# Patient Record
Sex: MALE | Race: WHITE | Employment: OTHER | URBAN - METROPOLITAN AREA
[De-identification: names, ages, dates, MRNs, and addresses within clinical notes are randomized per-mention and may not be internally consistent; named-entity substitution may affect disease eponyms.]

---

## 2017-01-01 ENCOUNTER — OFFICE VISIT (OUTPATIENT)
Dept: CARDIOLOGY | Facility: CLINIC | Age: 77
End: 2017-01-01
Attending: INTERNAL MEDICINE
Payer: MEDICARE

## 2017-01-01 ENCOUNTER — TRANSFERRED RECORDS (OUTPATIENT)
Dept: HEALTH INFORMATION MANAGEMENT | Facility: CLINIC | Age: 77
End: 2017-01-01

## 2017-01-01 VITALS
SYSTOLIC BLOOD PRESSURE: 126 MMHG | HEIGHT: 66 IN | HEART RATE: 45 BPM | DIASTOLIC BLOOD PRESSURE: 60 MMHG | WEIGHT: 142.5 LBS | OXYGEN SATURATION: 96 % | BODY MASS INDEX: 22.9 KG/M2

## 2017-01-01 DIAGNOSIS — I25.10 CORONARY ARTERY DISEASE INVOLVING NATIVE CORONARY ARTERY OF NATIVE HEART WITHOUT ANGINA PECTORIS: ICD-10-CM

## 2017-01-01 DIAGNOSIS — N18.6 ESRD (END STAGE RENAL DISEASE) ON DIALYSIS (H): Primary | ICD-10-CM

## 2017-01-01 DIAGNOSIS — I10 ESSENTIAL HYPERTENSION WITH GOAL BLOOD PRESSURE LESS THAN 140/90: ICD-10-CM

## 2017-01-01 DIAGNOSIS — E78.5 HYPERLIPIDEMIA LDL GOAL <100: ICD-10-CM

## 2017-01-01 DIAGNOSIS — Z99.2 ESRD (END STAGE RENAL DISEASE) ON DIALYSIS (H): Primary | ICD-10-CM

## 2017-01-01 DIAGNOSIS — R60.9 EDEMA, UNSPECIFIED TYPE: ICD-10-CM

## 2017-01-01 PROCEDURE — 99214 OFFICE O/P EST MOD 30 MIN: CPT | Performed by: INTERNAL MEDICINE

## 2017-01-01 RX ORDER — METOPROLOL SUCCINATE 50 MG/1
50 TABLET, EXTENDED RELEASE ORAL DAILY
Qty: 30 TABLET | Refills: 11 | Status: ON HOLD | OUTPATIENT
Start: 2017-01-01 | End: 2018-01-01

## 2017-01-01 RX ORDER — FUROSEMIDE 40 MG
40 TABLET ORAL DAILY
Qty: 90 TABLET | Refills: 3 | Status: SHIPPED | OUTPATIENT
Start: 2017-01-01 | End: 2018-01-01

## 2017-01-01 RX ORDER — SIMVASTATIN 40 MG
TABLET ORAL
Qty: 90 TABLET | Refills: 1 | Status: ON HOLD | OUTPATIENT
Start: 2017-01-01 | End: 2018-01-01

## 2017-01-09 ENCOUNTER — TRANSFERRED RECORDS (OUTPATIENT)
Dept: HEALTH INFORMATION MANAGEMENT | Facility: CLINIC | Age: 77
End: 2017-01-09

## 2017-01-09 DIAGNOSIS — I12.9 BENIGN HYPERTENSION WITH CHRONIC KIDNEY DISEASE, STAGE IV (H): Primary | ICD-10-CM

## 2017-01-09 DIAGNOSIS — N18.4 BENIGN HYPERTENSION WITH CHRONIC KIDNEY DISEASE, STAGE IV (H): ICD-10-CM

## 2017-01-09 DIAGNOSIS — M10.9 ACUTE GOUTY ARTHROPATHY: Primary | ICD-10-CM

## 2017-01-09 DIAGNOSIS — N18.4 CKD (CHRONIC KIDNEY DISEASE) STAGE 4, GFR 15-29 ML/MIN (H): Primary | ICD-10-CM

## 2017-01-09 DIAGNOSIS — N18.4 BENIGN HYPERTENSION WITH CHRONIC KIDNEY DISEASE, STAGE IV (H): Primary | ICD-10-CM

## 2017-01-09 DIAGNOSIS — I12.9 BENIGN HYPERTENSION WITH CHRONIC KIDNEY DISEASE, STAGE IV (H): ICD-10-CM

## 2017-01-09 NOTE — TELEPHONE ENCOUNTER
amLODIPine (NORVASC) 5 MG      Last Written Prescription Date: 12/13/16  Last Fill Quantity: 30, # refills: 0  Last Office Visit with [facility] or [facility] prescribing provider: 8/18/16   Next 5 appointments (look out 90 days)     Jan 19, 2017  3:15 PM   Return Visit with Tae Hendrix MD   [facility] ([facility]    [address]  [facility] [address] [phone]                   POTASSIUM   Date Value Ref Range Status   11/16/2016 4.3 3.4 - 5.3 mmol/L Final     CREATININE   Date Value Ref Range Status   11/16/2016 3.95* 0.66 - 1.25 mg/dL Final     BP Readings from Last 3 Encounters:   11/16/16 142/70   08/18/16 136/44   08/15/16 142/52

## 2017-01-09 NOTE — TELEPHONE ENCOUNTER
hydrALAZINE (APRESOLINE) 100 MG TABS      Last Written Prescription Date:  historical  Last Fill Quantity: 90,   # refills: na  Last Office Visit with [facility] or [facility] prescribing provider: 8/18/16  Future Office visit:    Next 5 appointments (look out 90 days)     Jan 19, 2017  3:15 PM   Return Visit with Tae Hendrix MD   [facility]    [address]  [facility] [id number] [phone]                   Routing refill request to provider for review/approval because:  Medication is reported/historical

## 2017-01-10 RX ORDER — ALLOPURINOL 100 MG/1
100 TABLET ORAL DAILY
Qty: 60 TABLET | Refills: 0 | Status: SHIPPED | OUTPATIENT
Start: 2017-01-10 | End: 2017-01-18

## 2017-01-10 RX ORDER — HYDRALAZINE HYDROCHLORIDE 100 MG/1
100 TABLET, FILM COATED ORAL 3 TIMES DAILY
Qty: 60 TABLET | Refills: 0 | Status: SHIPPED | OUTPATIENT
Start: 2017-01-10 | End: 2017-01-18

## 2017-01-10 RX ORDER — AMLODIPINE BESYLATE 5 MG/1
5 TABLET ORAL DAILY
Qty: 30 TABLET | Refills: 0 | Status: SHIPPED | OUTPATIENT
Start: 2017-01-10 | End: 2017-01-16

## 2017-01-10 NOTE — TELEPHONE ENCOUNTER
1st attempt called and left message for pt to call us and schedule appt. There were 2 encounters to call patient I closed the one and used just this encounter

## 2017-01-10 NOTE — TELEPHONE ENCOUNTER
Routing refill request to provider for review/approval because:  Labs out of range:  Cr, uric acid not on file. Please sign if ok.   Lab teed up if you would like.   Kassandra Machado, RN  Triage Nurse

## 2017-01-10 NOTE — TELEPHONE ENCOUNTER
Will address at up coming appt;   meds have been adjusted by cardiology but not clear if meds being filled by cardiology;

## 2017-01-10 NOTE — TELEPHONE ENCOUNTER
meds adjusted by cardiology. Due for evaluation in clinic to reassess this medication; cardiology or PCP.

## 2017-01-10 NOTE — TELEPHONE ENCOUNTER
allopurinol (ZYLOPRIM) 100 MG       Last Written Prescription Date: 7/25/16  Last Fill Quantity: 60, # refills: 3  Last Office Visit with [facility] or [facility] prescribing provider:  8/18/16   Next 5 appointments (look out 90 days)     Jan 19, 2017  3:15 PM   Return Visit with Tae Hendrix MD   [facility]    [address]  [facility] [address] [phone]                   No results found for: URIC]  CREATININE   Date Value Ref Range Status   11/16/2016 3.95* 0.66 - 1.25 mg/dL Final   ]  WBC     10.2   11/16/2016  RBC     2.04   11/16/2016  HGB      6.7   11/16/2016  HCT     20.7   11/16/2016  No components found with this name: mct  MCV      102   11/16/2016  MCH     32.8   11/16/2016  MCHC     32.4   11/16/2016  RDW     14.7   11/16/2016  PLT      218   11/16/2016  PLT      312   8/14/2014  AST       12   8/30/2016  ALT       15   8/30/2016

## 2017-01-10 NOTE — TELEPHONE ENCOUNTER
Routing refill request to provider for review/approval because:  Labs out of range:  Cr. (CKD) BP above desired range. Please sign if ok.  Kassandra Machado, ALEXANDER  Triage Nurse

## 2017-01-10 NOTE — TELEPHONE ENCOUNTER
3 SEPARATE REQUESTS put in on the same patient, on the same day.   Same message, due for appt; and labs.  Interim fill of meds and assist with scheduling

## 2017-01-16 DIAGNOSIS — N18.4 CKD (CHRONIC KIDNEY DISEASE) STAGE 4, GFR 15-29 ML/MIN (H): Primary | ICD-10-CM

## 2017-01-16 DIAGNOSIS — I12.9 BENIGN HYPERTENSION WITH CHRONIC KIDNEY DISEASE, STAGE IV (H): ICD-10-CM

## 2017-01-16 DIAGNOSIS — N18.4 BENIGN HYPERTENSION WITH CHRONIC KIDNEY DISEASE, STAGE IV (H): ICD-10-CM

## 2017-01-16 RX ORDER — AMLODIPINE BESYLATE 10 MG/1
10 TABLET ORAL DAILY
Qty: 90 TABLET | Refills: 3 | Status: SHIPPED | OUTPATIENT
Start: 2017-01-16 | End: 2018-01-01

## 2017-01-18 ENCOUNTER — OFFICE VISIT (OUTPATIENT)
Dept: FAMILY MEDICINE | Facility: CLINIC | Age: 77
End: 2017-01-18
Payer: MEDICARE

## 2017-01-18 VITALS
TEMPERATURE: 98.1 F | DIASTOLIC BLOOD PRESSURE: 58 MMHG | OXYGEN SATURATION: 100 % | BODY MASS INDEX: 21.89 KG/M2 | WEIGHT: 136.2 LBS | RESPIRATION RATE: 16 BRPM | HEIGHT: 66 IN | HEART RATE: 70 BPM | SYSTOLIC BLOOD PRESSURE: 124 MMHG

## 2017-01-18 DIAGNOSIS — M10.9 ACUTE GOUTY ARTHROPATHY: ICD-10-CM

## 2017-01-18 DIAGNOSIS — N18.4 BENIGN HYPERTENSION WITH CHRONIC KIDNEY DISEASE, STAGE IV (H): Primary | ICD-10-CM

## 2017-01-18 DIAGNOSIS — I12.9 BENIGN HYPERTENSION WITH CHRONIC KIDNEY DISEASE, STAGE IV (H): Primary | ICD-10-CM

## 2017-01-18 DIAGNOSIS — E78.5 HYPERLIPIDEMIA LDL GOAL <100: ICD-10-CM

## 2017-01-18 DIAGNOSIS — E11.59 TYPE 2 DIABETES MELLITUS WITH OTHER CIRCULATORY COMPLICATION: ICD-10-CM

## 2017-01-18 DIAGNOSIS — T82.510D: ICD-10-CM

## 2017-01-18 DIAGNOSIS — N18.4 CKD (CHRONIC KIDNEY DISEASE) STAGE 4, GFR 15-29 ML/MIN (H): ICD-10-CM

## 2017-01-18 PROCEDURE — 99214 OFFICE O/P EST MOD 30 MIN: CPT | Performed by: INTERNAL MEDICINE

## 2017-01-18 PROCEDURE — 99207 C FOOT EXAM  NO CHARGE: CPT | Performed by: INTERNAL MEDICINE

## 2017-01-18 RX ORDER — ALLOPURINOL 100 MG/1
100 TABLET ORAL DAILY
Qty: 90 TABLET | Refills: 3 | Status: SHIPPED | OUTPATIENT
Start: 2017-01-18 | End: 2018-01-01

## 2017-01-18 RX ORDER — SIMVASTATIN 40 MG
40 TABLET ORAL AT BEDTIME
Qty: 90 TABLET | Refills: 1 | Status: SHIPPED | OUTPATIENT
Start: 2017-01-18 | End: 2017-05-18

## 2017-01-18 RX ORDER — HYDRALAZINE HYDROCHLORIDE 100 MG/1
100 TABLET, FILM COATED ORAL 3 TIMES DAILY
Qty: 270 TABLET | Refills: 1 | Status: SHIPPED | OUTPATIENT
Start: 2017-01-18 | End: 2017-05-18

## 2017-01-18 NOTE — MR AVS SNAPSHOT
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        Follow-ups after your visit        Your next 10 appointments already scheduled     Jan 19, 2017  1:00 PM   US CAROTID BILATERAL with [address] ([facility])    [address] [address] [address]   [phone]           Please bring a list of your medicines (including vitamins, minerals and over-the-counter drugs). Also, tell your doctor about any allergies you may have. Wear comfortable clothes and leave your valuables at home.  You do not need to do anything special to prepare for your exam.  Please call the Imaging Department at your exam site with any questions.            Jan 19, 2017  1:45 PM   US EXTREMITY ARTERIAL VENOUS DIALYSIS ACCESS GRAFT with [address] ([facility])    [address] [facility] [address]  [facility] [address]   [phone]           Please bring a list of your medicines (including vitamins, minerals and over-the-counter drugs). Also, tell your doctor about any allergies you may have. Wear comfortable clothes and leave your valuables at home.  You do not need to do anything special to  prepare for your exam.  Please call the Imaging Department at your exam site with any questions.            Jan 19, 2017  3:15 PM   Return Visit with Tae Hendrix MD   [facility] ([facility])    [address]  [facility] [id number] [phone]              Future tests that were ordered for you today     Open Future Orders        Priority Expected Expires Ordered    Hemoglobin A1c Routine 5/18/2017 1/18/2018 1/18/2017    Comprehensive metabolic panel Routine 5/18/2017 1/18/2018 1/18/2017    Lipid panel reflex to direct LDL Routine 5/18/2017 1/18/2018 1/18/2017            Who to contact     If you have questions or need follow up information about today's clinic visit or your schedule please contact [facility] directly at [phone].  Normal or non-critical lab and imaging results will be communicated to you by MyChart, letter or phone within 4 business days after the clinic has received the results. If you do not hear from us within 7 days, please contact the clinic through [facility]Sp[facility]art or phone. If you have a critical or abnormal lab result, we will notify you by phone as soon as possible.  Submit refill requests through TriggerMail or call your pharmacy and they will forward the refill request to us. Please allow 3 business days for your refill to be completed.          Additional Information About Your Visit        MyChart Information     TriggerMail gives you secure access to your electronic health record. If you see a primary care provider, you can also send messages to your care team and make appointments. If you have questions, please call your primary care clinic.  If you do not have a primary care provider, please call [phone] and they will assist you.        Care EveryWhere ID     This is your Care EveryWhere ID. This could be used by other organizations to access your Distant medical records  [id number]        Your  "Vitals Were     Pulse Temperature Respirations Height BMI (Body Mass Index) Pulse Oximetry    70 98.1  F (36.7  C) (Oral) 16 5' 5.5\" (1.664 m) 22.31 kg/m2 100%       Blood Pressure from Last 3 Encounters:   01/18/17 124/58   11/16/16 142/70   08/18/16 136/44    Weight from Last 3 Encounters:   01/18/17 136 lb 3.2 oz (61.78 kg)   11/16/16 128 lb 1.4 oz (58.1 kg)   08/18/16 131 lb 3.2 oz (59.512 kg)              We Performed the Following     FOOT EXAM          Today's Medication Changes          These changes are accurate as of: 1/18/17 11:17 AM.  If you have any questions, ask your nurse or doctor.               These medicines have changed or have updated prescriptions.        Dose/Directions    allopurinol 100 MG tablet   Commonly known as:  ZYLOPRIM   This may have changed:  additional instructions   Used for:  Acute gouty arthropathy   Changed by:  Shaila Silverman MD        Dose:  100 mg   Take 1 tablet (100 mg) by mouth daily   Quantity:  90 tablet   Refills:  3         Stop taking these medicines if you haven't already. Please contact your care team if you have questions.     sitagliptin 25 MG tablet   Commonly known as:  JANUVIA   Stopped by:  Shaila Silverman MD                Where to get your medicines      These medications were sent to [id number] - [facility] [address] [facility] [address] [place] [address] & [facility]  [address] [facility] [address]     Phone:  [phone]    - allopurinol 100 MG tablet  - hydrALAZINE 100 MG Tabs tablet  - simvastatin 40 MG tablet             Primary Care Provider Office Phone # Fax #    Shaila Silverman [phone]       [facility] [address]  [facility] [address]        Thank you!     Thank you for choosing [facility]  for your care. Our goal is always to provide you with excellent care. Hearing back from our patients is one way we can continue to improve our services. " Please take a few minutes to complete the written survey that you may receive in the mail after your visit with us. Thank you!             Your Updated Medication List - Protect others around you: Learn how to safely use, store and throw away your medicines at www.disposemymeds.org.          This list is accurate as of: 1/18/17 11:17 AM.  Always use your most recent med list.                   Brand Name Dispense Instructions for use    allopurinol 100 MG tablet    ZYLOPRIM    90 tablet    Take 1 tablet (100 mg) by mouth daily       amLODIPine 10 MG tablet    NORVASC    90 tablet    Take 1 tablet (10 mg) by mouth daily       aspirin 81 MG EC tablet      Take 81 mg by mouth daily       docusate sodium 100 MG capsule    COLACE     Take 100 mg by mouth 2 times daily       hydrALAZINE 100 MG Tabs tablet    APRESOLINE    270 tablet    Take 1 tablet (100 mg) by mouth 3 times daily       isosorbide mononitrate 30 MG 24 hr tablet    IMDUR    90 tablet    Take 1 tablet (30 mg) by mouth daily       MEGACE ORAL PO      Take 40 mg by mouth 2 times daily       METOPROLOL SUCCINATE ER PO      Take 75 mg by mouth daily 50 mg x 1.5 tabs       omeprazole 20 MG CR capsule    priLOSEC    30 capsule    Take 1 capsule (20 mg) by mouth daily       simvastatin 40 MG tablet    ZOCOR    90 tablet    Take 1 tablet (40 mg) by mouth At Bedtime       VITAMIN D3 PO      Take 1,000 Units by mouth daily

## 2017-01-18 NOTE — PROGRESS NOTES
SUBJECTIVE:                                                    Bean Croft is a 76 year old male who presents to clinic today for the following health issues:       Hypertension Follow-up    Outpatient blood pressures are not being checked.    Low Salt Diet: no added salt    BP Readings from Last 3 Encounters:   01/18/17 124/58   11/16/16 142/70   08/18/16 136/44          Amount of exercise or physical activity: None    Problems taking medications regularly: No    Medication side effects: none    Diet: regular (no restrictions)      HISTORY OF PRESENT ILLNESS:    Diabetes:  The patient reports having started Dialysis treatment in November at [facility]. He now goes twice per week and has felt noticeably better since starting Dialysis, noting he no longer needs to check his blood sugars anymore because his diabetes is well-controlled. The only concerns he has at this time is with his appetite, which he claims has decreased. He has been forcing himself to drink boost every day to receive good nutrition and protein.     Hypertension:  The patient is happy about his blood pressure reading in the clinic today, because he states his readings have been climbing steadily. After seeing his blood pressure of 124/52 today, he believes the addition of Amlodipine has been helpful in stabilizing his blood pressure.       Problem list and histories reviewed & adjusted, as indicated.  Additional history: as documented    Labs reviewed in EPIC  Problem list, Medication list, Allergies, and Medical/Social/Surgical histories reviewed in [facility] and updated as appropriate.      REVIEW OF SYSTEMS:  C: NEGATIVE for fever, chills, or change in weight  R: NEGATIVE for significant cough or SOB  CV: Hx of Left Carotid Stenosis, Peripheral Vascular Disease, Myocardial Infarction, Coronary Artery Disease, Pseudoaneurysm, and Hypertension - use of Amlodipine, Hydralazine, and Metoprolol; NEGATIVE for chest pain, palpitations or  "peripheral edema  GI: Hx of Gastroesophageal Reflux - use of Omeprazole; NEGATIVE for nausea, abdominal pain, or change in bowel habits  : Hx of Chronic Kidney Disease - stage 4 - Dialysis Treatment twice per week since November; NEGATIVE for unusual urinary symptoms  M: NEGATIVE for significant arthralgias or myalgia  N: NEGATIVE for weakness, dizziness or paresthesias  E: Hx of Hyperlipidemia - use of Simvastatin; Hx of Diabetes Mellitus - use of Januvia and Dialysis Treatment; NEGATIVE for temperature intolerance, or skin/hair changes  H: Hx of Anemia in Chronic Kidney Disease; NEGATIVE for bleeding problems  P: NEGATIVE for changes in mood or affect    This document serves as a record of the services and decisions personally performed and made by Shaila Silverman MD. It was created on her behalf by Bety Dugan, a trained medical scribe. The creation of this document is based the provider's statements to the medical scribe.  Bety Dugan, January 18, 2017 10:55 AM     OBJECTIVE:                                                    /58 mmHg  Pulse 70  Temp(Src) 98.1  F (36.7  C) (Oral)  Resp 16  Ht 1.664 m (5' 5.5\")  Wt 61.78 kg (136 lb 3.2 oz)  BMI 22.31 kg/m2  SpO2 100%  Body mass index is 22.31 kg/(m^2).    EXAM:  GENERAL: Patient appears healthy, alert and not distressed.  EYES: Eyes appear grossly normal to inspection, with normal conjunctivae and sclerae  NECK: No adenopathy present, no asymmetry, masses, or scars noted, thyroid is normal to palpation  RESP: Lungs are clear to auscultation - no rales, rhonchi or wheezes present  CV: Regular rate and rhythm, normal S1 S2 heart sounds, no ectopy, no peripheral edema present, peripheral pulses normal, no carotid bruit  Left brachial HD fistula with palpable thrill;   Right upper chest with catheter   ABDOMEN: Soft, nontender, no hepatosplenomegaly, no masses, normal bowel sounds  MS: Uses a walker for ambulation; No peripheral edema  SKIN: No " suspicious rashes, lesions, or moles; Appearance and color has improved since last clinical visit; Ecchymosis noted along the lateral aspect of the patient's left arm - HD fistula noted with palpable thrill  NEURO: Patient exhibits normal strength and tone, normal speech and mentation  PSYCH: Mentation appears normal, affect is normal/bright    Diagnostic Test Results:  No results found for this or any previous visit (from the past 24 hour(s)).      ASSESSMENT/PLAN:                                                    (I12.9,  N18.4) Benign hypertension with chronic kidney disease, stage IV (H)  (primary encounter diagnosis)  Comment: Optimally controlled; Hydralazine, Amlodipine, and Metoprolol are effective and well-tolerated; No changes in medications or dosing; Will continue to monitor.  Plan: hydrALAZINE (APRESOLINE) 100 MG TABS tablet          (M10.9) Acute gouty arthropathy  Comment: Right great toe periodically flares; Daily Allopurinol has been helpful.  Plan: allopurinol (ZYLOPRIM) 100 MG tablet          (E78.5) Hyperlipidemia LDL goal <100  Comment: Historically at goal; Future labs pending - instructed to follow-up in 4 months to recheck Cholesterol levels; Continue taking Simvastatin 40 MG as prescribed; Will reassess in 4 months.  Plan: simvastatin (ZOCOR) 40 MG tablet, Comprehensive        metabolic panel, Lipid panel reflex to direct LDL          (N18.4) CKD (chronic kidney disease) stage 4, GFR 15-29 ml/min (H)  Comment: Hemodialysis twice per week since November; Results appear to be effective; Patient appears healthier and affect has brightened; Future CMP ordered to recheck GFR and kidney function; Will continue to monitor.  Plan: Comprehensive metabolic panel          (T82.898D) Pseudoaneurysm of AV hemodialysis fistula, subsequent encounter  Comment: Emergency procedure performed in late November for repair; Condition has improved.  Plan: defer the use of the HD fistula to vascular  surgery.    (E11.59) Type 2 diabetes mellitus with other circulatory complication (H)  Comment: Stop Januvia - Hemoglobin A1C is 5.6; Will continue to monitor every 3-6 months. He does not check blood sugars.  Plan: FOOT EXAM, Hemoglobin A1c, Comprehensive         metabolic panel, Lipid panel reflex to direct LDL            MEDICATIONS:   No orders of the defined types were placed in this encounter.     Medications Discontinued During This Encounter   Medication Reason     polyethylene glycol (MIRALAX/GLYCOLAX) powder Medication Reconciliation Clean Up     sitagliptin (JANUVIA) 25 MG tablet Alternate therapy          - Continue other medications without change    FOLLOW-UP: Please schedule a follow-up visit in 4 months to recheck Hemoglobin A1C.      The information in this document, created by a medical scribe for me, accurately reflects the services I personally performed and the decisions made by me. I have reviewed and approved this document for accuracy.  Dr. Shaila Silverman, 11:15 AM, January 18, 2017    Shaila Silverman MD  Internal Medicine   [facility]

## 2017-01-18 NOTE — NURSING NOTE
"Chief Complaint   Patient presents with     Hypertension     Recheck Medication       Initial /58 mmHg  Pulse 70  Temp(Src) 98.1  F (36.7  C) (Oral)  Resp 16  Ht 5' 5.5\" (1.664 m)  Wt 136 lb 3.2 oz (61.78 kg)  BMI 22.31 kg/m2  SpO2 100% Estimated body mass index is 22.31 kg/(m^2) as calculated from the following:    Height as of this encounter: 5' 5.5\" (1.664 m).    Weight as of this encounter: 136 lb 3.2 oz (61.78 kg).  BP completed using cuff size: regular    "

## 2017-01-19 ENCOUNTER — OFFICE VISIT (OUTPATIENT)
Dept: SURGERY | Facility: CLINIC | Age: 77
End: 2017-01-19
Attending: SURGERY
Payer: MEDICARE

## 2017-01-19 ENCOUNTER — HOSPITAL ENCOUNTER (OUTPATIENT)
Dept: ULTRASOUND IMAGING | Facility: CLINIC | Age: 77
Discharge: HOME | End: 2017-01-19
Attending: SURGERY | Admitting: SURGERY
Payer: MEDICARE

## 2017-01-19 ENCOUNTER — HOSPITAL ENCOUNTER (OUTPATIENT)
Dept: ULTRASOUND IMAGING | Facility: CLINIC | Age: 77
End: 2017-01-19
Attending: SURGERY
Payer: MEDICARE

## 2017-01-19 DIAGNOSIS — I77.0 AVF (ARTERIOVENOUS FISTULA) (H): ICD-10-CM

## 2017-01-19 DIAGNOSIS — N18.6 ESRD (END STAGE RENAL DISEASE) ON DIALYSIS (H): ICD-10-CM

## 2017-01-19 DIAGNOSIS — Z09 FOLLOW-UP EXAMINATION FOLLOWING SURGERY: Primary | ICD-10-CM

## 2017-01-19 DIAGNOSIS — I65.22 LEFT CAROTID STENOSIS: ICD-10-CM

## 2017-01-19 DIAGNOSIS — Z99.2 ESRD (END STAGE RENAL DISEASE) ON DIALYSIS (H): ICD-10-CM

## 2017-01-19 PROCEDURE — 93990 DOPPLER FLOW TESTING: CPT

## 2017-01-19 PROCEDURE — 99024 POSTOP FOLLOW-UP VISIT: CPT | Performed by: SURGERY

## 2017-01-19 PROCEDURE — 93880 EXTRACRANIAL BILAT STUDY: CPT

## 2017-01-19 NOTE — Clinical Note
[facility]  [address]  [place] [address]  Phone: [phone]  Fax: [phone]      2017            Shaila Silverman MD   [facility]    [address]   [place], [place]  [address]      Re: Bean ELLIS Guerda,  1940      Dear Dr. Silverman:      I again saw Guru Croft in my [facility] vascular office.  He is a 76-year-old gentleman who presented to me at [facility] in 2016 with 2 rapidly expanding pseudoaneurysms of his left brachial basilic transposition AV fistula.  He was taken emergently to the operating room and underwent repair of the injuries x2 to his fistula.  This was his second postoperative visit.  He continues to dialyze via a right internal jugular tunneled catheter.  He also was status post a left carotid endarterectomy performed by my former partner, Dr. Bastheva Wright, in the spring of 2016.  In addition to getting a fistula ultrasound today he also received bilateral carotid ultrasonography.        On exam, he is a pleasant individual in no acute distress.  His left upper arm AV fistula has an excellent thrill with normal intact overlying skin.  He has 2+ palpable radial pulses bilaterally.  His neurologic exam is nonfocal throughout.      Review of today's left arm AV fistula ultrasound shows a widely patent fistula with a calculated outflow volume of 1633 mL per minute, which is excellent.  Bilateral carotid ultrasonography today shows minimal right-sided disease in the range of 16-49%.  On the left side it is widely patent.      IMPRESSION:  Two months status post repair of 2 expanding pseudoaneurysms in the left brachial basilic transposition AV fistula, doing well from that standpoint.  Less than 1 year status post left carotid endarterectomy with today's ultrasound showing minimal right-sided disease and a widely patent left internal carotid.      RECOMMENDATION:  From my standpoint, he will continue  his medical regimen.  I am clearing him to allow the dialysis center to attempt access once again of the left upper arm fistula.  Assuming that is successful, he can then have the tunneled catheter removed.  From the standpoint of the carotid disease, I have no ongoing concerns.  Followup with me for the fistula will be on an as-needed basis.  I will arrange for repeat bilateral carotid ultrasonography with clinical followup in 2 years' time.              I appreciate the opportunity to participate in his care.  Please feel free to contact me should you have questions or concerns.      Sincerely,             Tae Hendrix,MD BRANDI Lopez/kathryn      Dictation #[id number]  D: 01/19/2017  T: 01/23/2017

## 2017-01-23 DIAGNOSIS — I65.29 CAROTID ARTERY STENOSIS: Primary | ICD-10-CM

## 2017-01-24 NOTE — PROGRESS NOTES
2017            Shaila Silverman MD   [facility]    [address]   [place], [place]  [address]      Re: Bean AHMADIJane Croft,  1940      Dear Dr. Silverman:      I again saw Guru Croft in my [facility] vascular office.  He is a 76-year-old gentleman who presented to me at [facility] in 2016 with 2 rapidly expanding pseudoaneurysms of his left brachial basilic transposition AV fistula.  He was taken emergently to the operating room and underwent repair of the injuries x2 to his fistula.  This was his second postoperative visit.  He continues to dialyze via a right internal jugular tunneled catheter.  He also was status post a left carotid endarterectomy performed by my former partner, Dr. Batsheva Wright, in the spring of 2016.  In addition to getting a fistula ultrasound today he also received bilateral carotid ultrasonography.        On exam, he is a pleasant individual in no acute distress.  His left upper arm AV fistula has an excellent thrill with normal intact overlying skin.  He has 2+ palpable radial pulses bilaterally.  His neurologic exam is nonfocal throughout.      Review of today's left arm AV fistula ultrasound shows a widely patent fistula with a calculated outflow volume of 1633 mL per minute, which is excellent.  Bilateral carotid ultrasonography today shows minimal right-sided disease in the range of 16-49%.  On the left side it is widely patent.      IMPRESSION:  Two months status post repair of 2 expanding pseudoaneurysms in the left brachial basilic transposition AV fistula, doing well from that standpoint.  Less than 1 year status post left carotid endarterectomy with today's ultrasound showing minimal right-sided disease and a widely patent left internal carotid.      RECOMMENDATION:  From my standpoint, he will continue his medical regimen.  I am clearing him to allow the dialysis center to attempt access once again of the left upper arm fistula.   Assuming that is successful, he can then have the tunneled catheter removed.  From the standpoint of the carotid disease, I have no ongoing concerns.  Followup with me for the fistula will be on an as-needed basis.  I will arrange for repeat bilateral carotid ultrasonography with clinical followup in 2 years' time.         Re:  Edmond Klein, page 2      I appreciate the opportunity to participate in his care.  Please feel free to contact me should you have questions or concerns.      Sincerely,             Tae Hendrix,MD BRANDI Lopez/ktahryn      Dictation #[id number]  D: 2017  T: 2017         TAE HENDRIX MD             D: 2017 14:35   T: 2017 20:13   MT: kathryn      Name:     EDMOND KLEIN   MRN:      [id number]        Account:      [id number]   :      1940           Service Date: 2017      Document: [id number]      hospitals      ROS      Physical Exam

## 2017-02-16 ENCOUNTER — APPOINTMENT (OUTPATIENT)
Dept: INTERVENTIONAL RADIOLOGY/VASCULAR | Facility: CLINIC | Age: 77
End: 2017-02-16
Attending: INTERNAL MEDICINE
Payer: MEDICARE

## 2017-02-16 ENCOUNTER — HOSPITAL ENCOUNTER (OUTPATIENT)
Facility: CLINIC | Age: 77
Discharge: HOME | End: 2017-02-16
Attending: RADIOLOGY | Admitting: RADIOLOGY
Payer: MEDICARE

## 2017-02-16 VITALS
SYSTOLIC BLOOD PRESSURE: 152 MMHG | TEMPERATURE: 98.2 F | OXYGEN SATURATION: 94 % | DIASTOLIC BLOOD PRESSURE: 71 MMHG | RESPIRATION RATE: 20 BRPM

## 2017-02-16 DIAGNOSIS — N18.6 ESRD (END STAGE RENAL DISEASE) (H): ICD-10-CM

## 2017-02-16 PROCEDURE — 36589 REMOVAL TUNNELED CV CATH: CPT

## 2017-02-16 NOTE — DISCHARGE INSTRUCTIONS
Going Home after the   Removal of your Tunneled Dialysis Catheter  ______________________________________________________________________    Patient Name:  Bean Croft  Today's Date:  February 16, 2017    The doctor who removed your port or catheter was: Dr Cross at [facility]  in:    Interventional Radiology Department  When you get home:    No driving or drinking alcohol until tomorrow. You may still have side effects from   the medicine you received today (you may feel drowsy, unsteady or forgetful).    You should have an adult with you for the first 6 hours at home (radiology patients).    You may go back to your regular diet today.     If you take aspirin or Plavix, you may begin taking it again tomorrow. You may restart all other medicines today. Use pain medicine as directed.    Avoid heavy lifting or the overuse of your shoulder for three days.    Port site and bandage care    Keep the wound clean and dry for three days. Cover it with plastic before taking a shower.     Change the bandage if it gets wet or dirty. Use bacitracin (antibiotic cream) and clean gauze.     After three days, you may use Band-Aids until the wound has healed.    If you have oozing or bleeding from the port site or catheter (tube) site:   o Put direct pressure on the wound for 5 to 10 minutes with a gauze pad.  If you still have bleeding after 10 minutes, call your doctor.  o If you are bleeding a lot and can t control it with direct pressure, call 911.    Call your doctor at Primary clinic if you have:    Swelling in your neck.    Signs of infection:   o fever over 100  F (37.8 C) under the tongue  o the wound is red, tender or draining.

## 2017-02-16 NOTE — IP AVS SNAPSHOT
[facility]    [address]    [facility] [address]    Phone:  [phone]                                       After Visit Summary   2/16/2017    Bean Croft    MRN: [id number]           After Visit Summary Signature Page     I have received my discharge instructions, and my questions have been answered. I have discussed any challenges I see with this plan with the nurse or doctor.    ..........................................................................................................................................  Patient/Patient Representative Signature      ..........................................................................................................................................  Patient Representative Print Name and Relationship to Patient    ..................................................               ................................................  Date                                            Time    ..........................................................................................................................................  Reviewed by Signature/Title    ...................................................              ..............................................  Date                                                            Time

## 2017-02-16 NOTE — IP AVS SNAPSHOT
MRN:[id number]                      After Visit Summary   2/16/2017    Bean Croft    MRN: [id number]           Visit Information        Department      2/16/2017  9:54 AM [facility]          Review of your medicines      UNREVIEWED medicines. Ask your doctor about these medicines        Dose / Directions    allopurinol 100 MG tablet   Commonly known as:  ZYLOPRIM   Used for:  Acute gouty arthropathy        Dose:  100 mg   Take 1 tablet (100 mg) by mouth daily   Quantity:  90 tablet   Refills:  3       amLODIPine 10 MG tablet   Commonly known as:  NORVASC   Used for:  CKD (chronic kidney disease) stage 4, GFR 15-29 ml/min (H), Benign hypertension with chronic kidney disease, stage IV (H)        Dose:  10 mg   Take 1 tablet (10 mg) by mouth daily   Quantity:  90 tablet   Refills:  3       aspirin 81 MG EC tablet        Dose:  81 mg   Take 81 mg by mouth daily   Refills:  0       docusate sodium 100 MG capsule   Commonly known as:  COLACE        Dose:  100 mg   Take 100 mg by mouth 2 times daily   Refills:  0       hydrALAZINE 100 MG Tabs tablet   Commonly known as:  APRESOLINE   Used for:  Benign hypertension with chronic kidney disease, stage IV (H)        Dose:  100 mg   Take 1 tablet (100 mg) by mouth 3 times daily   Quantity:  270 tablet   Refills:  1       isosorbide mononitrate 30 MG 24 hr tablet   Commonly known as:  IMDUR   Used for:  Essential hypertension        Dose:  30 mg   Take 1 tablet (30 mg) by mouth daily   Quantity:  90 tablet   Refills:  3       MEGACE ORAL PO        Dose:  40 mg   Take 40 mg by mouth 2 times daily   Refills:  0       METOPROLOL SUCCINATE ER PO        Dose:  75 mg   Take 75 mg by mouth daily 50 mg x 1.5 tabs   Refills:  0       omeprazole 20 MG CR capsule   Commonly known as:  priLOSEC   Used for:  Heartburn        Dose:  20 mg   Take 1 capsule (20 mg) by mouth daily   Quantity:  30 capsule   Refills:  7       simvastatin 40 MG tablet    Commonly known as:  ZOCOR   Used for:  Hyperlipidemia LDL goal <100        Dose:  40 mg   Take 1 tablet (40 mg) by mouth At Bedtime   Quantity:  90 tablet   Refills:  1       VITAMIN D3 PO        Dose:  1000 Units   Take 1,000 Units by mouth daily   Refills:  0                Protect others around you: Learn how to safely use, store and throw away your medicines at www.disposemymeds.org.         Follow-ups after your visit         Care Instructions        Further instructions from your care team         Going Home after the   Removal of your Tunneled Dialysis Catheter  ______________________________________________________________________    Patient Name:  Bean Croft  Today's Date:  February 16, 2017    The doctor who removed your port or catheter was: Dr Cross at [facility]  in:    Interventional Radiology Department  When you get home:    No driving or drinking alcohol until tomorrow. You may still have side effects from   the medicine you received today (you may feel drowsy, unsteady or forgetful).    You should have an adult with you for the first 6 hours at home (radiology patients).    You may go back to your regular diet today.     If you take aspirin or Plavix, you may begin taking it again tomorrow. You may restart all other medicines today. Use pain medicine as directed.    Avoid heavy lifting or the overuse of your shoulder for three days.    Port site and bandage care    Keep the wound clean and dry for three days. Cover it with plastic before taking a shower.     Change the bandage if it gets wet or dirty. Use bacitracin (antibiotic cream) and clean gauze.     After three days, you may use Band-Aids until the wound has healed.    If you have oozing or bleeding from the port site or catheter (tube) site:   o Put direct pressure on the wound for 5 to 10 minutes with a gauze pad.  If you still have bleeding after 10 minutes, call your doctor.  o If you are bleeding a lot and can t  control it with direct pressure, call 911.    Call your doctor at Primary clinic if you have:    Swelling in your neck.    Signs of infection:   o fever over 100  F (37.8 C) under the tongue  o the wound is red, tender or draining.     Additional Information About Your Visit        MyChart Information     [employer] gives you secure access to your electronic health record. If you see a primary care provider, you can also send messages to your care team and make appointments. If you have questions, please call your primary care clinic.  If you do not have a primary care provider, please call [phone] and they will assist you.        Care EveryWhere ID     This is your Care EveryWhere ID. This could be used by other organizations to access your [place] medical records  [id number]         Primary Care Provider Office Phone # Fax #    Shaila Silverman [phone]      Thank you!     Thank you for choosing [facility] for your care. Our goal is always to provide you with excellent care. Hearing back from our patients is one way we can continue to improve our services. Please take a few minutes to complete the written survey that you may receive in the mail after you visit. If you would like to speak to someone directly about your visit please contact Patient Relations at [phone]. Thank you!               Medication List: This is a list of all your medications and when to take them. Check marks below indicate your daily home schedule. Keep this list as a reference.      Medications           Morning Afternoon Evening Bedtime As Needed    allopurinol 100 MG tablet   Commonly known as:  ZYLOPRIM   Take 1 tablet (100 mg) by mouth daily                                amLODIPine 10 MG tablet   Commonly known as:  NORVASC   Take 1 tablet (10 mg) by mouth daily                                aspirin 81 MG EC tablet   Take 81 mg by mouth daily                                docusate  sodium 100 MG capsule   Commonly known as:  COLACE   Take 100 mg by mouth 2 times daily                                hydrALAZINE 100 MG Tabs tablet   Commonly known as:  APRESOLINE   Take 1 tablet (100 mg) by mouth 3 times daily                                isosorbide mononitrate 30 MG 24 hr tablet   Commonly known as:  IMDUR   Take 1 tablet (30 mg) by mouth daily                                MEGACE ORAL PO   Take 40 mg by mouth 2 times daily                                METOPROLOL SUCCINATE ER PO   Take 75 mg by mouth daily 50 mg x 1.5 tabs                                omeprazole 20 MG CR capsule   Commonly known as:  priLOSEC   Take 1 capsule (20 mg) by mouth daily                                simvastatin 40 MG tablet   Commonly known as:  ZOCOR   Take 1 tablet (40 mg) by mouth At Bedtime                                VITAMIN D3 PO   Take 1,000 Units by mouth daily

## 2017-02-16 NOTE — PROGRESS NOTES
TUNNEL CATHETER REMOVED BY PHYSICIAN WITHOUT INCIDENT. DRESSING CLEAN DRY AND INTACT. DISCHARGE INSTRUCTION PROVIDED TO SPOUSE WITH QUESTIONS ANSWERED.

## 2017-02-18 ENCOUNTER — TRANSFERRED RECORDS (OUTPATIENT)
Dept: HEALTH INFORMATION MANAGEMENT | Facility: CLINIC | Age: 77
End: 2017-02-18

## 2017-03-29 DIAGNOSIS — I73.9 PERIPHERAL VASCULAR DISEASE (H): ICD-10-CM

## 2017-03-29 NOTE — TELEPHONE ENCOUNTER
METOPROLOL 100MG      Last Written Prescription Date: 6/9/2016  Last Fill Quantity: 90, # refills: 1    Last Office Visit with G, [facility] or [facility] prescribing provider:  1/18/2017   Future Office Visit:        BP Readings from Last 3 Encounters:   02/16/17 152/71   01/18/17 124/58   11/16/16 142/70

## 2017-03-30 RX ORDER — METOPROLOL SUCCINATE 100 MG/1
TABLET, EXTENDED RELEASE ORAL
Qty: 90 TABLET | Refills: 0 | Status: SHIPPED | OUTPATIENT
Start: 2017-03-30 | End: 2017-05-18

## 2017-03-30 NOTE — TELEPHONE ENCOUNTER
Routing refill request to provider for review/approval because:  This is a reported medication, and per notes, is 75 mg dose. Please advise.  Kassandra Machado, RN  Triage Nurse

## 2017-03-30 NOTE — TELEPHONE ENCOUNTER
Will fill for #90 days ant then reassess.  Pt on dialysis 3 times per week. Last reading not to goal; follow up in June/July to reassess BLOOD PRESSURE

## 2017-05-02 DIAGNOSIS — I10 ESSENTIAL HYPERTENSION: ICD-10-CM

## 2017-05-02 RX ORDER — ISOSORBIDE MONONITRATE 30 MG/1
TABLET, EXTENDED RELEASE ORAL
Qty: 30 TABLET | Refills: 0 | Status: SHIPPED | OUTPATIENT
Start: 2017-05-02 | End: 2017-05-18

## 2017-05-02 NOTE — TELEPHONE ENCOUNTER
Med not prescribed by DN before. Dr. House would not refill due to patient not seen in office and advised for pcp to refill.    Patient has appt with DN 5/18.     Out of med, will you refill for 1 month?    Shania Golden RN

## 2017-05-02 NOTE — TELEPHONE ENCOUNTER
Patient is completely out of medication, please send to pharmacy ASAP.    Isosorbide mononitrate (IMDUR) 30 MG 24 hr tablet        Last Written Prescription Date: 04/20/2016  Last Fill Quantity: 90,  # refills: 3   Last Office Visit with [facility], [facility] or [facility] prescribing provider: 01/18/2017                                         Next 5 appointments (look out 90 days)     May 18, 2017 10:30 AM CDT   Office Visit with Shaila Silverman MD   [facility]    [address] [facility] [address] [phone]

## 2017-05-16 DIAGNOSIS — E78.5 HYPERLIPIDEMIA LDL GOAL <100: ICD-10-CM

## 2017-05-16 NOTE — TELEPHONE ENCOUNTER
Discontinued    SIMVASTATIN 40MG TABLETS  In chart as: simvastatin (ZOCOR) 40 MG tablet  The source prescription has been discontinued.

## 2017-05-17 RX ORDER — SIMVASTATIN 40 MG
TABLET ORAL
Qty: 30 TABLET | Refills: 0 | Status: SHIPPED | OUTPATIENT
Start: 2017-05-17 | End: 2017-05-18

## 2017-05-17 NOTE — TELEPHONE ENCOUNTER
Zocor     Last Written Prescription Date: 1/18/17  Last Fill Quantity: 90, # refills: 1  Last Office Visit with [facility], P or [facility] prescribing provider: 1/18/17  Next 5 appointments (look out 90 days)     May 18, 2017 10:30 AM CDT   [facility]    [address] [place] [address] [phone]                   Lab Results   Component Value Date    CHOL 104 04/19/2016     Lab Results   Component Value Date    HDL 40 04/19/2016     Lab Results   Component Value Date    LDL 44 04/19/2016     Lab Results   Component Value Date    TRIG 98 04/19/2016     Lab Results   Component Value Date    CHOLHDLRATIO 2.4 12/27/2014       Labs showing if normal/abnormal  Lab Results   Component Value Date    CHOL 104 04/19/2016    TRIG 98 04/19/2016    HDL 40 04/19/2016    LDL 44 04/19/2016    VLDL 21 12/27/2014    CHOLHDLRATIO 2.4 12/27/2014     Medication is being filled for 1 time refill only due to:  Patient needs labs labs ordered, appt with PCP 5.18.

## 2017-05-18 ENCOUNTER — OFFICE VISIT (OUTPATIENT)
Dept: FAMILY MEDICINE | Facility: CLINIC | Age: 77
End: 2017-05-18
Payer: MEDICARE

## 2017-05-18 VITALS
WEIGHT: 149.5 LBS | HEART RATE: 57 BPM | SYSTOLIC BLOOD PRESSURE: 124 MMHG | DIASTOLIC BLOOD PRESSURE: 50 MMHG | RESPIRATION RATE: 16 BRPM | BODY MASS INDEX: 24.5 KG/M2 | TEMPERATURE: 97.8 F | OXYGEN SATURATION: 95 %

## 2017-05-18 DIAGNOSIS — N18.5 CKD (CHRONIC KIDNEY DISEASE) STAGE 5, GFR LESS THAN 15 ML/MIN (H): ICD-10-CM

## 2017-05-18 DIAGNOSIS — I12.9 BENIGN HYPERTENSION WITH CHRONIC KIDNEY DISEASE, STAGE IV (H): ICD-10-CM

## 2017-05-18 DIAGNOSIS — E11.59 TYPE 2 DIABETES MELLITUS WITH OTHER CIRCULATORY COMPLICATION, WITHOUT LONG-TERM CURRENT USE OF INSULIN (H): ICD-10-CM

## 2017-05-18 DIAGNOSIS — I10 ESSENTIAL HYPERTENSION: ICD-10-CM

## 2017-05-18 DIAGNOSIS — E78.5 HYPERLIPIDEMIA LDL GOAL <100: Primary | ICD-10-CM

## 2017-05-18 DIAGNOSIS — N18.4 BENIGN HYPERTENSION WITH CHRONIC KIDNEY DISEASE, STAGE IV (H): ICD-10-CM

## 2017-05-18 DIAGNOSIS — G25.81 RESTLESS LEG SYNDROME: ICD-10-CM

## 2017-05-18 LAB
HBA1C MFR BLD: 6.1 % (ref 4.3–6)
HGB BLD-MCNC: 10.6 G/DL (ref 13.3–17.7)

## 2017-05-18 PROCEDURE — 85018 HEMOGLOBIN: CPT | Performed by: INTERNAL MEDICINE

## 2017-05-18 PROCEDURE — 99215 OFFICE O/P EST HI 40 MIN: CPT | Performed by: INTERNAL MEDICINE

## 2017-05-18 PROCEDURE — 80061 LIPID PANEL: CPT | Performed by: INTERNAL MEDICINE

## 2017-05-18 PROCEDURE — 80053 COMPREHEN METABOLIC PANEL: CPT | Performed by: INTERNAL MEDICINE

## 2017-05-18 PROCEDURE — 82728 ASSAY OF FERRITIN: CPT | Performed by: INTERNAL MEDICINE

## 2017-05-18 PROCEDURE — 83036 HEMOGLOBIN GLYCOSYLATED A1C: CPT | Performed by: INTERNAL MEDICINE

## 2017-05-18 PROCEDURE — 36415 COLL VENOUS BLD VENIPUNCTURE: CPT | Performed by: INTERNAL MEDICINE

## 2017-05-18 RX ORDER — SIMVASTATIN 40 MG
TABLET ORAL
Qty: 90 TABLET | Refills: 1 | Status: SHIPPED | OUTPATIENT
Start: 2017-05-18 | End: 2017-01-01

## 2017-05-18 RX ORDER — HYDRALAZINE HYDROCHLORIDE 100 MG/1
100 TABLET, FILM COATED ORAL 3 TIMES DAILY
Qty: 270 TABLET | Refills: 1 | Status: SHIPPED | OUTPATIENT
Start: 2017-05-18 | End: 2018-01-01

## 2017-05-18 RX ORDER — ACETAMINOPHEN 160 MG
1000 TABLET,DISINTEGRATING ORAL DAILY
Qty: 30 CAPSULE | Refills: 0 | COMMUNITY
Start: 2017-05-18 | End: 2018-01-01

## 2017-05-18 RX ORDER — ISOSORBIDE MONONITRATE 30 MG/1
30 TABLET, EXTENDED RELEASE ORAL DAILY
Qty: 90 TABLET | Refills: 1 | Status: SHIPPED | OUTPATIENT
Start: 2017-05-18 | End: 2017-01-01

## 2017-05-18 NOTE — NURSING NOTE
"Chief Complaint   Patient presents with     Hypertension     Lipids     Diabetes       Initial /50 (BP Location: Right arm, Patient Position: Chair, Cuff Size: Adult Regular)  Pulse 57  Temp 97.8  F (36.6  C) (Oral)  Resp 16  Wt 149 lb 8 oz (67.8 kg)  SpO2 95%  BMI 24.5 kg/m2 Estimated body mass index is 24.5 kg/(m^2) as calculated from the following:    Height as of 1/18/17: 5' 5.5\" (1.664 m).    Weight as of this encounter: 149 lb 8 oz (67.8 kg).  Medication Reconciliation: complete    "

## 2017-05-18 NOTE — MR AVS SNAPSHOT
After Visit Summary   5/18/2017    Bean Croft    MRN: [id number]           Patient Information     Date Of Birth          1940        Visit Information        Provider Department      5/18/2017 10:30 AM Shaila Silverman MD [facility]        Today's Diagnoses     CKD (chronic kidney disease) stage 4, GFR 15-29 ml/min (H)    -  1    Hyperlipidemia LDL goal <100        Essential hypertension        Benign hypertension with chronic kidney disease, stage IV (H)        Restless leg syndrome        Type 2 diabetes mellitus with other circulatory complication, without long-term current use of insulin (H)        CKD (chronic kidney disease) stage 4, GFR 15-29 ml/min (H)           Follow-ups after your visit        Who to contact     If you have questions or need follow up information about today's clinic visit or your schedule please contact [facility] directly at [phone].  Normal or non-critical lab and imaging results will be communicated to you by MyChart, letter or phone within 4 business days after the clinic has received the results. If you do not hear from us within 7 days, please contact the clinic through [employer] or phone. If you have a critical or abnormal lab result, we will notify you by phone as soon as possible.  Submit refill requests through [employer] or call your pharmacy and they will forward the refill request to us. Please allow 3 business days for your refill to be completed.          Additional Information About Your Visit        MyChart Information     [employer] gives you secure access to your electronic health record. If you see a primary care provider, you can also send messages to your care team and make appointments. If you have questions, please call your primary care clinic.  If you do not have a primary care provider, please call [phone] and they will assist you.        Care EveryWhere ID     This is your Care EveryWhere ID.  This could be used by other organizations to access your [place] medical records  [id number]        Your Vitals Were     Pulse Temperature Respirations Pulse Oximetry BMI (Body Mass Index)       57 97.8  F (36.6  C) (Oral) 16 95% 24.5 kg/m2        Blood Pressure from Last 3 Encounters:   05/18/17 124/50   02/16/17 152/71   01/18/17 124/58    Weight from Last 3 Encounters:   05/18/17 149 lb 8 oz (67.8 kg)   01/18/17 136 lb 3.2 oz (61.8 kg)   11/16/16 128 lb 1.4 oz (58.1 kg)              We Performed the Following     Comprehensive metabolic panel     Ferritin     Hemoglobin A1c     Hemoglobin     Lipid panel reflex to direct LDL          Today's Medication Changes          These changes are accurate as of: 5/18/17 11:06 AM.  If you have any questions, ask your nurse or doctor.               These medicines have changed or have updated prescriptions.        Dose/Directions    isosorbide mononitrate 30 MG 24 hr tablet   Commonly known as:  IMDUR   This may have changed:  See the new instructions.   Used for:  Essential hypertension   Changed by:  Shaila Silverman MD        Dose:  30 mg   Take 1 tablet (30 mg) by mouth daily   Quantity:  90 tablet   Refills:  1       simvastatin 40 MG tablet   Commonly known as:  ZOCOR   This may have changed:  See the new instructions.   Used for:  Hyperlipidemia LDL goal <100   Changed by:  Shaila Silverman MD        TAKE 1 TABLET(40 MG) BY MOUTH AT BEDTIME   Quantity:  90 tablet   Refills:  1       vitamin D3 2000 UNITS Caps   This may have changed:  medication strength   Changed by:  Shaila Silverman MD        Dose:  1000 Units   Take 1,000 Units by mouth daily   Quantity:  30 capsule   Refills:  0            Where to get your medicines      These medications were sent to [facility] [address]  [place] - [address] AT [facility] [address] [address] & [facility]  [address] [place] [place] [address]     Phone:  [phone]     hydrALAZINE 100 MG  Tabs tablet    isosorbide mononitrate 30 MG 24 hr tablet    simvastatin 40 MG tablet                Primary Care Provider Office Phone # Fax #    Shaila Silverman [phone]       [facility] [address]  [facility] [address]        Thank you!     Thank you for choosing [facility]  for your care. Our goal is always to provide you with excellent care. Hearing back from our patients is one way we can continue to improve our services. Please take a few minutes to complete the written survey that you may receive in the mail after your visit with us. Thank you!             Your Updated Medication List - Protect others around you: Learn how to safely use, store and throw away your medicines at www.disposemymeds.org.          This list is accurate as of: 5/18/17 11:06 AM.  Always use your most recent med list.                   Brand Name Dispense Instructions for use    allopurinol 100 MG tablet    ZYLOPRIM    90 tablet    Take 1 tablet (100 mg) by mouth daily       amLODIPine 10 MG tablet    NORVASC    90 tablet    Take 1 tablet (10 mg) by mouth daily       aspirin 81 MG EC tablet      Take 81 mg by mouth daily       docusate sodium 100 MG capsule    COLACE     Take 100 mg by mouth 2 times daily       hydrALAZINE 100 MG Tabs tablet    APRESOLINE    270 tablet    Take 1 tablet (100 mg) by mouth 3 times daily       isosorbide mononitrate 30 MG 24 hr tablet    IMDUR    90 tablet    Take 1 tablet (30 mg) by mouth daily       METOPROLOL SUCCINATE ER PO      Take 75 mg by mouth daily 50 mg x 1.5 tabs       omeprazole 20 MG CR capsule    priLOSEC    30 capsule    Take 1 capsule (20 mg) by mouth daily       simvastatin 40 MG tablet    ZOCOR    90 tablet    TAKE 1 TABLET(40 MG) BY MOUTH AT BEDTIME       vitamin D3 2000 UNITS Caps     30 capsule    Take 1,000 Units by mouth daily

## 2017-05-18 NOTE — PROGRESS NOTES
"  SUBJECTIVE:                                                    Bean Croft is a 76 year old male who presents to clinic today for the following health issues:    Diabetes Follow-up      Patient is checking blood sugars: not at all    Diabetic concerns: None     Symptoms of hypoglycemia (low blood sugar): none     Paresthesias (numbness or burning in feet) or sores: No     Date of last diabetic eye exam: 5/11/17    BP Readings from Last 1 Encounters:   05/18/17 124/50   BP     124/50  5/18/2017    Lab Results   Component Value Date     11/16/2016     11/15/2016     Lab Results   Component Value Date    A1C 5.6 11/16/2016    A1C 5.2 07/25/2016     Lab Results   Component Value Date    LDL 44 04/19/2016    LDL 32 02/01/2016   No results found for: MICROL  No results found for: MICROALBUMIN     Hyperlipidemia Follow-Up      Rate your low fat/cholesterol diet?: fair    Taking statin? Yes, no muscle aches from statin    Other lipid medications/supplements?: Fish oil/Omega 3, dose  without side effects  Recent Labs   Lab Test  04/19/16   1455  02/01/16   1227   12/27/14   0652 05/02/14   CHOL  104  87   < >  93  139   HDL  40  33*   < >  38*  36.0   LDL  44  32   < >  34  69.2   TRIG  98  111   < >  105  169   CHOLHDLRATIO   --    --    --   2.4  3.9    < > = values in this interval not displayed.        Hypertension Follow-up      Outpatient blood pressures are being checked at dialysis.  Results are \"good\" per pt.    Low Salt Diet: no added salt  BP Readings from Last 3 Encounters:   05/18/17 124/50   02/16/17 152/71   01/18/17 124/58          Amount of exercise or physical activity: None    Problems taking medications regularly: No    Medication side effects: none    Diet: low salt    Problem list and histories reviewed & adjusted, as indicated.  Additional history: as documented    Recent Labs   Lab Test  05/18/17   1112  11/16/16   0725  11/15/16   2120   08/30/16   1134   07/25/16   0754  " 07/23/16   2238  05/05/16 04/19/16   1455   02/02/16   1225  02/01/16   1227  10/29/15   A1C  6.1*  5.6   --    --    --    --   5.2   --    < >   --    --   7.0*   --    --   6.4*   < >  6.3*   LDL   --    --    --    --    --    --    --    --    --    --    --   44   --    --   32   --   51.4   HDL   --    --    --    --    --    --    --    --    --    --    --   40   --    --   33*   --   37   TRIG   --    --    --    --    --    --    --    --    --    --    --   98   --    --   111   --   148   ALT   --    --    --    --   15   --   14  13   --    --    --    --    < >   --    --    < >  16   CR   --   3.95*  3.60*   < >  2.83*   < >  3.05*  3.82*   < >  3.41   --    --    < >   --   4.34*   < >  3.4   GFRESTIMATED   --   15*  17*   < >  22*   < >  20*  15*   < >  17   --    --    < >   --   13*   < >  19.19   GFRESTBLACK   --   18*  20*   < >  26*   < >  24*  19*   < >  19   --    --    < >   --   16*   < >   --    POTASSIUM   --   4.3  4.3   < >  4.5   < >  3.5  3.7   < >  4.3   < >  4.3   < >   --   3.9   < >  5.4   TSH   --    --    --    --    --    --    --    --    --   1.66   --    --    --   0.97   --    --    --     < > = values in this interval not displayed.      BP Readings from Last 3 Encounters:   05/18/17 124/50   02/16/17 152/71   01/18/17 124/58    Wt Readings from Last 3 Encounters:   05/18/17 149 lb 8 oz (67.8 kg)   01/18/17 136 lb 3.2 oz (61.8 kg)   11/16/16 128 lb 1.4 oz (58.1 kg)        Labs reviewed in EPIC    Reviewed and updated as needed this visit by clinical staff  Tobacco  Allergies  Meds  Med Hx  Surg Hx  Fam Hx  Soc Hx      Reviewed and updated as needed this visit by Provider       ROS:  CONSTITUTIONAL: POSITIVE for successful weight gain and increased appetite, pt has been off Megace for a week and is feeling better, continues taking Boost nightly and then BID when has dialysis, NEGATIVE for fever, chills, change in weight  INTEGUMENTARY/SKIN: NEGATIVE for worrisome  rashes, moles or lesions  ENT/MOUTH: NEGATIVE for ear, mouth and throat problems  RESP: NEGATIVE for significant cough or SOB  CV: HTN - on Imdur, amlodipine, metoprolol, and hydralazine (Apresoline); NEGATIVE for chest pain, palpitations or peripheral edema  GI: POSITIVE for constipation - one stool softener per day; NEGATIVE for nausea, abdominal pain, heartburn, or change in bowel habits  MUSCULOSKELETAL: POSITIVE for pain in calves and restless legs; NEGATIVE for significant arthralgias or myalgia  NEURO: NEGATIVE for weakness, dizziness or paresthesias  ENDOCRINE: Diabetes - no current medications; Hyperlipidemia - on simvastatin; CKD stage 5 - undergoing hemodialysis since 11/2016; NEGATIVE for temperature intolerance, skin/hair changes  HEME/ALLERGY/IMMUNE: NEGATIVE for bleeding problems  PSYCHIATRIC: NEGATIVE for changes in mood or affect    This document serves as a record of the services and decisions personally performed and made by Shaila Silverman MD. It was created on her behalf by Solange Guzmán, a trained medical scribe. The creation of this document is based on the provider's statements to the medical scribe.   Solange Guzmán, 10:50 AM, May 18, 2017    OBJECTIVE:                                                    /50 (BP Location: Right arm, Patient Position: Chair, Cuff Size: Adult Regular)  Pulse 57  Temp 97.8  F (36.6  C) (Oral)  Resp 16  Wt 149 lb 8 oz (67.8 kg)  SpO2 95%  BMI 24.5 kg/m2  Body mass index is 24.5 kg/(m^2).  GENERAL APPEARANCE: healthy, alert and no distress  NECK: no bruits  RESP: lungs clear to auscultation - no rales, rhonchi or wheezes  CV: left brachial HD fistula with palpable thrill, regular rates and rhythm and normal S1 S2, no peripheral edema  ABDOMEN: soft, nontender  MS: use of walker for ambulation, extremities normal- no gross deformities noted  SKIN: no suspicious lesions or rashes  NEURO: mentation intact and speech normal  PSYCH: mentation appears normal and  affect normal/bright    Diagnostic Test Results:  none      ASSESSMENT/PLAN:                                                    (N18.4) CKD (chronic kidney disease) stage 4, GFR 15-29 ml/min (H) (primary encounter diagnosis)  Comment: Hemodialysis Tues and Sat, began 11/2016, HD fistula to left arm, results appear to be effective, patient appears healthier, affect has brightened, and increased appetite, will continue to monitor   Plan: Comprehensive metabolic panel, Ferritin; abnormal Cr due to end stage renal failure          (E78.5) Hyperlipidemia LDL goal <100  Comment: will do labs today despite pt not fasting, LDL historically at goal on simvastatin, anticipate no change in med/dosage   Lab Results   Component Value Date    LDL 44 04/19/2016    LDL 32 02/01/2016   Plan: simvastatin (ZOCOR) 40 MG tablet, Lipid panel         reflex to direct LDL, Comprehensive metabolic         panel           (I12.9,  N18.4) Benign hypertension with chronic kidney disease, stage V (H)  Comment: optimally controlled, will continue to monitor, no change in medications/dosage   Plan: hydrALAZINE (APRESOLINE) 100 MG TABS tablet, Comprehensive metabolic panel          (G25.81) Restless leg syndrome  Comment: Hemodialysis 2 times per week; getting iron infusion and Epo.; calf pain and restless leg, use of walker   Plan: Hemoglobin, Ferritin          (E11.59) Type 2 diabetes mellitus with other circulatory complication, without long-term current use of insulin (H)  Comment: A1C at goal most recently without medications, due for labs  Lab Results   Component Value Date    A1C 5.6 11/16/2016    A1C 5.2 07/25/2016   Plan: Comprehensive metabolic panel, Hemoglobin A1c          (N18.4) CKD (chronic kidney disease) stage 5, endstage  Comment: HD 2 times per wee; followed by [facility];  Plan: Comprehensive metabolic panel, Ferritin          MEDICATIONS:   Orders Placed This Encounter   Medications     Cholecalciferol (VITAMIN D3)  2000 UNITS CAPS     Sig: Take 1,000 Units by mouth daily     Dispense:  30 capsule     Refill:  0     simvastatin (ZOCOR) 40 MG tablet     Sig: TAKE 1 TABLET(40 MG) BY MOUTH AT BEDTIME     Dispense:  90 tablet     Refill:  1     isosorbide mononitrate (IMDUR) 30 MG 24 hr tablet     Sig: Take 1 tablet (30 mg) by mouth daily     Dispense:  90 tablet     Refill:  1     hydrALAZINE (APRESOLINE) 100 MG TABS tablet     Sig: Take 1 tablet (100 mg) by mouth 3 times daily     Dispense:  270 tablet     Refill:  1     Medications Discontinued During This Encounter   Medication Reason     simvastatin (ZOCOR) 40 MG tablet Medication Reconciliation Clean Up     Megestrol Acetate (MEGACE ORAL PO) Discontinued by another Health Care Provider     Cholecalciferol (VITAMIN D3 PO) Reorder     metoprolol (TOPROL-XL) 100 MG 24 hr tablet Medication Reconciliation Clean Up     simvastatin (ZOCOR) 40 MG tablet Reorder     isosorbide mononitrate (IMDUR) 30 MG 24 hr tablet Reorder     hydrALAZINE (APRESOLINE) 100 MG TABS tablet Reorder       FUTURE APPOINTMENTS:       - Follow-up visit in 3-4 months    Shaila Silverman MD  Internal Medicine  [facility] [place]    The information in this document, created by a medical scribe for me, accurately reflects the services I personally performed and the decisions made by me. I have reviewed and approved this document for accuracy.  Dr. Shaila Silverman, 11:08 AM, May 18, 2017

## 2017-05-19 LAB
ALBUMIN SERPL-MCNC: 3.7 G/DL (ref 3.4–5)
ALP SERPL-CCNC: 65 U/L (ref 40–150)
ALT SERPL W P-5'-P-CCNC: 15 U/L (ref 0–70)
ANION GAP SERPL CALCULATED.3IONS-SCNC: 12 MMOL/L (ref 3–14)
AST SERPL W P-5'-P-CCNC: 14 U/L (ref 0–45)
BILIRUB SERPL-MCNC: 0.6 MG/DL (ref 0.2–1.3)
BUN SERPL-MCNC: 69 MG/DL (ref 7–30)
CALCIUM SERPL-MCNC: 9.2 MG/DL (ref 8.5–10.1)
CHLORIDE SERPL-SCNC: 100 MMOL/L (ref 94–109)
CHOLEST SERPL-MCNC: 105 MG/DL
CO2 SERPL-SCNC: 27 MMOL/L (ref 20–32)
CREAT SERPL-MCNC: 5.64 MG/DL (ref 0.66–1.25)
FERRITIN SERPL-MCNC: 610 NG/ML (ref 26–388)
GFR SERPL CREATININE-BSD FRML MDRD: 10 ML/MIN/1.7M2
GLUCOSE SERPL-MCNC: 182 MG/DL (ref 70–99)
HDLC SERPL-MCNC: 34 MG/DL
LDLC SERPL CALC-MCNC: 48 MG/DL
NONHDLC SERPL-MCNC: 71 MG/DL
POTASSIUM SERPL-SCNC: 4.7 MMOL/L (ref 3.4–5.3)
PROT SERPL-MCNC: 7.4 G/DL (ref 6.8–8.8)
SODIUM SERPL-SCNC: 139 MMOL/L (ref 133–144)
TRIGL SERPL-MCNC: 115 MG/DL

## 2017-05-20 ENCOUNTER — TRANSFERRED RECORDS (OUTPATIENT)
Dept: HEALTH INFORMATION MANAGEMENT | Facility: CLINIC | Age: 77
End: 2017-05-20

## 2017-05-26 DIAGNOSIS — I73.9 PERIPHERAL VASCULAR DISEASE (H): ICD-10-CM

## 2017-05-26 NOTE — TELEPHONE ENCOUNTER
Discontinued  METOPROLOL ER SUCCINATE 100MG TABS  In chart as: metoprolol (TOPROL-XL) 100 MG 24 hr tablet  The source prescription has been discontinued

## 2017-05-31 RX ORDER — METOPROLOL SUCCINATE 100 MG/1
TABLET, EXTENDED RELEASE ORAL
Qty: 90 TABLET | Refills: 1 | Status: SHIPPED | OUTPATIENT
Start: 2017-05-31 | End: 2017-01-01 | Stop reason: DRUGHIGH

## 2017-05-31 NOTE — TELEPHONE ENCOUNTER
Huddled with Dr. Silverman, and ok to order this medication.  This was sent with refills.   Kassandra Machado, RN  Triage Nurse

## 2017-06-06 ENCOUNTER — TELEPHONE (OUTPATIENT)
Dept: FAMILY MEDICINE | Facility: CLINIC | Age: 77
End: 2017-06-06

## 2017-06-06 NOTE — TELEPHONE ENCOUNTER
Wife had called asking for metoprolol prescription in 50 mg dose so she could give patient 3 instead of splitting tabs. Current prescription states Metoprolol  mg daily. Patient takes 150 mg daily. Also has historical metoprolol 75 mg on med list. Last 2 dialysis visits states patient taking 75mg daily.  Report from [facility] 10/14/2016 states to decrease Toprol to 75 mg daily. Spoke with nurse at [facility] to confirm dosing with their records.     Huddled with Dr. Silverman. Would defer to nephrology for HTN med dosing. Spoke with wife concerning dosage of 75 mg daily and that pharmacy would send request to Dr. Goel to send prescription. Spoke with nurse at [facility] to inform of situation wit med dosing and that pharmacy would be sending request to Dr. Goel.    Shania Golden RN

## 2017-06-12 DIAGNOSIS — R12 HEARTBURN: ICD-10-CM

## 2017-06-13 NOTE — TELEPHONE ENCOUNTER
omeprazole (PRILOSEC) 20 MG      Last Written Prescription Date: 10/17/16  Last Fill Quantity: 30,  # refills: 7   Last Office Visit with [facility], [facility] or [facility] prescribing provider: 5/1/16                                         Next 5 appointments (look out 90 days)     Aug 17, 2017 10:45 AM CDT   Return Visit with Yaw Baker MD   [facility] [place] ([facility])    [address] [address] [address] [phone]

## 2017-06-15 NOTE — TELEPHONE ENCOUNTER
LOV 5-18-17  Prescription approved per [facility] Refill Protocol.  Urvashi Price RN

## 2017-06-16 DIAGNOSIS — E78.5 HYPERLIPIDEMIA LDL GOAL <100: ICD-10-CM

## 2017-06-19 NOTE — TELEPHONE ENCOUNTER
Written 5/18/17       Disp Refills Start End FANTASMA   simvastatin (ZOCOR) 40 MG tablet 90 tablet 1 5/18/2017

## 2017-06-21 RX ORDER — SIMVASTATIN 40 MG
TABLET ORAL
Qty: 30 TABLET | Refills: 0 | OUTPATIENT
Start: 2017-06-21

## 2017-08-17 NOTE — MR AVS SNAPSHOT
After Visit Summary   8/17/2017    Bean Croft    MRN: [id number]           Patient Information     Date Of Birth          1940        Visit Information        Provider Department      8/17/2017 10:45 AM Yaw Baker MD [facility]        Today's Diagnoses     Coronary artery disease involving native coronary artery of native heart without angina pectoris        Essential hypertension with goal blood pressure less than 140/90           Follow-ups after your visit        Additional Services     Follow-Up with Cardiologist                 Future tests that were ordered for you today     Open Future Orders        Priority Expected Expires Ordered    Follow-Up with Cardiologist Routine 8/17/2018 8/18/2018 8/17/2017            Who to contact     If you have questions or need follow up information about today's clinic visit or your schedule please contact [facility] directly at [phone].  Normal or non-critical lab and imaging results will be communicated to you by MyChart, letter or phone within 4 business days after the clinic has received the results. If you do not hear from us within 7 days, please contact the clinic through [facility] [employer] [facility] or phone. If you have a critical or abnormal lab result, we will notify you by phone as soon as possible.  Submit refill requests through [facility] or call your pharmacy and they will forward the refill request to us. Please allow 3 business days for your refill to be completed.          Additional Information About Your Visit        MyChart Information     [facility] gives you secure access to your electronic health record. If you see a primary care provider, you can also send messages to your care team and make appointments. If you have questions, please call your primary care clinic.  If you do not have a primary care provider, please call [phone] and they will  "assist you.        Care EveryWhere ID     This is your Care EveryWhere ID. This could be used by other organizations to access your [place] medical records  [id number]        Your Vitals Were     Pulse Height Pulse Oximetry BMI (Body Mass Index)          45 1.664 m (5' 5.5\") 96% 23.35 kg/m2         Blood Pressure from Last 3 Encounters:   08/17/17 126/60   05/18/17 124/50   02/16/17 152/71    Weight from Last 3 Encounters:   08/17/17 64.6 kg (142 lb 8 oz)   05/18/17 67.8 kg (149 lb 8 oz)   01/18/17 61.8 kg (136 lb 3.2 oz)              We Performed the Following     Follow-Up with Cardiologist          Today's Medication Changes          These changes are accurate as of: 8/17/17 11:33 AM.  If you have any questions, ask your nurse or doctor.               These medicines have changed or have updated prescriptions.        Dose/Directions    metoprolol 50 MG 24 hr tablet   Commonly known as:  TOPROL-XL   This may have changed:    - medication strength  - how much to take  - additional instructions  - Another medication with the same name was removed. Continue taking this medication, and follow the directions you see here.   Used for:  Coronary artery disease involving native coronary artery of native heart without angina pectoris, Essential hypertension with goal blood pressure less than 140/90   Changed by:  Yaw Baker MD        Dose:  50 mg   Take 1 tablet (50 mg) by mouth daily   Quantity:  30 tablet   Refills:  11         Stop taking these medicines if you haven't already. Please contact your care team if you have questions.     isosorbide mononitrate 30 MG 24 hr tablet   Commonly known as:  IMDUR   Stopped by:  Ywa Baker MD                Where to get your medicines      These medications were sent to [facility] [address] [place], [place] - [address] AT [facility] [address] & [facility]  [address] [address] Pending sale to [facility] [address]     Phone:  [phone]     metoprolol 50 MG " 24 hr tablet                Primary Care Provider Office Phone # Fax #    Shaila Silverman [phone]       [address]  [facility] [address]        Equal Access to Services     YOSHI COTO : Hadii sylwia ku hadallysono Soomaali, waaxda luqadaha, qaybta kaalmada adeegyada, madison chaudharin melanie oro laTroyjack richards. So [facility] [phone].    ATENCIÓN: Si habla español, tiene a tidwell disposición servicios gratuitos de asistencia lingüística. Llame al [phone].    We comply with applicable federal civil rights laws and [place] laws. We do not discriminate on the basis of race, color, national origin, age, disability sex, sexual orientation or gender identity.            Thank you!     Thank you for choosing [facility] [place]  for your care. Our goal is always to provide you with excellent care. Hearing back from our patients is one way we can continue to improve our services. Please take a few minutes to complete the written survey that you may receive in the mail after your visit with us. Thank you!             Your Updated Medication List - Protect others around you: Learn how to safely use, store and throw away your medicines at www.disposemymeds.org.          This list is accurate as of: 8/17/17 11:33 AM.  Always use your most recent med list.                   Brand Name Dispense Instructions for use Diagnosis    allopurinol 100 MG tablet    ZYLOPRIM    90 tablet    Take 1 tablet (100 mg) by mouth daily    Acute gouty arthropathy       amLODIPine 10 MG tablet    NORVASC    90 tablet    Take 1 tablet (10 mg) by mouth daily    CKD (chronic kidney disease) stage 4, GFR 15-29 ml/min (H), Benign hypertension with chronic kidney disease, stage IV (H)       aspirin 81 MG EC tablet      Take 81 mg by mouth daily        docusate sodium 100 MG capsule    COLACE     Take 100 mg by mouth 2 times daily        hydrALAZINE 100 MG Tabs tablet    APRESOLINE    270 tablet     Take 1 tablet (100 mg) by mouth 3 times daily    Benign hypertension with chronic kidney disease, stage IV (H)       metoprolol 50 MG 24 hr tablet    TOPROL-XL    30 tablet    Take 1 tablet (50 mg) by mouth daily    Coronary artery disease involving native coronary artery of native heart without angina pectoris, Essential hypertension with goal blood pressure less than 140/90       omeprazole 20 MG CR capsule    priLOSEC    90 capsule    TAKE 1 CAPSULE(20 MG) BY MOUTH DAILY    Heartburn       simvastatin 40 MG tablet    ZOCOR    90 tablet    TAKE 1 TABLET(40 MG) BY MOUTH AT BEDTIME    Hyperlipidemia LDL goal <100       vitamin D3 2000 UNITS Caps     30 capsule    Take 1,000 Units by mouth daily

## 2017-08-17 NOTE — PROGRESS NOTES
HISTORY OF PRESENT ILLNESS:  No angina. On dialysis.     Orders this Visit:  No orders of the defined types were placed in this encounter.    Orders Placed This Encounter   Medications     metoprolol (TOPROL-XL) 50 MG 24 hr tablet     Sig: Take 1 tablet (50 mg) by mouth daily     Dispense:  30 tablet     Refill:  11     Medications Discontinued During This Encounter   Medication Reason     metoprolol (TOPROL-XL) 100 MG 24 hr tablet Dose adjustment     METOPROLOL SUCCINATE ER PO      isosorbide mononitrate (IMDUR) 30 MG 24 hr tablet        Encounter Diagnoses   Name Primary?     Coronary artery disease involving native coronary artery of native heart without angina pectoris      Essential hypertension with goal blood pressure less than 140/90        CURRENT MEDICATIONS:  Current Outpatient Prescriptions   Medication Sig Dispense Refill     metoprolol (TOPROL-XL) 50 MG 24 hr tablet Take 1 tablet (50 mg) by mouth daily 30 tablet 11     omeprazole (PRILOSEC) 20 MG CR capsule TAKE 1 CAPSULE(20 MG) BY MOUTH DAILY 90 capsule 3     Cholecalciferol (VITAMIN D3) 2000 UNITS CAPS Take 1,000 Units by mouth daily 30 capsule 0     simvastatin (ZOCOR) 40 MG tablet TAKE 1 TABLET(40 MG) BY MOUTH AT BEDTIME 90 tablet 1     hydrALAZINE (APRESOLINE) 100 MG TABS tablet Take 1 tablet (100 mg) by mouth 3 times daily 270 tablet 1     allopurinol (ZYLOPRIM) 100 MG tablet Take 1 tablet (100 mg) by mouth daily 90 tablet 3     amLODIPine (NORVASC) 10 MG tablet Take 1 tablet (10 mg) by mouth daily 90 tablet 3     docusate sodium (COLACE) 100 MG capsule Take 100 mg by mouth 2 times daily       aspirin 81 MG EC tablet Take 81 mg by mouth daily       [DISCONTINUED] metoprolol (TOPROL-XL) 100 MG 24 hr tablet TAKE 1 TABLET BY MOUTH EVERY DAY 90 tablet 1     [DISCONTINUED] METOPROLOL SUCCINATE ER PO Take 75 mg by mouth daily 50 mg x 1.5 tabs         ALLERGIES     Allergies   Allergen Reactions     Ace Inhibitors Cough       PAST MEDICAL, SURGICAL,  "FAMILY, SOCIAL HISTORY:  History was reviewed and updated as needed, see medical record.    Review of Systems:  A 12-point review of systems was completed, see medical record for detailed review of systems information.    Physical Exam:  Vitals: /60 (BP Location: Right arm, Patient Position: Sitting, Cuff Size: Adult Regular)  Pulse (!) 45  Ht 1.664 m (5' 5.5\")  Wt 64.6 kg (142 lb 8 oz)  SpO2 96%  BMI 23.35 kg/m2    Constitutional:  cooperative, alert and oriented, well developed, well nourished, in no acute distress        Skin:  warm and dry to the touch, no apparent skin lesions or masses noted   left arm fistula    Head:  normocephalic, no masses or lesions        Eyes:  pupils equal and round, conjunctivae and lids unremarkable, sclera white, no xanthalasma, EOMS intact, no nystagmus        ENT:  no pallor or cyanosis, dentition good        Neck:  carotid pulses are full and equal bilaterally, JVP normal, no carotid bruit, no thyromegaly        Chest:  normal breath sounds, clear to auscultation, normal A-P diameter, normal symmetry, normal respiratory excursion, no use of accessory muscles        Cardiac: regular rhythm, normal S1/S2, no S3 or S4, apical impulse not displaced, no murmurs, gallops or rubs                  Abdomen:  abdomen soft, non-tender, BS normoactive, no mass, no HSM, no bruits        Vascular: pulses full and equal, no bruits auscultated                                      Extremities and Back:  no deformities, clubbing, cyanosis, erythema observed        Neurological:  affect appropriate, oriented to time, person and place        ASSESSMENT: stable. Bradycardia.     RECOMMENDATIONS:   Cut metoprolol to 50 daily  Stop isosorbide mononitrate  Follow up in one year      Recent Lab Results:  LIPID RESULTS:  Lab Results   Component Value Date    CHOL 105 05/18/2017    HDL 34 (L) 05/18/2017    LDL 48 05/18/2017    TRIG 115 05/18/2017    CHOLHDLRATIO 2.4 12/27/2014       LIVER " ENZYME RESULTS:  Lab Results   Component Value Date    AST 14 05/18/2017    ALT 15 05/18/2017       CBC RESULTS:  Lab Results   Component Value Date    WBC 10.2 11/16/2016    RBC 2.04 (L) 11/16/2016    HGB 10.6 (L) 05/18/2017    HCT 20.7 (L) 11/16/2016     (H) 11/16/2016    MCH 32.8 11/16/2016    MCHC 32.4 11/16/2016    RDW 14.7 11/16/2016     11/16/2016       BMP RESULTS:  Lab Results   Component Value Date     05/18/2017    POTASSIUM 4.7 05/18/2017    CHLORIDE 100 05/18/2017    CO2 27 05/18/2017    ANIONGAP 12 05/18/2017     (H) 05/18/2017    BUN 69 (H) 05/18/2017    CR 5.64 (H) 05/18/2017    GFRESTIMATED 10 (L) 05/18/2017    GFRESTBLACK 12 (L) 05/18/2017    TAMMY 9.2 05/18/2017        A1C RESULTS:  Lab Results   Component Value Date    A1C 6.1 (H) 05/18/2017       INR RESULTS:  Lab Results   Component Value Date    INR 1.00 11/16/2016    INR 0.99 05/11/2015       We greatly appreciate the opportunity to be involved in the care of your patient, Bean Croft.    Sincerely,  Yaw Baker MD        Yaw Baker MD  [address]  [place] [address]

## 2017-08-17 NOTE — LETTER
8/17/2017    Shaila Silverman MD  [address]  [facility] [address]    RE: Bean Croft       Dear Colleague,    I had the pleasure of seeing Bean Croft in the [facility].    Bean Croft, a 77-year-old man with coronary artery disease, atherosclerotic peripheral vascular disease, diabetes mellitus, dyslipidemia, hypertension and end-stage renal disease, now on dialysis, was seen today at your request for followup.        The patient sustained an anterior wall infarction in 1996 while traveling to [place].  He was given intravenous streptokinase and successfully perfused.  He returned home, underwent diagnostic coronary angiography and then angioplasty of the mid-LAD.  At the time of his angiogram in 1996, there was no significant narrowing in the left main, nondominant circumflex or dominant right coronary.  A dobutamine stress echo in 2015 showed no ischemia with a normal ejection fraction.      Since I saw the patient last year, the patient is now on dialysis.  He has been able to avoid smoking.  He remains free of chest, arm or back discomfort with activity.  His blood pressure has been satisfactorily controlled.  His heart rate tends to run in the low 50s and upper 40s recently.     There has been no syncope or lightheadedness.      PAST MEDICAL HISTORY:   1.  Diabetes mellitus.     a.  End-stage renal disease.  Currently, undergoing dialysis twice a week.   b.  Retinopathy, has been treated with laser treatments.   c.  History of neuropathy.   2.  Hypertension.   3.  Dyslipidemia.   4.  Atherosclerotic peripheral vascular disease:   a. History of claudication with no narrowings in the femoral or popliteal arteries.  Being followed clinically.   b. History of carotid endarterectomy.  Ultrasound study recently showing no evidence of restenosis.   5.  Dyslipidemia.   6.  Sensorineural hearing loss.      PHYSICAL EXAMINATION:   GENERAL:  Today,  demonstrates a very pleasant, cooperative and intelligent 77-year-old man.   VITAL SIGNS:  Blood pressure is 126/60, heart rate is 45.  His height is 1.6 meters.  His weight is 65 kg.  His BMI is 23.4.   LUNGS:  Clear to percussion and auscultation.   CARDIOVASCULAR:  Shows a normal S1 with a normal S2 and soft S4.  There is no S3.  There is no murmur, rub or click.   EXTREMITIES:  He has a left arm fistula.      LABORATORY STUDIES:  His most recent lipid profile from 05/2017 showed an LDL cholesterol of 48.  His most recent potassium was 4.7 in May.  His most recent hemoglobin was 10.6.  Liver function studies were normal in 05/2017.     Outpatient Encounter Prescriptions as of 8/17/2017   Medication Sig Dispense Refill     metoprolol (TOPROL-XL) 50 MG 24 hr tablet Take 1 tablet (50 mg) by mouth daily 30 tablet 11     omeprazole (PRILOSEC) 20 MG CR capsule TAKE 1 CAPSULE(20 MG) BY MOUTH DAILY 90 capsule 3     Cholecalciferol (VITAMIN D3) 2000 UNITS CAPS Take 1,000 Units by mouth daily 30 capsule 0     simvastatin (ZOCOR) 40 MG tablet TAKE 1 TABLET(40 MG) BY MOUTH AT BEDTIME 90 tablet 1     hydrALAZINE (APRESOLINE) 100 MG TABS tablet Take 1 tablet (100 mg) by mouth 3 times daily 270 tablet 1     allopurinol (ZYLOPRIM) 100 MG tablet Take 1 tablet (100 mg) by mouth daily 90 tablet 3     amLODIPine (NORVASC) 10 MG tablet Take 1 tablet (10 mg) by mouth daily 90 tablet 3     docusate sodium (COLACE) 100 MG capsule Take 100 mg by mouth 2 times daily       aspirin 81 MG EC tablet Take 81 mg by mouth daily       [DISCONTINUED] metoprolol (TOPROL-XL) 100 MG 24 hr tablet TAKE 1 TABLET BY MOUTH EVERY DAY 90 tablet 1     [DISCONTINUED] isosorbide mononitrate (IMDUR) 30 MG 24 hr tablet Take 1 tablet (30 mg) by mouth daily 90 tablet 1     [DISCONTINUED] METOPROLOL SUCCINATE ER PO Take 75 mg by mouth daily 50 mg x 1.5 tabs       No facility-administered encounter medications on file as of 8/17/2017.       ASSESSMENT:    Guerda remained asymptomatic on guideline-directed medical therapy with optimal LDL cholesterol and systolic blood pressures.  Although he is asymptomatic, he is fairly bradycardic and I would recommend that we decrease metoprolol from 75 to 50 mg a day.  In the absence of angina, there is no clear indication for nitrate therapy and I would recommend stopping isosorbide.      RECOMMENDATIONS:   1.  Decrease metoprolol from 75 to 50 mg a day.   2.  Discontinue isosorbide.   3.  Continue statin, aspirin and ideal blood pressure control.   4.  Dialysis, per Renal Service.   5.  Follow up with me in 1 year.      We greatly appreciate the opportunity to see your patient, Mr. Bean Croft.      Sincerely,    Yaw Baker MD     [facility]

## 2017-08-17 NOTE — PROGRESS NOTES
HISTORY OF PRESENT ILLNESS:  Bean Croft, a 77-year-old man with coronary artery disease, atherosclerotic peripheral vascular disease, diabetes mellitus, dyslipidemia, hypertension and end-stage renal disease, now on dialysis, was seen today at your request for followup.        The patient sustained an anterior wall infarction in 1996 while traveling to [place].  He was given intravenous streptokinase and successfully perfused.  He returned home, underwent diagnostic coronary angiography and then angioplasty of the mid-LAD.  At the time of his angiogram in 1996, there was no significant narrowing in the left main, nondominant circumflex or dominant right coronary.  A dobutamine stress echo in 2015 showed no ischemia with a normal ejection fraction.      Since I saw the patient last year, the patient is now on dialysis.  He has been able to avoid smoking.  He remains free of chest, arm or back discomfort with activity.  His blood pressure has been satisfactorily controlled.  His heart rate tends to run in the low 50s and upper 40s recently.     There has been no syncope or lightheadedness.      PAST MEDICAL HISTORY:   1.  Diabetes mellitus.     a.  End-stage renal disease.  Currently, undergoing dialysis twice a week.   b.  Retinopathy, has been treated with laser treatments.   c.  History of neuropathy.   2.  Hypertension.   3.  Dyslipidemia.   4.  Atherosclerotic peripheral vascular disease:   a. History of claudication with no narrowings in the femoral or popliteal arteries.  Being followed clinically.   b. History of carotid endarterectomy.  Ultrasound study recently showing no evidence of restenosis.   5.  Dyslipidemia.   6.  Sensorineural hearing loss.      PHYSICAL EXAMINATION:   GENERAL:  Today, demonstrates a very pleasant, cooperative and intelligent 77-year-old man.   VITAL SIGNS:  Blood pressure is 126/60, heart rate is 45.  His height is 1.6 meters.  His weight is 65 kg.  His BMI is 23.4.    LUNGS:  Clear to percussion and auscultation.   CARDIOVASCULAR:  Shows a normal S1 with a normal S2 and soft S4.  There is no S3.  There is no murmur, rub or click.   EXTREMITIES:  He has a left arm fistula.      LABORATORY STUDIES:  His most recent lipid profile from 2017 showed an LDL cholesterol of 48.  His most recent potassium was 4.7 in May.  His most recent hemoglobin was 10.6.  Liver function studies were normal in 2017.      ASSESSMENT:  Mr. Klein remained asymptomatic on guideline-directed medical therapy with optimal LDL cholesterol and systolic blood pressures.  Although he is asymptomatic, he is fairly bradycardic and I would recommend that we decrease metoprolol from 75 to 50 mg a day.  In the absence of angina, there is no clear indication for nitrate therapy and I would recommend stopping isosorbide.      RECOMMENDATIONS:   1.  Decrease metoprolol from 75 to 50 mg a day.   2.  Discontinue isosorbide.   3.  Continue statin, aspirin and ideal blood pressure control.   4.  Dialysis, per Renal Service.   5.  Follow up with me in 1 year.      We greatly appreciate the opportunity to see your patient, Mr. Edmond Klein.       Yaw Baker MD       cc:   Shaila Silverman MD    [facility]    [address] [place] [address]         YAW BAKER MD             D: 2017 11:37   T: 2017 13:25   MT: [id number]      Name:     EDMOND KLEIN   MRN:      [id number]        Account:      [id number]   :      1940           Service Date: 2017      Document: [id number]

## 2017-12-20 NOTE — TELEPHONE ENCOUNTER
Requested Prescriptions   Pending Prescriptions Disp Refills     simvastatin (ZOCOR) 40 MG tablet [Pharmacy Med Name: SIMVASTATIN 40MG TABLETS]  Last Written Prescription Date:  5/18/2017  Last Fill Quantity: 90,  # refills: 1   Last Office Visit with [facility], [facility] or [facility] prescribing provider:  5/18/2017   Future Office Visit:    90 tablet 0     Sig: TAKE 1 TABLET(40 MG) BY MOUTH AT BEDTIME    Statins Protocol Passed    12/20/2017  3:16 AM       Passed - LDL on file in past 12 months    Recent Labs   Lab Test  05/18/17   1112   LDL  48            Passed - No abnormal creatine kinase in past 12 months    No lab results found.         Passed - Recent or future visit with authorizing provider    Patient had office visit in the last year or has a visit in the next 30 days with authorizing provider.  See chart review.              Passed - Patient is age 18 or older

## 2018-01-01 ENCOUNTER — TRANSFERRED RECORDS (OUTPATIENT)
Dept: HEALTH INFORMATION MANAGEMENT | Facility: CLINIC | Age: 78
End: 2018-01-01

## 2018-01-01 ENCOUNTER — HOSPITAL ENCOUNTER (OUTPATIENT)
Dept: CT IMAGING | Facility: CLINIC | Age: 78
Discharge: HOME | End: 2018-05-11
Attending: PSYCHIATRY & NEUROLOGY | Admitting: PSYCHIATRY & NEUROLOGY
Payer: MEDICARE

## 2018-01-01 ENCOUNTER — APPOINTMENT (OUTPATIENT)
Dept: CT IMAGING | Facility: CLINIC | Age: 78
DRG: 280 | End: 2018-01-01
Attending: SURGERY
Payer: MEDICARE

## 2018-01-01 ENCOUNTER — APPOINTMENT (OUTPATIENT)
Dept: GENERAL RADIOLOGY | Facility: CLINIC | Age: 78
DRG: 280 | End: 2018-01-01
Payer: MEDICARE

## 2018-01-01 ENCOUNTER — OFFICE VISIT (OUTPATIENT)
Dept: FAMILY MEDICINE | Facility: CLINIC | Age: 78
End: 2018-01-01
Payer: MEDICARE

## 2018-01-01 ENCOUNTER — OFFICE VISIT (OUTPATIENT)
Dept: CARDIOLOGY | Facility: CLINIC | Age: 78
End: 2018-01-01
Attending: INTERNAL MEDICINE
Payer: MEDICARE

## 2018-01-01 ENCOUNTER — HOSPITAL ENCOUNTER (OUTPATIENT)
Dept: ULTRASOUND IMAGING | Facility: CLINIC | Age: 78
Discharge: HOME | End: 2018-03-14
Attending: INTERNAL MEDICINE | Admitting: INTERNAL MEDICINE
Payer: MEDICARE

## 2018-01-01 ENCOUNTER — APPOINTMENT (OUTPATIENT)
Dept: GENERAL RADIOLOGY | Facility: CLINIC | Age: 78
DRG: 280 | End: 2018-01-01
Attending: EMERGENCY MEDICINE
Payer: MEDICARE

## 2018-01-01 ENCOUNTER — PATIENT OUTREACH (OUTPATIENT)
Dept: CARE COORDINATION | Facility: CLINIC | Age: 78
End: 2018-01-01

## 2018-01-01 ENCOUNTER — HOSPITAL ENCOUNTER (INPATIENT)
Facility: CLINIC | Age: 78
LOS: 4 days | Discharge: NURSING, REHAB OR LONG-TERM CARE | DRG: 280 | End: 2018-08-14
Attending: EMERGENCY MEDICINE | Admitting: INTERNAL MEDICINE
Payer: MEDICARE

## 2018-01-01 ENCOUNTER — APPOINTMENT (OUTPATIENT)
Dept: CARDIOLOGY | Facility: CLINIC | Age: 78
DRG: 280 | End: 2018-01-01
Attending: SURGERY
Payer: MEDICARE

## 2018-01-01 VITALS
DIASTOLIC BLOOD PRESSURE: 50 MMHG | RESPIRATION RATE: 17 BRPM | TEMPERATURE: 97.3 F | SYSTOLIC BLOOD PRESSURE: 128 MMHG | BODY MASS INDEX: 23.66 KG/M2 | WEIGHT: 142 LBS | HEIGHT: 65 IN | HEART RATE: 55 BPM | OXYGEN SATURATION: 98 %

## 2018-01-01 VITALS
TEMPERATURE: 97.8 F | OXYGEN SATURATION: 98 % | HEART RATE: 65 BPM | WEIGHT: 138.1 LBS | DIASTOLIC BLOOD PRESSURE: 46 MMHG | BODY MASS INDEX: 22.98 KG/M2 | SYSTOLIC BLOOD PRESSURE: 124 MMHG | RESPIRATION RATE: 17 BRPM

## 2018-01-01 VITALS
DIASTOLIC BLOOD PRESSURE: 48 MMHG | SYSTOLIC BLOOD PRESSURE: 122 MMHG | RESPIRATION RATE: 17 BRPM | OXYGEN SATURATION: 97 % | BODY MASS INDEX: 23.23 KG/M2 | WEIGHT: 139.6 LBS | HEART RATE: 57 BPM | TEMPERATURE: 97.6 F

## 2018-01-01 VITALS
DIASTOLIC BLOOD PRESSURE: 50 MMHG | OXYGEN SATURATION: 96 % | SYSTOLIC BLOOD PRESSURE: 108 MMHG | HEART RATE: 61 BPM | BODY MASS INDEX: 23.32 KG/M2 | WEIGHT: 140 LBS | HEIGHT: 65 IN

## 2018-01-01 VITALS
OXYGEN SATURATION: 78 % | WEIGHT: 139.11 LBS | HEART RATE: 97 BPM | TEMPERATURE: 100 F | SYSTOLIC BLOOD PRESSURE: 104 MMHG | RESPIRATION RATE: 18 BRPM | DIASTOLIC BLOOD PRESSURE: 52 MMHG | BODY MASS INDEX: 23.15 KG/M2

## 2018-01-01 DIAGNOSIS — N18.6 CKD (CHRONIC KIDNEY DISEASE) STAGE V REQUIRING CHRONIC DIALYSIS (H): ICD-10-CM

## 2018-01-01 DIAGNOSIS — D63.1 ANEMIA IN CHRONIC KIDNEY DISEASE, ON CHRONIC DIALYSIS (H): Primary | ICD-10-CM

## 2018-01-01 DIAGNOSIS — M10.9 ACUTE GOUTY ARTHROPATHY: ICD-10-CM

## 2018-01-01 DIAGNOSIS — H26.9 CATARACT OF BOTH EYES, UNSPECIFIED CATARACT TYPE: ICD-10-CM

## 2018-01-01 DIAGNOSIS — F03.90 DEMENTIA (H): Primary | ICD-10-CM

## 2018-01-01 DIAGNOSIS — T82.898A OTHER SPECIFIED COMPLICATION OF VASCULAR PROSTHETIC DEVICES, IMPLANTS AND GRAFTS, INITIAL ENCOUNTER (H): ICD-10-CM

## 2018-01-01 DIAGNOSIS — J81.0 ACUTE PULMONARY EDEMA (H): ICD-10-CM

## 2018-01-01 DIAGNOSIS — Z99.2 ESRD (END STAGE RENAL DISEASE) ON DIALYSIS (H): ICD-10-CM

## 2018-01-01 DIAGNOSIS — E78.5 HYPERLIPIDEMIA LDL GOAL <100: ICD-10-CM

## 2018-01-01 DIAGNOSIS — R79.89 ELEVATED TROPONIN: ICD-10-CM

## 2018-01-01 DIAGNOSIS — E11.59 TYPE 2 DIABETES MELLITUS WITH OTHER CIRCULATORY COMPLICATION, WITHOUT LONG-TERM CURRENT USE OF INSULIN (H): ICD-10-CM

## 2018-01-01 DIAGNOSIS — K92.2 GASTROINTESTINAL HEMORRHAGE, UNSPECIFIED GASTROINTESTINAL HEMORRHAGE TYPE: ICD-10-CM

## 2018-01-01 DIAGNOSIS — Z01.818 PREOP GENERAL PHYSICAL EXAM: Primary | ICD-10-CM

## 2018-01-01 DIAGNOSIS — N18.4 BENIGN HYPERTENSION WITH CHRONIC KIDNEY DISEASE, STAGE IV (H): ICD-10-CM

## 2018-01-01 DIAGNOSIS — I25.10 CORONARY ARTERY DISEASE INVOLVING NATIVE CORONARY ARTERY OF NATIVE HEART WITHOUT ANGINA PECTORIS: ICD-10-CM

## 2018-01-01 DIAGNOSIS — N18.6 ANEMIA IN CHRONIC KIDNEY DISEASE, ON CHRONIC DIALYSIS (H): Primary | ICD-10-CM

## 2018-01-01 DIAGNOSIS — I50.9 CHF EXACERBATION (H): ICD-10-CM

## 2018-01-01 DIAGNOSIS — H25.9 SENILE CATARACT OF LEFT EYE, UNSPECIFIED AGE-RELATED CATARACT TYPE: ICD-10-CM

## 2018-01-01 DIAGNOSIS — F03.90 DEMENTIA (H): ICD-10-CM

## 2018-01-01 DIAGNOSIS — I12.9 BENIGN HYPERTENSION WITH CHRONIC KIDNEY DISEASE, STAGE IV (H): ICD-10-CM

## 2018-01-01 DIAGNOSIS — E78.5 HYPERLIPIDEMIA, UNSPECIFIED HYPERLIPIDEMIA TYPE: ICD-10-CM

## 2018-01-01 DIAGNOSIS — E11.59 TYPE 2 DIABETES MELLITUS WITH OTHER CIRCULATORY COMPLICATION, WITHOUT LONG-TERM CURRENT USE OF INSULIN (H): Primary | ICD-10-CM

## 2018-01-01 DIAGNOSIS — Z51.5 HOSPICE CARE PATIENT: ICD-10-CM

## 2018-01-01 DIAGNOSIS — I10 ESSENTIAL HYPERTENSION WITH GOAL BLOOD PRESSURE LESS THAN 140/90: ICD-10-CM

## 2018-01-01 DIAGNOSIS — N18.6 ESRD (END STAGE RENAL DISEASE) ON DIALYSIS (H): ICD-10-CM

## 2018-01-01 DIAGNOSIS — R60.9 EDEMA, UNSPECIFIED TYPE: ICD-10-CM

## 2018-01-01 DIAGNOSIS — Z99.2 ANEMIA IN CHRONIC KIDNEY DISEASE, ON CHRONIC DIALYSIS (H): Primary | ICD-10-CM

## 2018-01-01 DIAGNOSIS — Z99.2 DEPENDENCE ON HEMODIALYSIS (H): ICD-10-CM

## 2018-01-01 DIAGNOSIS — I10 ESSENTIAL HYPERTENSION: ICD-10-CM

## 2018-01-01 DIAGNOSIS — Z99.2 CKD (CHRONIC KIDNEY DISEASE) STAGE V REQUIRING CHRONIC DIALYSIS (H): ICD-10-CM

## 2018-01-01 DIAGNOSIS — J96.01 ACUTE RESPIRATORY FAILURE WITH HYPOXIA (H): ICD-10-CM

## 2018-01-01 DIAGNOSIS — I73.9 PERIPHERAL VASCULAR DISEASE (H): ICD-10-CM

## 2018-01-01 DIAGNOSIS — R12 HEARTBURN: ICD-10-CM

## 2018-01-01 DIAGNOSIS — R79.1 PROLONGED BLEEDING TIME: ICD-10-CM

## 2018-01-01 LAB
ABO + RH BLD: NORMAL
ABO + RH BLD: NORMAL
ALBUMIN SERPL-MCNC: 3 G/DL (ref 3.4–5)
ALBUMIN SERPL-MCNC: 3.9 G/DL (ref 3.4–5)
ALBUMIN UR-MCNC: 30 MG/DL
ALP SERPL-CCNC: 58 U/L (ref 40–150)
ALP SERPL-CCNC: 64 U/L (ref 40–150)
ALT SERPL W P-5'-P-CCNC: 15 U/L (ref 0–70)
ALT SERPL W P-5'-P-CCNC: 16 U/L (ref 0–70)
ALT SERPL-CCNC: 10 U/L (ref 7–52)
AMORPH CRY #/AREA URNS HPF: ABNORMAL /HPF
ANION GAP SERPL CALCULATED.3IONS-SCNC: 11 MMOL/L (ref 3–14)
ANION GAP SERPL CALCULATED.3IONS-SCNC: 11 MMOL/L (ref 3–14)
ANION GAP SERPL CALCULATED.3IONS-SCNC: 12 MMOL/L (ref 3–14)
ANION GAP SERPL CALCULATED.3IONS-SCNC: 18 MMOL/L (ref 3–14)
APPEARANCE UR: ABNORMAL
AST SERPL W P-5'-P-CCNC: 15 U/L (ref 0–45)
AST SERPL W P-5'-P-CCNC: 41 U/L (ref 0–45)
AST SERPL-CCNC: 11 U/L (ref 13–39)
BACTERIA SPEC CULT: NO GROWTH
BACTERIA SPEC CULT: NORMAL
BACTERIA SPEC CULT: NORMAL
BASE EXCESS BLDA CALC-SCNC: 3.8 MMOL/L
BASOPHILS # BLD AUTO: 0 10E9/L (ref 0–0.2)
BASOPHILS NFR BLD AUTO: 0.1 %
BILIRUB SERPL-MCNC: 0.4 MG/DL (ref 0.2–1.3)
BILIRUB SERPL-MCNC: 0.7 MG/DL (ref 0.2–1.3)
BILIRUB UR QL STRIP: NEGATIVE
BLD GP AB SCN SERPL QL: NORMAL
BLD PROD TYP BPU: NORMAL
BLD UNIT ID BPU: 0
BLD UNIT ID BPU: 0
BLOOD BANK CMNT PATIENT-IMP: NORMAL
BLOOD PRODUCT CODE: NORMAL
BLOOD PRODUCT CODE: NORMAL
BPU ID: NORMAL
BPU ID: NORMAL
BUN SERPL-MCNC: 104 MG/DL (ref 7–30)
BUN SERPL-MCNC: 42 MG/DL (ref 7–30)
BUN SERPL-MCNC: 51 MG/DL (ref 7–30)
BUN SERPL-MCNC: 53 MG/DL (ref 7–30)
CALCIUM SERPL-MCNC: 7.6 MG/DL (ref 8.5–10.1)
CALCIUM SERPL-MCNC: 8 MG/DL (ref 8.5–10.1)
CALCIUM SERPL-MCNC: 8.3 MG/DL (ref 8.5–10.1)
CALCIUM SERPL-MCNC: 9 MG/DL (ref 8.5–10.1)
CHLORIDE SERPL-SCNC: 100 MMOL/L (ref 94–109)
CHLORIDE SERPL-SCNC: 100 MMOL/L (ref 94–109)
CHLORIDE SERPL-SCNC: 99 MMOL/L (ref 94–109)
CHLORIDE SERPL-SCNC: 99 MMOL/L (ref 94–109)
CHOLEST SERPL-MCNC: 102 MG/DL
CO2 SERPL-SCNC: 20 MMOL/L (ref 20–32)
CO2 SERPL-SCNC: 25 MMOL/L (ref 20–32)
CO2 SERPL-SCNC: 27 MMOL/L (ref 20–32)
CO2 SERPL-SCNC: 28 MMOL/L (ref 20–32)
COLOR UR AUTO: YELLOW
CORTIS SERPL-MCNC: 39.6 UG/DL (ref 4–22)
CREAT SERPL-MCNC: 3.28 MG/DL (ref 0.66–1.25)
CREAT SERPL-MCNC: 3.7 MG/DL (ref 0.66–1.25)
CREAT SERPL-MCNC: 5.44 MG/DL (ref 0.66–1.25)
CREAT SERPL-MCNC: 6.55 MG/DL (ref 0.6–1.3)
CREAT SERPL-MCNC: 6.88 MG/DL (ref 0.66–1.25)
DIFFERENTIAL METHOD BLD: ABNORMAL
EOSINOPHIL # BLD AUTO: 0 10E9/L (ref 0–0.7)
EOSINOPHIL NFR BLD AUTO: 0.1 %
ERYTHROCYTE [DISTWIDTH] IN BLOOD BY AUTOMATED COUNT: 14.8 % (ref 10–15)
ERYTHROCYTE [DISTWIDTH] IN BLOOD BY AUTOMATED COUNT: 15.1 % (ref 10–15)
ERYTHROCYTE [DISTWIDTH] IN BLOOD BY AUTOMATED COUNT: 18.6 % (ref 10–15)
GFR SERPL CREATININE-BSD FRML MDRD: 10 ML/MIN/1.7M2
GFR SERPL CREATININE-BSD FRML MDRD: 16 ML/MIN/1.7M2
GFR SERPL CREATININE-BSD FRML MDRD: 18 ML/MIN/1.7M2
GFR SERPL CREATININE-BSD FRML MDRD: 8 ML/MIN/1.7M2
GLUCOSE BLDC GLUCOMTR-MCNC: 146 MG/DL (ref 70–99)
GLUCOSE BLDC GLUCOMTR-MCNC: 158 MG/DL (ref 70–99)
GLUCOSE BLDC GLUCOMTR-MCNC: 199 MG/DL (ref 70–99)
GLUCOSE SERPL-MCNC: 131 MG/DL (ref 70–99)
GLUCOSE SERPL-MCNC: 144 MG/DL (ref 70–99)
GLUCOSE SERPL-MCNC: 161 MG/DL (ref 70–99)
GLUCOSE SERPL-MCNC: 230 MG/DL (ref 70–99)
GLUCOSE SERPL-MCNC: 256 MG/DL (ref 90–180)
GLUCOSE SERPL-MCNC: 273 MG/DL (ref 90–180)
GLUCOSE SERPL-MCNC: 317 MG/DL (ref 70–105)
GLUCOSE UR STRIP-MCNC: 300 MG/DL
HBA1C MFR BLD: 6.1 % (ref 4.3–6)
HBA1C MFR BLD: 6.2 % (ref 0–5.6)
HCO3 BLD-SCNC: 28 MMOL/L (ref 21–28)
HCT VFR BLD AUTO: 19 % (ref 40–53)
HCT VFR BLD AUTO: 22.2 % (ref 40–53)
HCT VFR BLD AUTO: 26.8 % (ref 40–53)
HDLC SERPL-MCNC: 43 MG/DL
HEMOCCULT STL QL: POSITIVE
HGB BLD-MCNC: 6.1 G/DL (ref 13.3–17.7)
HGB BLD-MCNC: 7.2 G/DL (ref 13.3–17.7)
HGB BLD-MCNC: 8.3 G/DL (ref 13.3–17.7)
HGB BLD-MCNC: 8.6 G/DL (ref 13.3–17.7)
HGB BLD-MCNC: 8.8 G/DL (ref 13.3–17.7)
HGB BLD-MCNC: 8.9 G/DL (ref 13.3–17.7)
HGB UR QL STRIP: NEGATIVE
IMM GRANULOCYTES # BLD: 0.1 10E9/L (ref 0–0.4)
IMM GRANULOCYTES NFR BLD: 0.6 %
INTERPRETATION ECG - MUSE: NORMAL
INTERPRETATION ECG - MUSE: NORMAL
KETONES UR STRIP-MCNC: NEGATIVE MG/DL
LACTATE BLD-SCNC: 1.1 MMOL/L (ref 0.7–2)
LACTATE BLD-SCNC: 1.8 MMOL/L (ref 0.7–2)
LDLC SERPL CALC-MCNC: 44 MG/DL
LEUKOCYTE ESTERASE UR QL STRIP: NEGATIVE
LIPASE SERPL-CCNC: 301 U/L (ref 73–393)
LYMPHOCYTES # BLD AUTO: 1.2 10E9/L (ref 0.8–5.3)
LYMPHOCYTES NFR BLD AUTO: 7.2 %
Lab: NORMAL
MAGNESIUM SERPL-MCNC: 1.7 MG/DL (ref 1.6–2.3)
MCH RBC QN AUTO: 31.7 PG (ref 26.5–33)
MCH RBC QN AUTO: 33.3 PG (ref 26.5–33)
MCH RBC QN AUTO: 33.5 PG (ref 26.5–33)
MCHC RBC AUTO-ENTMCNC: 32.1 G/DL (ref 31.5–36.5)
MCHC RBC AUTO-ENTMCNC: 32.4 G/DL (ref 31.5–36.5)
MCHC RBC AUTO-ENTMCNC: 32.8 G/DL (ref 31.5–36.5)
MCV RBC AUTO: 103 FL (ref 78–100)
MCV RBC AUTO: 104 FL (ref 78–100)
MCV RBC AUTO: 96 FL (ref 78–100)
MONOCYTES # BLD AUTO: 0.3 10E9/L (ref 0–1.3)
MONOCYTES NFR BLD AUTO: 1.9 %
NEUTROPHILS # BLD AUTO: 15.1 10E9/L (ref 1.6–8.3)
NEUTROPHILS NFR BLD AUTO: 90.1 %
NITRATE UR QL: NEGATIVE
NONHDLC SERPL-MCNC: 59 MG/DL
NRBC # BLD AUTO: 0 10*3/UL
NRBC BLD AUTO-RTO: 0 /100
NT-PROBNP SERPL-MCNC: ABNORMAL PG/ML (ref 0–1800)
NUM BPU REQUESTED: 2
OXYHGB MFR BLD: 98 % (ref 92–100)
PCO2 BLD: 41 MM HG (ref 35–45)
PH BLD: 7.45 PH (ref 7.35–7.45)
PH UR STRIP: 5 PH (ref 5–7)
PHOSPHATE SERPL-MCNC: 4.5 MG/DL (ref 2.5–4.5)
PLATELET # BLD AUTO: 232 10E9/L (ref 150–450)
PLATELET # BLD AUTO: 314 10E9/L (ref 150–450)
PLATELET # BLD AUTO: 380 10E9/L (ref 150–450)
PO2 BLD: 118 MM HG (ref 80–105)
POTASSIUM SERPL-SCNC: 3.7 MMOL/L (ref 3.4–5.3)
POTASSIUM SERPL-SCNC: 3.9 MEQ/L (ref 3.5–5.5)
POTASSIUM SERPL-SCNC: 4.1 MEQ/L (ref 3.5–5.1)
POTASSIUM SERPL-SCNC: 4.1 MMOL/L (ref 3.4–5.3)
POTASSIUM SERPL-SCNC: 4.2 MMOL/L (ref 3.4–5.3)
POTASSIUM SERPL-SCNC: 4.2 MMOL/L (ref 3.4–5.3)
POTASSIUM SERPL-SCNC: 4.7 MEQ/L (ref 3.5–5.5)
PREALB SERPL IA-MCNC: 29 MG/DL (ref 15–45)
PROCALCITONIN SERPL-MCNC: 0.5 NG/ML
PROT SERPL-MCNC: 5.9 G/DL (ref 6.8–8.8)
PROT SERPL-MCNC: 7 G/DL (ref 6.8–8.8)
RBC # BLD AUTO: 1.82 10E12/L (ref 4.4–5.9)
RBC # BLD AUTO: 2.16 10E12/L (ref 4.4–5.9)
RBC # BLD AUTO: 2.78 10E12/L (ref 4.4–5.9)
RBC #/AREA URNS AUTO: 1 /HPF (ref 0–2)
SODIUM SERPL-SCNC: 136 MMOL/L (ref 133–144)
SODIUM SERPL-SCNC: 138 MMOL/L (ref 133–144)
SOURCE: ABNORMAL
SP GR UR STRIP: 1.01 (ref 1–1.03)
SPECIMEN EXP DATE BLD: NORMAL
SPECIMEN SOURCE: NORMAL
TRANSFUSION STATUS PATIENT QL: NORMAL
TRIGL SERPL-MCNC: 75 MG/DL
TROPONIN I SERPL-MCNC: 10.96 UG/L (ref 0–0.04)
TROPONIN I SERPL-MCNC: 27.92 UG/L (ref 0–0.04)
TROPONIN I SERPL-MCNC: 28.17 UG/L (ref 0–0.04)
TROPONIN I SERPL-MCNC: 32.86 UG/L (ref 0–0.04)
TROPONIN I SERPL-MCNC: 5.99 UG/L (ref 0–0.04)
TSH SERPL DL<=0.005 MIU/L-ACNC: 0.91 MU/L (ref 0.4–4)
URATE SERPL-MCNC: 4.5 MG/DL (ref 3.5–7.2)
UROBILINOGEN UR STRIP-MCNC: NORMAL MG/DL (ref 0–2)
VANCOMYCIN SERPL-MCNC: 11.1 MG/L
WBC # BLD AUTO: 15.1 10E9/L (ref 4–11)
WBC # BLD AUTO: 16.8 10E9/L (ref 4–11)
WBC # BLD AUTO: 26.1 10E9/L (ref 4–11)
WBC #/AREA URNS AUTO: <1 /HPF (ref 0–5)

## 2018-01-01 PROCEDURE — 99239 HOSP IP/OBS DSCHRG MGMT >30: CPT | Performed by: INTERNAL MEDICINE

## 2018-01-01 PROCEDURE — 12000000 ZZH R&B MED SURG/OB

## 2018-01-01 PROCEDURE — 93000 ELECTROCARDIOGRAM COMPLETE: CPT | Performed by: INTERNAL MEDICINE

## 2018-01-01 PROCEDURE — 96368 THER/DIAG CONCURRENT INF: CPT

## 2018-01-01 PROCEDURE — 80053 COMPREHEN METABOLIC PANEL: CPT | Performed by: INTERNAL MEDICINE

## 2018-01-01 PROCEDURE — 84484 ASSAY OF TROPONIN QUANT: CPT | Performed by: SURGERY

## 2018-01-01 PROCEDURE — 25000125 ZZHC RX 250: Performed by: NURSE PRACTITIONER

## 2018-01-01 PROCEDURE — 86901 BLOOD TYPING SEROLOGIC RH(D): CPT | Performed by: EMERGENCY MEDICINE

## 2018-01-01 PROCEDURE — 36415 COLL VENOUS BLD VENIPUNCTURE: CPT | Performed by: INTERNAL MEDICINE

## 2018-01-01 PROCEDURE — 93005 ELECTROCARDIOGRAM TRACING: CPT

## 2018-01-01 PROCEDURE — 96365 THER/PROPH/DIAG IV INF INIT: CPT

## 2018-01-01 PROCEDURE — 86900 BLOOD TYPING SEROLOGIC ABO: CPT | Performed by: EMERGENCY MEDICINE

## 2018-01-01 PROCEDURE — 25000128 H RX IP 250 OP 636: Performed by: OBSTETRICS & GYNECOLOGY

## 2018-01-01 PROCEDURE — 93010 ELECTROCARDIOGRAM REPORT: CPT | Performed by: INTERNAL MEDICINE

## 2018-01-01 PROCEDURE — 83036 HEMOGLOBIN GLYCOSYLATED A1C: CPT | Performed by: INTERNAL MEDICINE

## 2018-01-01 PROCEDURE — 90937 HEMODIALYSIS REPEATED EVAL: CPT

## 2018-01-01 PROCEDURE — 40000281 ZZH STATISTIC TRANSPORT TIME EA 15 MIN

## 2018-01-01 PROCEDURE — 40000008 ZZH STATISTIC AIRWAY CARE

## 2018-01-01 PROCEDURE — 85025 COMPLETE CBC W/AUTO DIFF WBC: CPT | Performed by: EMERGENCY MEDICINE

## 2018-01-01 PROCEDURE — 83605 ASSAY OF LACTIC ACID: CPT | Performed by: EMERGENCY MEDICINE

## 2018-01-01 PROCEDURE — 25000132 ZZH RX MED GY IP 250 OP 250 PS 637: Mod: GY | Performed by: NURSE PRACTITIONER

## 2018-01-01 PROCEDURE — 40000257 ZZH STATISTIC CONSULT NO CHARGE VASC ACCESS

## 2018-01-01 PROCEDURE — 99291 CRITICAL CARE FIRST HOUR: CPT | Mod: 25

## 2018-01-01 PROCEDURE — 40000986 XR CHEST PORT 1 VW

## 2018-01-01 PROCEDURE — 25000125 ZZHC RX 250: Performed by: INTERNAL MEDICINE

## 2018-01-01 PROCEDURE — 86850 RBC ANTIBODY SCREEN: CPT | Performed by: EMERGENCY MEDICINE

## 2018-01-01 PROCEDURE — 84484 ASSAY OF TROPONIN QUANT: CPT | Performed by: EMERGENCY MEDICINE

## 2018-01-01 PROCEDURE — 25000128 H RX IP 250 OP 636: Performed by: SURGERY

## 2018-01-01 PROCEDURE — 87086 URINE CULTURE/COLONY COUNT: CPT | Performed by: EMERGENCY MEDICINE

## 2018-01-01 PROCEDURE — 99214 OFFICE O/P EST MOD 30 MIN: CPT | Performed by: INTERNAL MEDICINE

## 2018-01-01 PROCEDURE — 82533 TOTAL CORTISOL: CPT | Performed by: EMERGENCY MEDICINE

## 2018-01-01 PROCEDURE — 80061 LIPID PANEL: CPT | Performed by: INTERNAL MEDICINE

## 2018-01-01 PROCEDURE — 70450 CT HEAD/BRAIN W/O DYE: CPT

## 2018-01-01 PROCEDURE — 99221 1ST HOSP IP/OBS SF/LOW 40: CPT | Performed by: NURSE PRACTITIONER

## 2018-01-01 PROCEDURE — 40000264 ECHO COMPLETE WITH OPTISON

## 2018-01-01 PROCEDURE — 99291 CRITICAL CARE FIRST HOUR: CPT | Performed by: INTERNAL MEDICINE

## 2018-01-01 PROCEDURE — A9270 NON-COVERED ITEM OR SERVICE: HCPCS | Mod: GY | Performed by: INTERNAL MEDICINE

## 2018-01-01 PROCEDURE — 80048 BASIC METABOLIC PNL TOTAL CA: CPT | Performed by: INTERNAL MEDICINE

## 2018-01-01 PROCEDURE — 99291 CRITICAL CARE FIRST HOUR: CPT | Mod: 25 | Performed by: INTERNAL MEDICINE

## 2018-01-01 PROCEDURE — 25000128 H RX IP 250 OP 636: Performed by: INTERNAL MEDICINE

## 2018-01-01 PROCEDURE — 82272 OCCULT BLD FECES 1-3 TESTS: CPT | Performed by: EMERGENCY MEDICINE

## 2018-01-01 PROCEDURE — P9016 RBC LEUKOCYTES REDUCED: HCPCS | Performed by: EMERGENCY MEDICINE

## 2018-01-01 PROCEDURE — 25000132 ZZH RX MED GY IP 250 OP 250 PS 637: Mod: GY | Performed by: INTERNAL MEDICINE

## 2018-01-01 PROCEDURE — 40000275 ZZH STATISTIC RCP TIME EA 10 MIN

## 2018-01-01 PROCEDURE — 20000003 ZZH R&B ICU

## 2018-01-01 PROCEDURE — 99223 1ST HOSP IP/OBS HIGH 75: CPT | Mod: 25 | Performed by: INTERNAL MEDICINE

## 2018-01-01 PROCEDURE — 36556 INSERT NON-TUNNEL CV CATH: CPT | Mod: GC | Performed by: INTERNAL MEDICINE

## 2018-01-01 PROCEDURE — 85027 COMPLETE CBC AUTOMATED: CPT | Performed by: INTERNAL MEDICINE

## 2018-01-01 PROCEDURE — 31500 INSERT EMERGENCY AIRWAY: CPT

## 2018-01-01 PROCEDURE — 83880 ASSAY OF NATRIURETIC PEPTIDE: CPT | Performed by: EMERGENCY MEDICINE

## 2018-01-01 PROCEDURE — 71250 CT THORAX DX C-: CPT

## 2018-01-01 PROCEDURE — 83735 ASSAY OF MAGNESIUM: CPT | Performed by: INTERNAL MEDICINE

## 2018-01-01 PROCEDURE — P9041 ALBUMIN (HUMAN),5%, 50ML: HCPCS | Performed by: OBSTETRICS & GYNECOLOGY

## 2018-01-01 PROCEDURE — 94002 VENT MGMT INPAT INIT DAY: CPT

## 2018-01-01 PROCEDURE — 99232 SBSQ HOSP IP/OBS MODERATE 35: CPT | Performed by: INTERNAL MEDICINE

## 2018-01-01 PROCEDURE — 84100 ASSAY OF PHOSPHORUS: CPT | Performed by: INTERNAL MEDICINE

## 2018-01-01 PROCEDURE — 80053 COMPREHEN METABOLIC PANEL: CPT | Performed by: EMERGENCY MEDICINE

## 2018-01-01 PROCEDURE — 86923 COMPATIBILITY TEST ELECTRIC: CPT | Performed by: EMERGENCY MEDICINE

## 2018-01-01 PROCEDURE — 99207 ZZC MOONLIGHTING INDICATOR: CPT | Performed by: INTERNAL MEDICINE

## 2018-01-01 PROCEDURE — 84134 ASSAY OF PREALBUMIN: CPT | Performed by: INTERNAL MEDICINE

## 2018-01-01 PROCEDURE — 83605 ASSAY OF LACTIC ACID: CPT | Performed by: INTERNAL MEDICINE

## 2018-01-01 PROCEDURE — 00000146 ZZHCL STATISTIC GLUCOSE BY METER IP

## 2018-01-01 PROCEDURE — 99207 C FOOT EXAM  NO CHARGE: CPT | Performed by: INTERNAL MEDICINE

## 2018-01-01 PROCEDURE — 99292 CRITICAL CARE ADDL 30 MIN: CPT

## 2018-01-01 PROCEDURE — 93990 DOPPLER FLOW TESTING: CPT

## 2018-01-01 PROCEDURE — 93306 TTE W/DOPPLER COMPLETE: CPT | Mod: 26 | Performed by: INTERNAL MEDICINE

## 2018-01-01 PROCEDURE — 51702 INSERT TEMP BLADDER CATH: CPT

## 2018-01-01 PROCEDURE — 87040 BLOOD CULTURE FOR BACTERIA: CPT | Performed by: EMERGENCY MEDICINE

## 2018-01-01 PROCEDURE — 85049 AUTOMATED PLATELET COUNT: CPT | Performed by: INTERNAL MEDICINE

## 2018-01-01 PROCEDURE — 84443 ASSAY THYROID STIM HORMONE: CPT | Performed by: EMERGENCY MEDICINE

## 2018-01-01 PROCEDURE — 36600 WITHDRAWAL OF ARTERIAL BLOOD: CPT

## 2018-01-01 PROCEDURE — 85018 HEMOGLOBIN: CPT | Performed by: SURGERY

## 2018-01-01 PROCEDURE — 87040 BLOOD CULTURE FOR BACTERIA: CPT | Performed by: INTERNAL MEDICINE

## 2018-01-01 PROCEDURE — 80202 ASSAY OF VANCOMYCIN: CPT | Performed by: SURGERY

## 2018-01-01 PROCEDURE — 84484 ASSAY OF TROPONIN QUANT: CPT | Performed by: INTERNAL MEDICINE

## 2018-01-01 PROCEDURE — 25000128 H RX IP 250 OP 636: Performed by: EMERGENCY MEDICINE

## 2018-01-01 PROCEDURE — 96375 TX/PRO/DX INJ NEW DRUG ADDON: CPT

## 2018-01-01 PROCEDURE — 5A1945Z RESPIRATORY VENTILATION, 24-96 CONSECUTIVE HOURS: ICD-10-PCS | Performed by: INTERNAL MEDICINE

## 2018-01-01 PROCEDURE — 63400005 ZZH RX 634: Performed by: INTERNAL MEDICINE

## 2018-01-01 PROCEDURE — 83690 ASSAY OF LIPASE: CPT | Performed by: INTERNAL MEDICINE

## 2018-01-01 PROCEDURE — 99213 OFFICE O/P EST LOW 20 MIN: CPT | Performed by: INTERNAL MEDICINE

## 2018-01-01 PROCEDURE — 81001 URINALYSIS AUTO W/SCOPE: CPT | Performed by: EMERGENCY MEDICINE

## 2018-01-01 PROCEDURE — 85018 HEMOGLOBIN: CPT | Performed by: INTERNAL MEDICINE

## 2018-01-01 PROCEDURE — 25000128 H RX IP 250 OP 636

## 2018-01-01 PROCEDURE — 25500064 ZZH RX 255 OP 636: Performed by: INTERNAL MEDICINE

## 2018-01-01 PROCEDURE — 84550 ASSAY OF BLOOD/URIC ACID: CPT | Performed by: INTERNAL MEDICINE

## 2018-01-01 PROCEDURE — 99233 SBSQ HOSP IP/OBS HIGH 50: CPT | Performed by: INTERNAL MEDICINE

## 2018-01-01 PROCEDURE — 84145 PROCALCITONIN (PCT): CPT | Performed by: INTERNAL MEDICINE

## 2018-01-01 PROCEDURE — 82805 BLOOD GASES W/O2 SATURATION: CPT | Performed by: SURGERY

## 2018-01-01 RX ORDER — MAGNESIUM SULFATE HEPTAHYDRATE 40 MG/ML
2 INJECTION, SOLUTION INTRAVENOUS ONCE
Status: COMPLETED | OUTPATIENT
Start: 2018-01-01 | End: 2018-01-01

## 2018-01-01 RX ORDER — FUROSEMIDE 10 MG/ML
80 INJECTION INTRAMUSCULAR; INTRAVENOUS ONCE
Status: COMPLETED | OUTPATIENT
Start: 2018-01-01 | End: 2018-01-01

## 2018-01-01 RX ORDER — ACETAMINOPHEN 325 MG/1
650 TABLET ORAL EVERY 6 HOURS PRN
Status: DISCONTINUED | OUTPATIENT
Start: 2018-01-01 | End: 2018-01-01 | Stop reason: HOSPADM

## 2018-01-01 RX ORDER — CEFEPIME HYDROCHLORIDE 1 G/1
1 INJECTION, POWDER, FOR SOLUTION INTRAMUSCULAR; INTRAVENOUS EVERY 24 HOURS
Status: DISCONTINUED | OUTPATIENT
Start: 2018-01-01 | End: 2018-01-01

## 2018-01-01 RX ORDER — FENTANYL CITRATE 50 UG/ML
50 INJECTION, SOLUTION INTRAMUSCULAR; INTRAVENOUS
Status: DISCONTINUED | OUTPATIENT
Start: 2018-01-01 | End: 2018-01-01

## 2018-01-01 RX ORDER — ACETAMINOPHEN 650 MG/1
650 SUPPOSITORY RECTAL EVERY 6 HOURS PRN
Status: DISCONTINUED | OUTPATIENT
Start: 2018-01-01 | End: 2018-01-01 | Stop reason: HOSPADM

## 2018-01-01 RX ORDER — HYDROMORPHONE HYDROCHLORIDE 10 MG/ML
4 INJECTION INTRAMUSCULAR; INTRAVENOUS; SUBCUTANEOUS
Status: DISCONTINUED | OUTPATIENT
Start: 2018-01-01 | End: 2018-01-01 | Stop reason: HOSPADM

## 2018-01-01 RX ORDER — GLYCOPYRROLATE 0.2 MG/ML
.2-.4 INJECTION, SOLUTION INTRAMUSCULAR; INTRAVENOUS EVERY 4 HOURS PRN
Status: DISCONTINUED | OUTPATIENT
Start: 2018-01-01 | End: 2018-01-01 | Stop reason: HOSPADM

## 2018-01-01 RX ORDER — ALLOPURINOL 100 MG/1
TABLET ORAL
Qty: 1 TABLET | Refills: 0 | OUTPATIENT
Start: 2018-01-01

## 2018-01-01 RX ORDER — CEFEPIME HYDROCHLORIDE 1 G/1
1 INJECTION, POWDER, FOR SOLUTION INTRAMUSCULAR; INTRAVENOUS ONCE
Status: DISCONTINUED | OUTPATIENT
Start: 2018-01-01 | End: 2018-01-01

## 2018-01-01 RX ORDER — HALOPERIDOL 2 MG/ML
1 SOLUTION ORAL EVERY 6 HOURS PRN
Qty: 60 ML | Refills: 0 | Status: SHIPPED | OUTPATIENT
Start: 2018-01-01

## 2018-01-01 RX ORDER — PROPOFOL 10 MG/ML
INJECTION, EMULSION INTRAVENOUS
Status: COMPLETED
Start: 2018-01-01 | End: 2018-01-01

## 2018-01-01 RX ORDER — SODIUM CHLORIDE 9 MG/ML
INJECTION, SOLUTION INTRAVENOUS CONTINUOUS
Status: DISCONTINUED | OUTPATIENT
Start: 2018-01-01 | End: 2018-01-01

## 2018-01-01 RX ORDER — AMLODIPINE BESYLATE 10 MG/1
10 TABLET ORAL DAILY
Qty: 90 TABLET | Refills: 3 | Status: ON HOLD | OUTPATIENT
Start: 2018-01-01 | End: 2018-01-01

## 2018-01-01 RX ORDER — ATROPINE SULFATE 10 MG/ML
1-2 SOLUTION/ DROPS OPHTHALMIC
Status: DISCONTINUED | OUTPATIENT
Start: 2018-01-01 | End: 2018-01-01 | Stop reason: HOSPADM

## 2018-01-01 RX ORDER — FOLIC AC/VIT BCOMP,C/ZN/VIT D3 0.8MG-2
1 TABLET ORAL DAILY
Refills: 11 | Status: ON HOLD | COMMUNITY
Start: 2018-01-01 | End: 2018-01-01

## 2018-01-01 RX ORDER — DONEPEZIL HYDROCHLORIDE 5 MG/1
1 TABLET, FILM COATED ORAL DAILY
Refills: 5 | Status: ON HOLD | COMMUNITY
Start: 2018-01-01 | End: 2018-01-01

## 2018-01-01 RX ORDER — NOREPINEPHRINE BITARTRATE/D5W 16MG/250ML
0.03-0.4 PLASTIC BAG, INJECTION (ML) INTRAVENOUS CONTINUOUS
Status: DISCONTINUED | OUTPATIENT
Start: 2018-01-01 | End: 2018-01-01

## 2018-01-01 RX ORDER — GLYCOPYRROLATE 0.2 MG/ML
0.2 INJECTION, SOLUTION INTRAMUSCULAR; INTRAVENOUS ONCE
Status: DISCONTINUED | OUTPATIENT
Start: 2018-01-01 | End: 2018-01-01

## 2018-01-01 RX ORDER — LORAZEPAM 2 MG/ML
2 CONCENTRATE ORAL EVERY 4 HOURS PRN
Qty: 15 ML | Refills: 0 | Status: SHIPPED | OUTPATIENT
Start: 2018-01-01

## 2018-01-01 RX ORDER — PROPOFOL 10 MG/ML
5-75 INJECTION, EMULSION INTRAVENOUS CONTINUOUS
Status: DISCONTINUED | OUTPATIENT
Start: 2018-01-01 | End: 2018-01-01

## 2018-01-01 RX ORDER — ATROPINE SULFATE 10 MG/ML
1-2 SOLUTION/ DROPS OPHTHALMIC
Qty: 1 BOTTLE | Refills: 0 | Status: SHIPPED | OUTPATIENT
Start: 2018-01-01

## 2018-01-01 RX ORDER — AMOXICILLIN 250 MG
1 CAPSULE ORAL 2 TIMES DAILY PRN
Status: DISCONTINUED | OUTPATIENT
Start: 2018-01-01 | End: 2018-01-01 | Stop reason: HOSPADM

## 2018-01-01 RX ORDER — LORAZEPAM 2 MG/ML
2 CONCENTRATE ORAL
Status: DISCONTINUED | OUTPATIENT
Start: 2018-01-01 | End: 2018-01-01 | Stop reason: ALTCHOICE

## 2018-01-01 RX ORDER — HYDRALAZINE HYDROCHLORIDE 100 MG/1
TABLET, FILM COATED ORAL
Qty: 270 TABLET | Refills: 3 | Status: ON HOLD | OUTPATIENT
Start: 2018-01-01 | End: 2018-01-01

## 2018-01-01 RX ORDER — LORAZEPAM 2 MG/ML
2 INJECTION INTRAMUSCULAR EVERY 5 MIN PRN
Status: DISCONTINUED | OUTPATIENT
Start: 2018-01-01 | End: 2018-01-01

## 2018-01-01 RX ORDER — BISACODYL 10 MG
10 SUPPOSITORY, RECTAL RECTAL
Qty: 12 SUPPOSITORY | Refills: 0 | Status: SHIPPED | OUTPATIENT
Start: 2018-08-16

## 2018-01-01 RX ORDER — FLURBIPROFEN SODIUM 0.3 MG/ML
1 SOLUTION/ DROPS OPHTHALMIC EVERY 8 HOURS
Refills: 0 | Status: ON HOLD | COMMUNITY
Start: 2018-01-01 | End: 2018-01-01

## 2018-01-01 RX ORDER — AMOXICILLIN 250 MG
1 CAPSULE ORAL 2 TIMES DAILY
Status: DISCONTINUED | OUTPATIENT
Start: 2018-01-01 | End: 2018-01-01

## 2018-01-01 RX ORDER — ALBUMIN, HUMAN INJ 5% 5 %
25 SOLUTION INTRAVENOUS ONCE
Status: COMPLETED | OUTPATIENT
Start: 2018-01-01 | End: 2018-01-01

## 2018-01-01 RX ORDER — BISACODYL 10 MG
10 SUPPOSITORY, RECTAL RECTAL
Status: DISCONTINUED | OUTPATIENT
Start: 2018-08-16 | End: 2018-01-01 | Stop reason: HOSPADM

## 2018-01-01 RX ORDER — CHLORHEXIDINE GLUCONATE ORAL RINSE 1.2 MG/ML
15 SOLUTION DENTAL 2 TIMES DAILY
Status: DISCONTINUED | OUTPATIENT
Start: 2018-01-01 | End: 2018-01-01

## 2018-01-01 RX ORDER — NALOXONE HYDROCHLORIDE 0.4 MG/ML
.1-.4 INJECTION, SOLUTION INTRAMUSCULAR; INTRAVENOUS; SUBCUTANEOUS
Status: DISCONTINUED | OUTPATIENT
Start: 2018-01-01 | End: 2018-01-01

## 2018-01-01 RX ORDER — HEPARIN SODIUM 5000 [USP'U]/.5ML
5000 INJECTION, SOLUTION INTRAVENOUS; SUBCUTANEOUS EVERY 8 HOURS
Status: DISCONTINUED | OUTPATIENT
Start: 2018-01-01 | End: 2018-01-01

## 2018-01-01 RX ORDER — ALLOPURINOL 100 MG/1
100 TABLET ORAL DAILY
Qty: 90 TABLET | Refills: 3 | Status: ON HOLD | OUTPATIENT
Start: 2018-01-01 | End: 2018-01-01

## 2018-01-01 RX ORDER — BISACODYL 10 MG
10 SUPPOSITORY, RECTAL RECTAL DAILY PRN
Status: DISCONTINUED | OUTPATIENT
Start: 2018-01-01 | End: 2018-01-01

## 2018-01-01 RX ORDER — FUROSEMIDE 80 MG
80 TABLET ORAL DAILY
Qty: 60 TABLET | Refills: 3 | Status: ON HOLD | OUTPATIENT
Start: 2018-01-01 | End: 2018-01-01

## 2018-01-01 RX ORDER — SOD CHLORD/LANOLIN/MIN.OIL/PET
LOTION (ML) TOPICAL 3 TIMES DAILY
Status: DISCONTINUED | OUTPATIENT
Start: 2018-01-01 | End: 2018-01-01

## 2018-01-01 RX ORDER — HYDROMORPHONE HYDROCHLORIDE 10 MG/ML
4 INJECTION INTRAMUSCULAR; INTRAVENOUS; SUBCUTANEOUS
Qty: 30 ML | Refills: 0 | Status: SHIPPED | OUTPATIENT
Start: 2018-01-01

## 2018-01-01 RX ORDER — ACETAMINOPHEN 650 MG/1
650 SUPPOSITORY RECTAL EVERY 6 HOURS PRN
Qty: 60 SUPPOSITORY | Refills: 0 | Status: SHIPPED | OUTPATIENT
Start: 2018-01-01

## 2018-01-01 RX ORDER — SOD CHLORD/LANOLIN/MIN.OIL/PET
LOTION (ML) TOPICAL 3 TIMES DAILY PRN
Status: DISCONTINUED | OUTPATIENT
Start: 2018-01-01 | End: 2018-01-01 | Stop reason: HOSPADM

## 2018-01-01 RX ORDER — SIMVASTATIN 40 MG
TABLET ORAL
Qty: 90 TABLET | Refills: 0 | OUTPATIENT
Start: 2018-01-01

## 2018-01-01 RX ORDER — HALOPERIDOL 2 MG/ML
1-2 SOLUTION ORAL EVERY 6 HOURS PRN
Status: DISCONTINUED | OUTPATIENT
Start: 2018-01-01 | End: 2018-01-01 | Stop reason: HOSPADM

## 2018-01-01 RX ORDER — HYDRALAZINE HYDROCHLORIDE 100 MG/1
TABLET, FILM COATED ORAL
Qty: 270 TABLET | Refills: 0 | Status: SHIPPED | OUTPATIENT
Start: 2018-01-01 | End: 2018-01-01

## 2018-01-01 RX ADMIN — FENTANYL CITRATE 50 MCG: 50 INJECTION INTRAMUSCULAR; INTRAVENOUS at 11:07

## 2018-01-01 RX ADMIN — LORAZEPAM 2 MG: 2 INJECTION INTRAMUSCULAR; INTRAVENOUS at 09:33

## 2018-01-01 RX ADMIN — FENTANYL CITRATE 50 MCG: 50 INJECTION INTRAMUSCULAR; INTRAVENOUS at 13:54

## 2018-01-01 RX ADMIN — Medication 4 MG: at 12:46

## 2018-01-01 RX ADMIN — SODIUM CHLORIDE, PRESERVATIVE FREE: 5 INJECTION INTRAVENOUS at 16:55

## 2018-01-01 RX ADMIN — Medication 4 MG: at 15:38

## 2018-01-01 RX ADMIN — Medication 2 MG: at 16:53

## 2018-01-01 RX ADMIN — SODIUM CHLORIDE 250 ML: 9 INJECTION, SOLUTION INTRAVENOUS at 18:44

## 2018-01-01 RX ADMIN — FENTANYL CITRATE 50 MCG: 50 INJECTION INTRAMUSCULAR; INTRAVENOUS at 19:34

## 2018-01-01 RX ADMIN — FENTANYL CITRATE 50 MCG: 50 INJECTION INTRAMUSCULAR; INTRAVENOUS at 20:31

## 2018-01-01 RX ADMIN — Medication 4 MG: at 08:53

## 2018-01-01 RX ADMIN — VASOPRESSIN 1.2 UNITS/HR: 20 INJECTION INTRAVENOUS at 06:30

## 2018-01-01 RX ADMIN — PROPOFOL 20 MCG/KG/MIN: 10 INJECTION, EMULSION INTRAVENOUS at 11:20

## 2018-01-01 RX ADMIN — FENTANYL CITRATE 50 MCG: 50 INJECTION INTRAMUSCULAR; INTRAVENOUS at 23:13

## 2018-01-01 RX ADMIN — FENTANYL CITRATE 50 MCG: 50 INJECTION INTRAMUSCULAR; INTRAVENOUS at 22:38

## 2018-01-01 RX ADMIN — FENTANYL CITRATE 50 MCG: 50 INJECTION INTRAMUSCULAR; INTRAVENOUS at 16:59

## 2018-01-01 RX ADMIN — FENTANYL CITRATE 50 MCG: 50 INJECTION INTRAMUSCULAR; INTRAVENOUS at 09:18

## 2018-01-01 RX ADMIN — FENTANYL CITRATE 50 MCG: 50 INJECTION INTRAMUSCULAR; INTRAVENOUS at 18:48

## 2018-01-01 RX ADMIN — ALBUMIN (HUMAN) 25 G: 12.5 SOLUTION INTRAVENOUS at 19:53

## 2018-01-01 RX ADMIN — VANCOMYCIN HYDROCHLORIDE 1500 MG: 5 INJECTION, POWDER, LYOPHILIZED, FOR SOLUTION INTRAVENOUS at 17:06

## 2018-01-01 RX ADMIN — EPOETIN ALFA 6000 UNITS: 3000 SOLUTION INTRAVENOUS; SUBCUTANEOUS at 18:44

## 2018-01-01 RX ADMIN — GLYCOPYRROLATE 0.4 MG: 0.2 INJECTION, SOLUTION INTRAMUSCULAR; INTRAVENOUS at 16:58

## 2018-01-01 RX ADMIN — FENTANYL CITRATE 50 MCG: 50 INJECTION INTRAMUSCULAR; INTRAVENOUS at 18:24

## 2018-01-01 RX ADMIN — FENTANYL CITRATE 50 MCG: 50 INJECTION INTRAMUSCULAR; INTRAVENOUS at 01:07

## 2018-01-01 RX ADMIN — HUMAN ALBUMIN MICROSPHERES AND PERFLUTREN 5 ML: 10; .22 INJECTION, SOLUTION INTRAVENOUS at 05:44

## 2018-01-01 RX ADMIN — CHLORHEXIDINE GLUCONATE 15 ML: 1.2 RINSE ORAL at 20:11

## 2018-01-01 RX ADMIN — FENTANYL CITRATE 50 MCG: 50 INJECTION INTRAMUSCULAR; INTRAVENOUS at 12:50

## 2018-01-01 RX ADMIN — PROPOFOL 5 MCG/KG/MIN: 10 INJECTION, EMULSION INTRAVENOUS at 16:26

## 2018-01-01 RX ADMIN — FENTANYL CITRATE 50 MCG: 50 INJECTION INTRAMUSCULAR; INTRAVENOUS at 15:29

## 2018-01-01 RX ADMIN — LORAZEPAM 2 MG: 2 INJECTION INTRAMUSCULAR; INTRAVENOUS at 20:31

## 2018-01-01 RX ADMIN — CHLORHEXIDINE GLUCONATE 15 ML: 1.2 RINSE ORAL at 08:38

## 2018-01-01 RX ADMIN — CEFEPIME HYDROCHLORIDE 1 G: 1 INJECTION, POWDER, FOR SOLUTION INTRAMUSCULAR; INTRAVENOUS at 16:53

## 2018-01-01 RX ADMIN — ATROPINE SULFATE 1 DROP: 10 SOLUTION/ DROPS OPHTHALMIC at 12:10

## 2018-01-01 RX ADMIN — FUROSEMIDE 80 MG: 10 INJECTION, SOLUTION INTRAVENOUS at 15:56

## 2018-01-01 RX ADMIN — SODIUM CHLORIDE, PRESERVATIVE FREE: 5 INJECTION INTRAVENOUS at 05:00

## 2018-01-01 RX ADMIN — PROPOFOL 20 MG: 10 INJECTION, EMULSION INTRAVENOUS at 16:50

## 2018-01-01 RX ADMIN — FENTANYL CITRATE 50 MCG: 50 INJECTION INTRAMUSCULAR; INTRAVENOUS at 05:18

## 2018-01-01 RX ADMIN — LORAZEPAM 2 MG: 2 INJECTION INTRAMUSCULAR; INTRAVENOUS at 07:38

## 2018-01-01 RX ADMIN — SODIUM CHLORIDE 300 ML: 9 INJECTION, SOLUTION INTRAVENOUS at 18:44

## 2018-01-01 RX ADMIN — MAGNESIUM SULFATE HEPTAHYDRATE 2 G: 40 INJECTION, SOLUTION INTRAVENOUS at 08:25

## 2018-01-01 RX ADMIN — GLYCOPYRROLATE 0.2 MG: 0.2 INJECTION, SOLUTION INTRAMUSCULAR; INTRAVENOUS at 12:43

## 2018-01-01 RX ADMIN — GLYCOPYRROLATE 0.2 MG: 0.2 INJECTION, SOLUTION INTRAMUSCULAR; INTRAVENOUS at 12:10

## 2018-01-01 RX ADMIN — FENTANYL CITRATE 50 MCG: 50 INJECTION INTRAMUSCULAR; INTRAVENOUS at 06:18

## 2018-01-01 RX ADMIN — Medication 2 MG: at 05:26

## 2018-01-01 RX ADMIN — FENTANYL CITRATE 50 MCG: 50 INJECTION INTRAMUSCULAR; INTRAVENOUS at 12:14

## 2018-01-01 RX ADMIN — VANCOMYCIN HYDROCHLORIDE 1500 MG: 5 INJECTION, POWDER, LYOPHILIZED, FOR SOLUTION INTRAVENOUS at 10:02

## 2018-01-01 RX ADMIN — PANTOPRAZOLE SODIUM 40 MG: 40 INJECTION, POWDER, FOR SOLUTION INTRAVENOUS at 08:22

## 2018-01-01 RX ADMIN — FENTANYL CITRATE 50 MCG: 50 INJECTION INTRAMUSCULAR; INTRAVENOUS at 00:43

## 2018-01-01 RX ADMIN — CEFEPIME HYDROCHLORIDE 2 G: 2 INJECTION, POWDER, FOR SOLUTION INTRAVENOUS at 16:31

## 2018-01-01 RX ADMIN — SODIUM CHLORIDE, PRESERVATIVE FREE: 5 INJECTION INTRAVENOUS at 06:00

## 2018-01-01 RX ADMIN — FENTANYL CITRATE 50 MCG: 50 INJECTION INTRAMUSCULAR; INTRAVENOUS at 08:13

## 2018-01-01 RX ADMIN — FENTANYL CITRATE 50 MCG: 50 INJECTION INTRAMUSCULAR; INTRAVENOUS at 20:23

## 2018-01-01 RX ADMIN — Medication 4 MG: at 13:14

## 2018-01-01 RX ADMIN — Medication 4 MG: at 04:33

## 2018-01-01 RX ADMIN — Medication 1 DROP: at 15:29

## 2018-01-01 RX ADMIN — PANTOPRAZOLE SODIUM 40 MG: 40 INJECTION, POWDER, FOR SOLUTION INTRAVENOUS at 22:36

## 2018-01-01 ASSESSMENT — ACTIVITIES OF DAILY LIVING (ADL)
ADLS_ACUITY_SCORE: 18
ADLS_ACUITY_SCORE: 19
ADLS_ACUITY_SCORE: 18
ADLS_ACUITY_SCORE: 21
ADLS_ACUITY_SCORE: 18
ADLS_ACUITY_SCORE: 19
ADLS_ACUITY_SCORE: 17
ADLS_ACUITY_SCORE: 19
ADLS_ACUITY_SCORE: 16
ADLS_ACUITY_SCORE: 19
ADLS_ACUITY_SCORE: 19
ADLS_ACUITY_SCORE: 18
ADLS_ACUITY_SCORE: 19
ADLS_ACUITY_SCORE: 21
ADLS_ACUITY_SCORE: 19
ADLS_ACUITY_SCORE: 18
ADLS_ACUITY_SCORE: 19
ADLS_ACUITY_SCORE: 17
ADLS_ACUITY_SCORE: 19

## 2018-01-01 ASSESSMENT — PAIN DESCRIPTION - DESCRIPTORS
DESCRIPTORS: ACHING;CONSTANT;DULL
DESCRIPTORS: DISCOMFORT
DESCRIPTORS: DULL;ACHING

## 2018-02-08 NOTE — TELEPHONE ENCOUNTER
Requested Prescriptions   Pending Prescriptions Disp Refills     hydrALAZINE (APRESOLINE) 100 MG TABS tablet [Pharmacy Med Name: HYDRALAZINE 100MG (HUNDRED MG) TABS] 270 tablet 0     Sig: TAKE 1 TABLET(100 MG) BY MOUTH THREE TIMES DAILY    There is no refill protocol information for this order        Routing refill request to provider for review/approval because:  Drug not on the [facility] refill protocol     Patient was to be seen in august/ sept of 2017 and did not    Ilda MELGRA RN, BSN, PHN  Zearing Flex RN

## 2018-02-08 NOTE — TELEPHONE ENCOUNTER
Requested Prescriptions   Pending Prescriptions Disp Refills     hydrALAZINE (APRESOLINE) 100 MG TABS tablet [Pharmacy Med Name: HYDRALAZINE 100MG (HUNDRED MG) TABS] 270 tablet 0     Sig: TAKE 1 TABLET(100 MG) BY MOUTH THREE TIMES DAILY    Last Written Prescription Date:  5/18/17  Last Fill Quantity: 270,  # refills: 1   Last Office Visit with [facility] provider:  5/18/2017     Future Office Visit:       There is no refill protocol information for this order

## 2018-02-13 NOTE — TELEPHONE ENCOUNTER
Routing refill request to provider for review/approval because:  Labs out of range:  CR- elevated    Ilda MELGAR RN, BSN, PHN  Henrry Grande RN

## 2018-02-13 NOTE — TELEPHONE ENCOUNTER
"Requested Prescriptions   Pending Prescriptions Disp Refills     allopurinol (ZYLOPRIM) 100 MG tablet [Pharmacy Med Name: ALLOPURINOL 100MG TABLETS]  Last Written Prescription Date:  1/18/2017  Last Fill Quantity: 90 tablet,  # refills: 3   Last office visit: 5/18/2017 with prescribing provider:  Shaila Silverman   Future Office Visit:     90 tablet 0     Sig: TAKE 1 TABLET(100 MG) BY MOUTH DAILY    Gout Agents Protocol Failed    2/12/2018  4:37 PM       Failed - CBC on file in past 12 months    Recent Labs   Lab Test  05/18/17   1112  11/16/16   0725   WBC   --   10.2   RBC   --   2.04*   HGB  10.6*  6.7*   HCT   --   20.7*   PLT   --   218          Failed - Uric acid greater than or equal to 6 on file in past 12 months    No lab results found.  If level is 6mg/dL or greater, ok to refill one time and refer to provider.          Failed - Normal serum creatinine on file in the past 12 months    Recent Labs   Lab Test  05/18/17   1112   CR  5.64*          Passed - ALT on file in past 12 months    Recent Labs   Lab Test  05/18/17   1112   ALT  15          Passed - Recent or future visit with authorizing provider's specialty    Patient had office visit in the last year or has a visit in the next 30 days with authorizing provider.  See \"Patient Info\" tab in inbasket, or \"Choose Columns\" in Meds & Orders section of the refill encounter.          Passed - Patient is age 18 or older          "

## 2018-02-22 NOTE — PROGRESS NOTES
SUBJECTIVE:   Bean Croft is a 77 year old male who presents to clinic today for the following health issues:    Hypertension Follow-up    Outpatient blood pressures are not being checked.     Low Salt Diet: no added salt    Amount of exercise or physical activity: None    Problems taking medications regularly: No    Medication side effects: none    Diet: regular (no restrictions)    Patient continues with metoprolol 50 mg, Hydralazine 100mg TID, amlodipine 10 mg    BP Readings from Last 3 Encounters:   02/22/18 128/50   08/17/17 126/60   05/18/17 124/50     Hyperlipidemia Follow-Up    Rate your low fat/cholesterol diet?: good    Taking statin?  Yes, simvastatin 40 mg. no muscle aches from statin    Other lipid medications/supplements?:  none    Vascular Disease Follow-up:  Coronary Artery Disease (CAD)    Chest pain or pressure, left side neck or arm pain: No    Shortness of breath/increased sweats/nausea with exertion: No    Pain in calves walking 1-2 blocks: did not ask    Worsened or new symptoms since last visit: No    Nitroglycerin use: no    Daily aspirin use: Yes, continues with 81 mg daily    Chronic Kidney Disease Follow-up    Current NSAID use?  No    Follows with Dr. Goel. Continues with dialysis Tuesday's and Saturday's at [facility]. No blood but given iron and EPO Tuesdays.     Recently started Lasix 40 mg on non-dialysis days for ankle swelling. Since starting Bean repots they've been taking more blood at dialysis which causes leg cramps and occasional syncope (x3). Urination normal.    Bean and his wife report that [facility] checks his A1C every 1-2 months and glucose every visit.     Gout- This past Tuesday Bean felt the beginning of a gout flare but it hasn't worsened. Continues with allopurinol 100 mg.     Problem list and histories reviewed & adjusted, as indicated.  Additional history: as documented. No diabetes rx for about 1 year.    Labs reviewed in EPIC    Reviewed and  "updated as needed this visit by clinical staff  Tobacco  Allergies  Meds  Med Hx  Surg Hx  Fam Hx  Soc Hx      Reviewed and updated as needed this visit by Provider       ROS:  CONSTITUTIONAL: NEGATIVE for fever, chills, change in weight  INTEGUMENTARY/SKIN: NEGATIVE for worrisome rashes, moles or lesions  ENT/MOUTH: NEGATIVE for ear, mouth and throat problems  RESP: NEGATIVE for significant cough or SOB  CV: HTN - use of metoprolol, Hydralazine, amlodipine; NEGATIVE for chest pain, palpitations or peripheral edema  GI: POSITIVE for constipation - one stool softener per day; NEGATIVE for nausea, abdominal pain, heartburn, or change in bowel habits  MUSCULOSKELETAL: POSITIVE for ankle swelling- started Lasix; calf cramping during dialysis since starting Lasix; NEGATIVE for significant arthralgias or myalgia  NEURO: NEGATIVE for weakness, dizziness or paresthesias  ENDOCRINE: Diabetes - no current medications x1 year; Hyperlipidemia - use of simvastatin; CKD stage 5 - undergoing hemodialysis since 11/2016; NEGATIVE for temperature intolerance, skin/hair changes  HEME/ALLERGY/IMMUNE: NEGATIVE for bleeding problems  PSYCHIATRIC: NEGATIVE for changes in mood or affect    This document serves as a record of the services and decisions personally performed and made by Shaila Silverman MD. It was created on her behalf by Que Montalvo, a trained medical scribe. The creation of this document is based the provider's statements to the medical scribe.  Scribjeri Montalvo 4:25 PM, February 22, 2018    OBJECTIVE:   /50  Pulse 55  Temp 97.3  F (36.3  C) (Oral)  Resp 17  Ht 1.651 m (5' 5\")  Wt 64.4 kg (142 lb)  SpO2 98%  BMI 23.63 kg/m2  Body mass index is 23.63 kg/(m^2).     GENERAL: healthy, alert and no distress  RESP: lungs clear to auscultation - no rales, rhonchi or wheezes  CV: regular rate and rhythm, normal S1 S2, 1+ peripheral edema   left hemodialysis access  ABDOMEN: soft, nontender, no " hepatosplenomegaly, no masses and bowel sounds normal  MS: no gross musculoskeletal defects noted, no edema  NEURO: Normal strength and tone, mentation intact and speech normal  PSYCH: mentation appears normal, affect normal/bright    Diagnostic Test Results: none     ASSESSMENT/PLAN:   (E11.59) Type 2 diabetes mellitus with other circulatory complication, without long-term current use of insulin (H)  (primary encounter diagnosis)  Comment: diet contrrolled; no DM medications.  montor A1C  Plan: FOOT EXAM, Lipid panel reflex to direct LDL         Fasting, Comprehensive metabolic panel,         Hemoglobin A1c          (I12.9,  N18.4) Benign hypertension with chronic kidney disease, stage IV (H)  Comment: continue to monitor, no change in medication  Plan: amLODIPine (NORVASC) 10 MG tablet, hydrALAZINE         (APRESOLINE) 100 MG TABS tablet, Prealbumin    (N18.6,  Z99.2) ESRD (end stage renal disease) on dialysis (H)  Comment: [facility] 2 times per week; Tues and Saturday;  Followed by Dr. Goel; still making urine  Plan: Return for fasting labs     (I25.10) Coronary artery disease involving native coronary artery of native heart without angina pectoris  Comment: previously had left  Carotid surgery  Plan:     (E78.5) Hyperlipidemia, unspecified hyperlipidemia type  Comment: no labs today as pt isn't fasting. LDL historically at goal on simvastatin, anticipate no change in med/dosage   LDL Cholesterol Calculated   Date Value Ref Range Status   05/18/2017 48 <100 mg/dL Final     Comment:     Desirable:       <100 mg/dl   04/19/2016 44 <100 mg/dL Final     Comment:     Desirable:       <100 mg/dl   Plan: continue on simvastatin no change, monitor sx.     (M10.9) Acute gouty arthropathy  Comment: right great toe periodic flares, recently felt on Saturday but hasn't worsened. Return for fasting labs   Plan: allopurinol (ZYLOPRIM) 100 MG tablet, Uric acid          Orders Placed This Encounter   Medications      amLODIPine (NORVASC) 10 MG tablet     Sig: Take 1 tablet (10 mg) by mouth daily     Dispense:  90 tablet     Refill:  3     hydrALAZINE (APRESOLINE) 100 MG TABS tablet     Sig: TAKE 1 TABLET(100 MG) BY MOUTH THREE TIMES DAILY     Dispense:  270 tablet     Refill:  3     allopurinol (ZYLOPRIM) 100 MG tablet     Sig: Take 1 tablet (100 mg) by mouth daily     Dispense:  90 tablet     Refill:  3     Multiple Vitamins-Minerals (DIALYVITE 800/ULTRA D) TABS     Sig: Take 1 tablet by mouth daily     Refill:  11        - Continue other medications without change    FUTURE LABS:       - Schedule a fasting blood draw next week    Shaila Silverman MD  Internal Medicine  [facility]    The information in this document, created by the medical scribe for me, accurately reflects the services I personally performed and the decisions made by me. I have reviewed and approved this document for accuracy prior to leaving the patient care area.  4:42 PM, 02/22/18

## 2018-02-22 NOTE — MR AVS SNAPSHOT
After Visit Summary   2/22/2018    Bean Croft    MRN: [id number]           Patient Information     Date Of Birth          1940        Visit Information        Provider Department      2/22/2018 3:30 PM Shaila Silverman MD [facility]        Today's Diagnoses     Type 2 diabetes mellitus with other circulatory complication, without long-term current use of insulin (H)    -  1    Benign hypertension with chronic kidney disease, stage IV (H)        ESRD (end stage renal disease) on dialysis (H)        Coronary artery disease involving native coronary artery of native heart without angina pectoris        Hyperlipidemia, unspecified hyperlipidemia type        Acute gouty arthropathy           Follow-ups after your visit        Future tests that were ordered for you today     Open Future Orders        Priority Expected Expires Ordered    Lipid panel reflex to direct LDL Fasting Routine  6/22/2018 2/22/2018    Comprehensive metabolic panel Routine  6/22/2018 2/22/2018    Hemoglobin A1c Routine  6/22/2018 2/22/2018    Uric acid Routine  6/22/2018 2/22/2018    Prealbumin Routine  4/22/2018 2/22/2018            Who to contact     If you have questions or need follow up information about today's clinic visit or your schedule please contact [facility] directly at [phone].  Normal or non-critical lab and imaging results will be communicated to you by MyChart, letter or phone within 4 business days after the clinic has received the results. If you do not hear from us within 7 days, please contact the clinic through MyChart or phone. If you have a critical or abnormal lab result, we will notify you by phone as soon as possible.  Submit refill requests through [employer] or call your pharmacy and they will forward the refill request to us. Please allow 3 business days for your refill to be completed.          Additional Information About Your Visit        [employer]  "Information     [employer] gives you secure access to your electronic health record. If you see a primary care provider, you can also send messages to your care team and make appointments. If you have questions, please call your primary care clinic.  If you do not have a primary care provider, please call [phone] and they will assist you.        Care EveryWhere ID     This is your Care EveryWhere ID. This could be used by other organizations to access your [place] medical records  [id number]        Your Vitals Were     Pulse Temperature Respirations Height Pulse Oximetry BMI (Body Mass Index)    55 97.3  F (36.3  C) (Oral) 17 5' 5\" (1.651 m) 98% 23.63 kg/m2       Blood Pressure from Last 3 Encounters:   02/22/18 128/50   08/17/17 126/60   05/18/17 124/50    Weight from Last 3 Encounters:   02/22/18 142 lb (64.4 kg)   08/17/17 142 lb 8 oz (64.6 kg)   05/18/17 149 lb 8 oz (67.8 kg)              We Performed the Following     FOOT EXAM          Today's Medication Changes          These changes are accurate as of 2/22/18  4:40 PM.  If you have any questions, ask your nurse or doctor.               These medicines have changed or have updated prescriptions.        Dose/Directions    hydrALAZINE 100 MG Tabs tablet   Commonly known as:  APRESOLINE   This may have changed:  See the new instructions.   Used for:  Benign hypertension with chronic kidney disease, stage IV (H)   Changed by:  Shaila Silverman MD        TAKE 1 TABLET(100 MG) BY MOUTH THREE TIMES DAILY   Quantity:  270 tablet   Refills:  3            Where to get your medicines      These medications were sent to [facility] [address] [place], [place] - [address] AT [facility] [address] & [facility]  [address] [facility] [address]     Phone:  [phone]     allopurinol 100 MG tablet    amLODIPine 10 MG tablet    hydrALAZINE 100 MG Tabs tablet                Primary Care Provider Office Phone # Fax #    Shaila Silverman MD " [phone]       [address]  [facility] [address]        Equal Access to Services     YOSHI COTO : Hadii sylwia branham sterling Huizar, waaxda luqadaha, qaybta kaalmada johana, madison richards. So [facility] [phone].    ATENCIÓN: Si habla español, tiene a tidwell disposición servicios gratuitos de asistencia lingüística. Llame al [phone].    We comply with applicable federal civil rights laws and [place] laws. We do not discriminate on the basis of race, color, national origin, age, disability, sex, sexual orientation, or gender identity.            Thank you!     Thank you for choosing [facility]  for your care. Our goal is always to provide you with excellent care. Hearing back from our patients is one way we can continue to improve our services. Please take a few minutes to complete the written survey that you may receive in the mail after your visit with us. Thank you!             Your Updated Medication List - Protect others around you: Learn how to safely use, store and throw away your medicines at www.disposemymeds.org.          This list is accurate as of 2/22/18  4:40 PM.  Always use your most recent med list.                   Brand Name Dispense Instructions for use Diagnosis    allopurinol 100 MG tablet    ZYLOPRIM    90 tablet    Take 1 tablet (100 mg) by mouth daily    Acute gouty arthropathy       amLODIPine 10 MG tablet    NORVASC    90 tablet    Take 1 tablet (10 mg) by mouth daily    Benign hypertension with chronic kidney disease, stage IV (H)       aspirin 81 MG EC tablet      Take 81 mg by mouth daily        DIALYVITE 800/ULTRA D Tabs      Take 1 tablet by mouth daily        docusate sodium 100 MG capsule    COLACE     Take 100 mg by mouth 2 times daily        furosemide 40 MG tablet    LASIX    90 tablet    Take 1 tablet (40 mg) by mouth daily On non-dialysis days    ESRD (end stage renal disease) on dialysis (H), Edema, unspecified  type       hydrALAZINE 100 MG Tabs tablet    APRESOLINE    270 tablet    TAKE 1 TABLET(100 MG) BY MOUTH THREE TIMES DAILY    Benign hypertension with chronic kidney disease, stage IV (H)       metoprolol succinate 50 MG 24 hr tablet    TOPROL-XL    30 tablet    Take 1 tablet (50 mg) by mouth daily    Coronary artery disease involving native coronary artery of native heart without angina pectoris, Essential hypertension with goal blood pressure less than 140/90       omeprazole 20 MG CR capsule    priLOSEC    90 capsule    TAKE 1 CAPSULE(20 MG) BY MOUTH DAILY    Heartburn       simvastatin 40 MG tablet    ZOCOR    90 tablet    TAKE 1 TABLET(40 MG) BY MOUTH AT BEDTIME    Hyperlipidemia LDL goal <100

## 2018-03-22 NOTE — TELEPHONE ENCOUNTER
This was sent for 6 months in December.   Sent back as duplicate.  Kassandra Machado, RN  Triage Nurse

## 2018-03-22 NOTE — TELEPHONE ENCOUNTER
"Requested Prescriptions   Pending Prescriptions Disp Refills     simvastatin (ZOCOR) 40 MG tablet [Pharmacy Med Name: SIMVASTATIN 40MG TABLETS]  This medication may not be due for a refill  Last Written Prescription Date:  12/22/2017  Last Fill Quantity: 90 tablet,  # refills: 1   Last office visit: 2/22/2018 with prescribing provider:  Shaila Silverman   Future Office Visit:     90 tablet 0     Sig: TAKE 1 TABLET(40 MG) BY MOUTH AT BEDTIME    Statins Protocol Passed    3/21/2018  5:13 PM       Passed - LDL on file in past 12 months    Recent Labs   Lab Test  03/01/18   1005   LDL  44            Passed - No abnormal creatine kinase in past 12 months    No lab results found.            Passed - Recent (12 mo) or future (30 days) visit within the authorizing provider's specialty    Patient had office visit in the last 12 months or has a visit in the next 30 days with authorizing provider or within the authorizing provider's specialty.  See \"Patient Info\" tab in inbasket, or \"Choose Columns\" in Meds & Orders section of the refill encounter.           Passed - Patient is age 18 or older          "

## 2018-06-11 NOTE — PROGRESS NOTES
[facility]  [address] [facility] [address] [phone]  Dept: [phone]    PRE-OP EVALUATION:  Today's date: 2018    Bean Croft (: 1940) presents for pre-operative evaluation assessment as requested by Dr. Lauri Alvarez.  He requires evaluation and anesthesia risk assessment prior to undergoing surgery/procedure for treatment of cataracts .    Fax number for surgical facility: Please fax to both locations  [facility] [phone]  [facility]  [phone]  Primary Physician: Shaila Silverman  Type of Anesthesia Anticipated: Local with MAC    Patient has a Health Care Directive or Living Will:  NO    Preop Questions 2018   Who is doing your surgery? Dr Lauri Alvarez   What are you having done? Cataracts     Date of Surgery/Procedure:   1:15 pm     12:30 pm    Facility or Hospital where procedure/surgery will be performed: [facility]    1.  Do you have a history of Heart attack, stroke, stent, coronary bypass surgery, or other heart surgery? YES  - Heart attack   2.  Do you ever have any pain or discomfort in your chest? No   3.  Do you have a history of  Heart Failure? No   4.   Are you troubled by shortness of breath when:  walking on a level surface, or up a slight hill, or at night? No   5.  Do you currently have a cold, bronchitis or other respiratory infection? No   6.  Do you have a cough, shortness of breath, or wheezing? No   7.  Do you sometimes get pains in the calves of your legs when you walk? YES - not when walking but when sleeping   From Dialysis   8. Do you or anyone in your family have previous history of blood clots? No   9.  Do you or does anyone in your family have a serious bleeding problem such as prolonged bleeding following surgeries or cuts? YES - some bleeding when related to dialysis   10. Have you ever had problems with anemia or been told to take iron pills? YES - Hg has been low  and gets Iron Infusions with his dialysis   11. Have you had any abnormal blood loss such as black, tarry or bloody stools? No   12. Have you ever had a blood transfusion? No   13. Have you or any of your relatives ever had problems with anesthesia? No   14. Do you have sleep apnea, excessive snoring or daytime drowsiness? YES - does not use a c pap machine    15. Do you have any prosthetic heart valves? No   16. Do you have prosthetic joints? No         HPI:     HPI related to upcoming procedure: vision changes associated with cataracts; pt receives hemodialysis on Tuesday  and Saturdays.       MEDICAL HISTORY:     Patient Active Problem List    Diagnosis Date Noted     Pseudoaneurysm (H) 11/15/2016     Priority: Medium     Pseudoaneurysm of AV hemodialysis fistula (H) 11/15/2016     Priority: Medium     Physical deconditioning 07/28/2016     Priority: Medium     C. difficile colitis 07/28/2016     Priority: Medium     CKD (chronic kidney disease) stage 4, GFR 15-29 ml/min (H) 07/28/2016     Priority: Medium     Benign hypertension with chronic kidney disease, stage IV (H) 07/28/2016     Priority: Medium     Diverticulitis of colon 07/24/2016     Priority: Medium     Diarrhea 07/24/2016     Priority: Medium     Diverticulitis 07/24/2016     Priority: Medium     Anemia in chronic kidney disease 05/10/2016     Priority: Medium     Anemia in stage 4 chronic kidney disease (H) 05/10/2016     Priority: Medium     Left carotid stenosis 04/19/2016     Priority: Medium     UTI (urinary tract infection) 12/24/2014     Priority: Medium     Coronary artery disease      Priority: Medium     Hyperlipidemia      Priority: Medium     Diagnosis updated by automated process. Provider to review and confirm.       Hypertension      Priority: Medium     Myocardial infarction      Priority: Medium     Peripheral vascular disease (H)      Priority: Medium     Problem list name updated by automated process. Provider to review        Gastroesophageal reflux disease      Priority: Medium     Diagnosis updated by automated process. Provider to review and confirm.       Diabetes (H)      Priority: Medium      Past Medical History:   Diagnosis Date     Anemia     unspecified     Arthritis     generalized     Carotid artery stenosis      CHF (congestive heart failure) (H)      Chronic kidney disease     Stage IV     Coronary artery disease      Dementia      Diabetes mellitus (H)     Non-insulin Dependent     Diabetic retinopathy (H)      Diverticulitis 7/2016     DM (diabetes mellitus screen)      Enlarged prostate      Gastro-oesophageal reflux disease      Gout      Hearing loss      History of left-sided carotid endarterectomy 4/19/16     Hyperlipidaemia      Hypertension      Low back pain      Peripheral vascular disease (H)      Pulmonary nodule      Seborrheic keratosis      Sleep apnea     stated by patient - not official diagnosis     Past Surgical History:   Procedure Laterality Date     CORONARY ANGIOGRAPHY ADULT ORDER  1996    ???CLAIRE to mid LAD, initial obstruction 95%     CREATE FISTULA ARTERIOVENOUS UPPER EXTREMITY Left 5/31/2016    Procedure: CREATE FISTULA ARTERIOVENOUS UPPER EXTREMITY;  Surgeon: Batsheva Wright MD;  Location:  OR     ENDARTERECTOMY CAROTID Left 4/19/2016    Procedure: ENDARTERECTOMY CAROTID;  Surgeon: Batsheva Wright MD;  Location:  OR     REPAIR FISTULA ARTERIOVENOUS UPPER EXTREMITY Left 11/15/2016    Procedure: REPAIR FISTULA ARTERIOVENOUS UPPER EXTREMITY;  Surgeon: Tae Hendrix MD;  Location:  OR     TONSILLECTOMY, ADENOIDECTOMY, COMBINED       Current Outpatient Prescriptions   Medication Sig Dispense Refill     allopurinol (ZYLOPRIM) 100 MG tablet Take 1 tablet (100 mg) by mouth daily 90 tablet 3     amLODIPine (NORVASC) 10 MG tablet Take 1 tablet (10 mg) by mouth daily 90 tablet 3     aspirin 81 MG EC tablet Take 81 mg by mouth daily       docusate sodium (COLACE) 100 MG  capsule Take 100 mg by mouth 2 times daily       donepezil (ARICEPT) 5 MG tablet Take 1 tablet by mouth daily  5     furosemide (LASIX) 80 MG tablet Take 1 tablet (80 mg) by mouth daily On non-dialysis days 60 tablet 3     hydrALAZINE (APRESOLINE) 100 MG TABS tablet TAKE 1 TABLET(100 MG) BY MOUTH THREE TIMES DAILY 270 tablet 3     metoprolol (TOPROL-XL) 50 MG 24 hr tablet Take 1 tablet (50 mg) by mouth daily 30 tablet 11     Multiple Vitamins-Minerals (DIALYVITE 800/ULTRA D) TABS Take 1 tablet by mouth daily  11     omeprazole (PRILOSEC) 20 MG CR capsule TAKE 1 CAPSULE(20 MG) BY MOUTH DAILY 90 capsule 3     simvastatin (ZOCOR) 40 MG tablet TAKE 1 TABLET(40 MG) BY MOUTH AT BEDTIME 90 tablet 1     OTC products: None, except as noted above    Allergies   Allergen Reactions     Ace Inhibitors Cough      Latex Allergy: NO    Social History   Substance Use Topics     Smoking status: Former Smoker     Packs/day: 0.50     Types: Cigarettes     Start date: 1/1/1956     Quit date: 1/25/2016     Smokeless tobacco: Never Used      Comment: a pack every 4 days     Alcohol use No     History   Drug Use No       REVIEW OF SYSTEMS:   CONSTITUTIONAL: NEGATIVE for fever, chills, change in weight  INTEGUMENTARY/SKIN: NEGATIVE for worrisome rashes, moles or lesions  EYES: cataracts, bilaterally;  ENT/MOUTH: NEGATIVE for ear, mouth and throat problems  RESP: NEGATIVE for significant cough or SOB  CV: NEGATIVE for chest pain, palpitations or peripheral edema  GI: NEGATIVE for nausea, abdominal pain, heartburn, or change in bowel habits   male :CKD with hemodialysis Tues and Saturday; left arm with HD access;  MUSCULOSKELETAL:using walker for ambulation;  NEURO: NEGATIVE for weakness, dizziness or paresthesias  ENDOCRINE:   Lab Results   Component Value Date    LDL 44 03/01/2018     Lab Results   Component Value Date    A1C 6.2 03/27/2018     HEME: NEGATIVE for bleeding problems  PSYCHIATRIC: NEGATIVE for changes in mood or  affect    EXAM:   /46  Pulse 65  Temp 97.8  F (36.6  C) (Oral)  Resp 17  Wt 138 lb 1.6 oz (62.6 kg)  SpO2 98%  BMI 22.98 kg/m2    GENERAL APPEARANCE: healthy, alert and no distress     EYES: vision changes associated with      HENT: ear canals and TM's normal and nose and mouth without ulcers or lesions     NECK: no adenopathy, no asymmetry, masses, or scars and thyroid normal to palpation     RESP: lungs clear to auscultation - no rales, rhonchi or wheezes     CV: regular rates and rhythm and no irregular beats     ABDOMEN:  soft, nontender, no HSM or masses and bowel sounds normal     GU_male: left arm  HD site with palpable bruit;      MS: extremities normal- no gross deformities noted, no evidence of inflammation in joints, FROM in all extremities.     SKIN: no suspicious lesions or rashes     NEURO: Normal strength and tone, sensory exam grossly normal, mentation intact and speech normal     PSYCH: mentation appears normal. and affect normal/bright     LYMPHATICS: No cervical adenopathy    DIAGNOSTICS:   EKG: appears normal, NSR, normal axis, normal intervals, no acute ST/T changes c/w ischemia, no LVH by voltage criteria    Recent Labs   Lab Test 03/27/18 03/01/18   1005  05/18/17   1112  11/16/16   0815  11/16/16   0725  11/15/16   2120   05/11/15   1534   HGB   --    --   10.6*   --   6.7*  7.1*   < >  10.9*   PLT   --    --    --    --   218  255   < >  258   INR   --    --    --   1.00   --    --    --   0.99   NA   --   136  139   --   140  144   < >  139   POTASSIUM  4.1  4.1  4.7   --   4.3  4.3   < >  5.2   CR  6.55*  5.44*  5.64*   --   3.95*  3.60*   < >  2.72*   A1C  6.2*  6.1*  6.1*   --   5.6   --    < >   --     < > = values in this interval not displayed.        IMPRESSION:   Reason for surgery/procedure: vision changes related to Bilateral cataracts  Diagnosis/reason for consult: vision changes    The proposed surgical procedure is considered LOW risk.    REVISED CARDIAC RISK  INDEX  The patient has the following serious cardiovascular risks for perioperative complications such as (MI, PE, VFib and 3  AV Block):  No serious cardiac risks  INTERPRETATION: 0 risks: Class I (very low risk - 0.4% complication rate)    The patient has the following additional risks for perioperative complications:  No identified additional risks      ICD-10-CM    1. Preop general physical exam Z01.818 EKG 12-lead complete w/read - Clinics   2. Cataract of both eyes, unspecified cataract type H26.9 EKG 12-lead complete w/read - Clinics   3. CKD (chronic kidney disease) stage V requiring chronic dialysis (H) N18.6     Z99.2        RECOMMENDATIONS:       --Patient is to take all scheduled medications on the day of surgery EXCEPT for modifications listed below.    APPROVAL GIVEN to proceed with proposed procedure, without further diagnostic evaluation      All AM medications can be taken without interruption; with a small sip of water.   No change in medication administration.   ( OK to hold Aricept on AM of surgery- loose stool may be related to this new medication).  Continue Hemodialysis every Tues and Saturday.  EKG not usually needed for Cataracts but has been requested by surgeon/surgery center. Done as requested;   Proceed with surgery ( both eyes ) over the next month.        Signed Electronically by: Shaila Silverman MD    Copy of this evaluation report is provided to requesting physician.    [facility] Preop Guidelines    Revised Cardiac Risk Index

## 2018-06-11 NOTE — MR AVS SNAPSHOT
After Visit Summary   6/11/2018    Bean Croft    MRN: [id number]           Patient Information     Date Of Birth          1940        Visit Information        Provider Department      6/11/2018 11:30 AM Shaila Silverman MD [facility]        Today's Diagnoses     Preop general physical exam    -  1    Cataract of both eyes, unspecified cataract type        CKD (chronic kidney disease) stage V requiring chronic dialysis (H)          Care Instructions      Before Your Surgery      Call your surgeon if there is any change in your health. This includes signs of a cold or flu (such as a sore throat, runny nose, cough, rash or fever).    Do not smoke, drink alcohol or take over the counter medicine (unless your surgeon or primary care doctor tells you to) for the 24 hours before and after surgery.    If you take prescribed drugs: Follow your doctor s orders about which medicines to take and which to stop until after surgery.    Eating and drinking prior to surgery: follow the instructions from your surgeon    Take a shower or bath the night before surgery. Use the soap your surgeon gave you to gently clean your skin. If you do not have soap from your surgeon, use your regular soap. Do not shave or scrub the surgery site.  Wear clean pajamas and have clean sheets on your bed.       All AM medications can be taken without interruption; with a small sip of water.   No change in medication administration.   ( OK to hold Aricept on AM of surgery- loose stool may be related to this new medication)          Follow-ups after your visit        Follow-up notes from your care team     Return in about 3 months (around 9/11/2018).      Who to contact     If you have questions or need follow up information about today's clinic visit or your schedule please contact [facility] directly at [phone].  Normal or non-critical lab and imaging results will be communicated to  you by MyChart, letter or phone within 4 business days after the clinic has received the results. If you do not hear from us within 7 days, please contact the clinic through [employer] or phone. If you have a critical or abnormal lab result, we will notify you by phone as soon as possible.  Submit refill requests through [employer] or call your pharmacy and they will forward the refill request to us. Please allow 3 business days for your refill to be completed.          Additional Information About Your Visit        [employer] Information     [employer] gives you secure access to your electronic health record. If you see a primary care provider, you can also send messages to your care team and make appointments. If you have questions, please call your primary care clinic.  If you do not have a primary care provider, please call [phone] and they will assist you.        Care EveryWhere ID     This is your Care EveryWhere ID. This could be used by other organizations to access your [place] medical records  [id number]        Your Vitals Were     Pulse Temperature Respirations Pulse Oximetry BMI (Body Mass Index)       65 97.8  F (36.6  C) (Oral) 17 98% 22.98 kg/m2        Blood Pressure from Last 3 Encounters:   06/11/18 124/46   02/22/18 128/50   08/17/17 126/60    Weight from Last 3 Encounters:   06/11/18 138 lb 1.6 oz (62.6 kg)   02/22/18 142 lb (64.4 kg)   08/17/17 142 lb 8 oz (64.6 kg)              We Performed the Following     EKG 12-lead complete w/read - Clinics        Primary Care Provider Office Phone # Fax #    Shaila Silverman [phone] [address]  [facility] [address]        Equal Access to Services     [facility] Center[facility] : Hadii aad yonas hadasho Sogoran, waaxda luqadaha, qaybta kaalmada adepedroyada, madison richards. So [facility] [phone].    ATENCIÓN: Si habla español, tiene a tidwell disposición servicios gratuitos de asistencia lingüística. Llame al  [phone].    We comply with applicable federal civil rights laws and [place] laws. We do not discriminate on the basis of race, color, national origin, age, disability, sex, sexual orientation, or gender identity.            Thank you!     Thank you for choosing [facility]  for your care. Our goal is always to provide you with excellent care. Hearing back from our patients is one way we can continue to improve our services. Please take a few minutes to complete the written survey that you may receive in the mail after your visit with us. Thank you!             Your Updated Medication List - Protect others around you: Learn how to safely use, store and throw away your medicines at www.disposemymeds.org.          This list is accurate as of 6/11/18  1:25 PM.  Always use your most recent med list.                   Brand Name Dispense Instructions for use Diagnosis    allopurinol 100 MG tablet    ZYLOPRIM    90 tablet    Take 1 tablet (100 mg) by mouth daily    Acute gouty arthropathy       amLODIPine 10 MG tablet    NORVASC    90 tablet    Take 1 tablet (10 mg) by mouth daily    Benign hypertension with chronic kidney disease, stage IV (H)       aspirin 81 MG EC tablet      Take 81 mg by mouth daily        DIALYVITE 800/ULTRA D Tabs      Take 1 tablet by mouth daily        docusate sodium 100 MG capsule    COLACE     Take 100 mg by mouth 2 times daily        donepezil 5 MG tablet    ARIcept     Take 1 tablet by mouth daily        furosemide 80 MG tablet    LASIX    60 tablet    Take 1 tablet (80 mg) by mouth daily On non-dialysis days    ESRD (end stage renal disease) on dialysis (H), Edema, unspecified type       hydrALAZINE 100 MG Tabs tablet    APRESOLINE    270 tablet    TAKE 1 TABLET(100 MG) BY MOUTH THREE TIMES DAILY    Benign hypertension with chronic kidney disease, stage IV (H)       metoprolol succinate 50 MG 24 hr tablet    TOPROL-XL    30 tablet    Take 1 tablet (50 mg) by mouth daily     Coronary artery disease involving native coronary artery of native heart without angina pectoris, Essential hypertension with goal blood pressure less than 140/90       omeprazole 20 MG CR capsule    priLOSEC    90 capsule    TAKE 1 CAPSULE(20 MG) BY MOUTH DAILY    Heartburn       simvastatin 40 MG tablet    ZOCOR    90 tablet    TAKE 1 TABLET(40 MG) BY MOUTH AT BEDTIME    Hyperlipidemia LDL goal <100

## 2018-06-11 NOTE — PATIENT INSTRUCTIONS
Before Your Surgery      Call your surgeon if there is any change in your health. This includes signs of a cold or flu (such as a sore throat, runny nose, cough, rash or fever).    Do not smoke, drink alcohol or take over the counter medicine (unless your surgeon or primary care doctor tells you to) for the 24 hours before and after surgery.    If you take prescribed drugs: Follow your doctor s orders about which medicines to take and which to stop until after surgery.    Eating and drinking prior to surgery: follow the instructions from your surgeon    Take a shower or bath the night before surgery. Use the soap your surgeon gave you to gently clean your skin. If you do not have soap from your surgeon, use your regular soap. Do not shave or scrub the surgery site.  Wear clean pajamas and have clean sheets on your bed.       All AM medications can be taken without interruption; with a small sip of water.   No change in medication administration.   ( OK to hold Aricept on AM of surgery- loose stool may be related to this new medication)

## 2018-06-20 NOTE — TELEPHONE ENCOUNTER
"Requested Prescriptions   Pending Prescriptions Disp Refills     omeprazole (PRILOSEC) 20 MG CR capsule [Pharmacy Med Name: OMEPRAZOLE 20MG CAPSULES]    Last Written Prescription Date:  6/15/2017  Last Fill Quantity: 90,  # refills: 3   Last office visit: 6/11/2018 with prescribing provider:  Shaila Silverman     Future Office Visit:     90 capsule 0     Sig: TAKE 1 CAPSULE(20 MG) BY MOUTH DAILY    PPI Protocol Passed    6/20/2018 10:07 AM       Passed - Not on Clopidogrel (unless Pantoprazole ordered)       Passed - No diagnosis of osteoporosis on record       Passed - Recent (12 mo) or future (30 days) visit within the authorizing provider's specialty    Patient had office visit in the last 12 months or has a visit in the next 30 days with authorizing provider or within the authorizing provider's specialty.  See \"Patient Info\" tab in inbasket, or \"Choose Columns\" in Meds & Orders section of the refill encounter.           Passed - Patient is age 18 or older          "

## 2018-06-20 NOTE — TELEPHONE ENCOUNTER
Prescription approved per [facility] [place].    Ilda MELGAR RN, BSN, PHN  [place] [facility] RN

## 2018-07-05 NOTE — PATIENT INSTRUCTIONS
Before Your Surgery      Call your surgeon if there is any change in your health. This includes signs of a cold or flu (such as a sore throat, runny nose, cough, rash or fever).    Do not smoke, drink alcohol or take over the counter medicine (unless your surgeon or primary care doctor tells you to) for the 24 hours before and after surgery.    If you take prescribed drugs: Follow your doctor s orders about which medicines to take and which to stop until after surgery.    Eating and drinking prior to surgery: follow the instructions from your surgeon    Take a shower or bath the night before surgery. Use the soap your surgeon gave you to gently clean your skin. If you do not have soap from your surgeon, use your regular soap. Do not shave or scrub the surgery site.  Wear clean pajamas and have clean sheets on your bed.     Take medications in the AM as you normally take with a small sip of water.

## 2018-07-05 NOTE — PROGRESS NOTES
[facility]  [address] [address] [phone]  Dept: [phone]    PRE-OP EVALUATION:  Today's date: 2018    Bean Croft (: 1940) presents for pre-operative evaluation assessment as requested by Dr. Lauri Alvarez.  He requires evaluation and anesthesia risk assessment prior to undergoing surgery/procedure for treatment of cataracts .    Proposed Surgery/ Procedure: Removal of cataract and lens implant- right eye  Date of Surgery/ Procedure: 18  Time of Surgery/ Procedure: 12:30 pm  Hospital/Surgical Facility: [facility]  Fax number for surgical facility: Please fax to both locations  [facility] [phone]  [facility] [place]  [phone]  Primary Physician: Shaila Silverman  Type of Anesthesia Anticipated: Local with MAC    Patient has a Health Care Directive or Living Will:  NO    1. YES - DO YOU HAVE A HISTORY OF HEART ATTACK, STROKE, STENT, BYPASS OR SURGERY ON AN ARTERY IN THE HEAD, NECK, HEART OR LEG? Heart Attack  2. NO - Do you ever have any pain or discomfort in your chest?  3. NO - Do you have a history of  Heart Failure?  4. NO - Are you troubled by shortness of breath when: walking on the level, up a slight hill or at night?  5. NO - Do you currently have a cold, bronchitis or other respiratory infection?  6. NO - Do you have a cough, shortness of breath or wheezing?  7. YES - DO YOU SOMETIMES GET PAINS IN THE CALVES OF YOUR LEGS WHEN YOU WALK? not when walking but when sleeping   From Dialysis  8. NO - Do you or anyone in your family have previous history of blood clots?  9. YES - DO YOU OR DOES ANYONE IN YOUR FAMILY HAVE A SERIOUS BLEEDING PROBLEM SUCH AS PROLONGED BLEEDING FOLLOWING SURGERIES OR CUTS?  some bleeding when related to dialysis  10. YES - HAVE YOU EVER HAD PROBLEMS WITH ANEMIA OR BEEN TOLD TO TAKE IRON PILLS? Hg has been low and gets Iron Infusions with his dialysis  11. NO - Have you had  any abnormal blood loss such as black, tarry or bloody stools, or abnormal vaginal bleeding?  12. NO - Have you ever had a blood transfusion?  13. NO - Have you or any of your relatives ever had problems with anesthesia?  14. YES - DO YOU HAVE SLEEP APNEA, EXCESSIVE SNORING OR DAYTIME DROWSINESS? does not use a c pap machine   15. NO - Do you have any prosthetic heart valves?  16. NO - Do you have prosthetic joints?  17. NO - Is there any chance that you may be pregnant?      HPI:     HPI related to upcoming procedure: vision changes associated with cataracts; pt receives hemodialysis on Tuesday  and Saturdays. Recent left cataract surgery done in mid June was successful. He notes improved vision and is ready to have the right cataract surgery.                                                                                                                 .    MEDICAL HISTORY:     Patient Active Problem List    Diagnosis Date Noted     Pseudoaneurysm (H) 11/15/2016     Priority: Medium     Pseudoaneurysm of AV hemodialysis fistula (H) 11/15/2016     Priority: Medium     Physical deconditioning 07/28/2016     Priority: Medium     C. difficile colitis 07/28/2016     Priority: Medium     CKD (chronic kidney disease) stage 4, GFR 15-29 ml/min (H) 07/28/2016     Priority: Medium     Benign hypertension with chronic kidney disease, stage IV (H) 07/28/2016     Priority: Medium     Diverticulitis of colon 07/24/2016     Priority: Medium     Diarrhea 07/24/2016     Priority: Medium     Diverticulitis 07/24/2016     Priority: Medium     Anemia in chronic kidney disease 05/10/2016     Priority: Medium     Anemia in stage 4 chronic kidney disease (H) 05/10/2016     Priority: Medium     Left carotid stenosis 04/19/2016     Priority: Medium     UTI (urinary tract infection) 12/24/2014     Priority: Medium     Coronary artery disease      Priority: Medium     Hyperlipidemia      Priority: Medium     Diagnosis updated by automated  process. Provider to review and confirm.       Hypertension      Priority: Medium     Myocardial infarction      Priority: Medium     Peripheral vascular disease (H)      Priority: Medium     Problem list name updated by automated process. Provider to review       Gastroesophageal reflux disease      Priority: Medium     Diagnosis updated by automated process. Provider to review and confirm.       Diabetes (H)      Priority: Medium      Past Medical History:   Diagnosis Date     Anemia     unspecified     Arthritis     generalized     Carotid artery stenosis      CHF (congestive heart failure) (H)      Chronic kidney disease     Stage IV     Coronary artery disease      Dementia      Diabetes mellitus (H)     Non-insulin Dependent     Diabetic retinopathy (H)      Diverticulitis 7/2016     DM (diabetes mellitus screen)      Enlarged prostate      Gastro-oesophageal reflux disease      Gout      Hearing loss      History of left-sided carotid endarterectomy 4/19/16     Hyperlipidaemia      Hypertension      Low back pain      Peripheral vascular disease (H)      Pulmonary nodule      Seborrheic keratosis      Sleep apnea     stated by patient - not official diagnosis     Past Surgical History:   Procedure Laterality Date     CORONARY ANGIOGRAPHY ADULT ORDER  1996    ???CLAIRE to mid LAD, initial obstruction 95%     CREATE FISTULA ARTERIOVENOUS UPPER EXTREMITY Left 5/31/2016    Procedure: CREATE FISTULA ARTERIOVENOUS UPPER EXTREMITY;  Surgeon: Batsheva Wright MD;  Location:  OR     ENDARTERECTOMY CAROTID Left 4/19/2016    Procedure: ENDARTERECTOMY CAROTID;  Surgeon: Batsheva Wright MD;  Location:  OR     REPAIR FISTULA ARTERIOVENOUS UPPER EXTREMITY Left 11/15/2016    Procedure: REPAIR FISTULA ARTERIOVENOUS UPPER EXTREMITY;  Surgeon: Tae Hendrix MD;  Location:  OR     TONSILLECTOMY, ADENOIDECTOMY, COMBINED       Current Outpatient Prescriptions   Medication Sig Dispense Refill      allopurinol (ZYLOPRIM) 100 MG tablet Take 1 tablet (100 mg) by mouth daily 90 tablet 3     amLODIPine (NORVASC) 10 MG tablet Take 1 tablet (10 mg) by mouth daily 90 tablet 3     aspirin 81 MG EC tablet Take 81 mg by mouth daily       docusate sodium (COLACE) 100 MG capsule Take 100 mg by mouth 2 times daily       donepezil (ARICEPT) 5 MG tablet Take 1 tablet by mouth daily  5     furosemide (LASIX) 80 MG tablet Take 1 tablet (80 mg) by mouth daily On non-dialysis days 60 tablet 3     hydrALAZINE (APRESOLINE) 100 MG TABS tablet TAKE 1 TABLET(100 MG) BY MOUTH THREE TIMES DAILY 270 tablet 3     metoprolol (TOPROL-XL) 50 MG 24 hr tablet Take 1 tablet (50 mg) by mouth daily 30 tablet 11     Multiple Vitamins-Minerals (DIALYVITE 800/ULTRA D) TABS Take 1 tablet by mouth daily  11     omeprazole (PRILOSEC) 20 MG CR capsule TAKE 1 CAPSULE(20 MG) BY MOUTH DAILY 90 capsule 1     simvastatin (ZOCOR) 40 MG tablet TAKE 1 TABLET(40 MG) BY MOUTH AT BEDTIME 90 tablet 1     OTC products: None, except as noted above    Allergies   Allergen Reactions     Ace Inhibitors Cough     Penicillins      Other reaction(s): *Unknown  Pt was given it for rheumatic fever. MD told him to never take it again per pt statement. ? Allergy.      Latex Allergy: NO    Social History   Substance Use Topics     Smoking status: Former Smoker     Packs/day: 0.50     Types: Cigarettes     Start date: 1/1/1956     Quit date: 1/25/2016     Smokeless tobacco: Never Used      Comment: a pack every 4 days     Alcohol use No     History   Drug Use No       REVIEW OF SYSTEMS:   CONSTITUTIONAL: NEGATIVE for fever, chills, change in weight  INTEGUMENTARY/SKIN: NEGATIVE for worrisome rashes, moles or lesions  EYES: cataract   ENT/MOUTH: NEGATIVE for ear, mouth and throat problems  RESP: NEGATIVE for significant cough or SOB  CV: NEGATIVE for chest pain, palpitations or peripheral edema  GI: NEGATIVE for nausea, abdominal pain, heartburn, or change in bowel  habits  Renal: hemodialysis  Tues and Saturday; continues to produce urine.   MUSCULOSKELETAL: uses walker for ambulation;  Left arm with HD access  NEURO: NEGATIVE for weakness, dizziness or paresthesias  ENDOCRINE:   Lab Results   Component Value Date    LDL 44 03/01/2018     Lab Results   Component Value Date    A1C 6.2 03/27/2018     HEME/ALLERGY/IMMUNE:    Lab Results   Component Value Date    HGB 10.6 05/18/2017   Anemia of chronic disease.  PSYCHIATRIC: NEGATIVE for changes in mood or affect    EXAM:   /48  Pulse 57  Temp 97.6  F (36.4  C) (Oral)  Resp 17  Wt 139 lb 9.6 oz (63.3 kg)  SpO2 97%  BMI 23.23 kg/m2    GENERAL APPEARANCE: healthy, alert and no distress     EYES: Eyes grossly normal to inspection     HENT: ear canals and TM's normal and nose and mouth without ulcers or lesions     NECK: no adenopathy, no asymmetry, masses, or scars and thyroid normal to palpation     RESP: lungs clear to auscultation - no rales, rhonchi or wheezes     CV: regular rates and rhythm     ABDOMEN:  soft, nontender, no HSM or masses and bowel sounds normal     MS: extremities normal- no gross deformities noted; using walker for stability     NEURO: mentation intact; preserved strength      PSYCH: mentation appears normal. and affect normal/bright     LYMPHATICS: No cervical adenopathy      DIAGNOSTICS:   No labs or EKG required for low risk surgery (cataract, skin procedure, breast biopsy, etc)    Recent Labs   Lab Test 05/29/18 04/24/18 03/27/18 03/01/18   1005  05/18/17   1112  11/16/16   0815  11/16/16   0725  11/15/16   2120   05/11/15   1534   HGB   --    --    --    --   10.6*   --   6.7*  7.1*   < >  10.9*   PLT   --    --    --    --    --    --   218  255   < >  258   INR   --    --    --    --    --   1.00   --    --    --   0.99   NA   --    --    --   136  139   --   140  144   < >  139   POTASSIUM  3.9  4.7  4.1  4.1  4.7   --   4.3  4.3   < >  5.2   CR   --    --   6.55*  5.44*  5.64*   --    3.95*  3.60*   < >  2.72*   A1C   --    --   6.2*  6.1*  6.1*   --   5.6   --    < >   --     < > = values in this interval not displayed.        IMPRESSION:   Reason for surgery/procedure: vision changes related to cataract  Diagnosis/reason for consult: preoperative evaluation.    The proposed surgical procedure is considered LOW risk.    REVISED CARDIAC RISK INDEX  The patient has the following serious cardiovascular risks for perioperative complications such as (MI, PE, VFib and 3  AV Block):    INTERPRETATION: 1 risks: Class II (low risk - 0.9% complication rate)    The patient has the following additional risks for perioperative complications:  No identified additional risks      ICD-10-CM    1. Preop general physical exam Z01.818    2. Senile cataract of left eye, unspecified age-related cataract type H25.9    3. Dependence on hemodialysis (H) Z99.2    4. Peripheral vascular disease (H) I73.9    5. Essential hypertension I10        RECOMMENDATIONS:     --Approval given to proceed with proposed procedure, without further diagnostic evaluation  --meds reviewed; may take medications as prescribed on the AM of surgery with smallest amount of water necessary to take AM medications.  --Pain medications, time off from work and FMLA following surgery deferred to surgeon.     APPROVAL GIVEN to proceed with proposed procedure, without further diagnostic evaluation       Signed Electronically by: MD Shaila Ortiz MD  Internal Medicine  electronically signed    Copy of this evaluation report is provided to requesting physician.    [facility] Preop Guidelines    Revised Cardiac Risk Index

## 2018-07-09 NOTE — MR AVS SNAPSHOT
After Visit Summary   7/9/2018    Bean Croft    MRN: [id number]           Patient Information     Date Of Birth          1940        Visit Information        Provider Department      7/9/2018 4:00 PM Shaila Silverman MD [facility]        Today's Diagnoses     Preop general physical exam    -  1    Senile cataract of left eye, unspecified age-related cataract type        Peripheral vascular disease (H)        Anemia in stage 5 chronic kidney disease, not on chronic dialysis (H)          Care Instructions      Before Your Surgery      Call your surgeon if there is any change in your health. This includes signs of a cold or flu (such as a sore throat, runny nose, cough, rash or fever).    Do not smoke, drink alcohol or take over the counter medicine (unless your surgeon or primary care doctor tells you to) for the 24 hours before and after surgery.    If you take prescribed drugs: Follow your doctor s orders about which medicines to take and which to stop until after surgery.    Eating and drinking prior to surgery: follow the instructions from your surgeon    Take a shower or bath the night before surgery. Use the soap your surgeon gave you to gently clean your skin. If you do not have soap from your surgeon, use your regular soap. Do not shave or scrub the surgery site.  Wear clean pajamas and have clean sheets on your bed.     Take medications in the AM as you normally take with a small sip of water.             Follow-ups after your visit        Your next 10 appointments already scheduled     Jul 26, 2018  9:30 AM CDT   Return Visit with Yaw Baker MD   [facility]    [address] [address] [address] [phone]              Who to contact     If you have questions or need follow up information about today's clinic visit or your schedule please contact [facility]  [facility]ILO directly at [phone].  Normal or non-critical lab and imaging results will be communicated to you by MyChart, letter or phone within 4 business days after the clinic has received the results. If you do not hear from us within 7 days, please contact the clinic through Trivneth[employer] or phone. If you have a critical or abnormal lab result, we will notify you by phone as soon as possible.  Submit refill requests through Jini or call your pharmacy and they will forward the refill request to us. Please allow 3 business days for your refill to be completed.          Additional Information About Your Visit        [employer] Information     Jini gives you secure access to your electronic health record. If you see a primary care provider, you can also send messages to your care team and make appointments. If you have questions, please call your primary care clinic.  If you do not have a primary care provider, please call [phone] and they will assist you.        Care EveryWhere ID     This is your Care EveryWhere ID. This could be used by other organizations to access your [place] medical records  [id number]        Your Vitals Were     Pulse Temperature Respirations Pulse Oximetry BMI (Body Mass Index)       57 97.6  F (36.4  C) (Oral) 17 97% 23.23 kg/m2        Blood Pressure from Last 3 Encounters:   07/09/18 122/48   06/11/18 124/46   02/22/18 128/50    Weight from Last 3 Encounters:   07/09/18 139 lb 9.6 oz (63.3 kg)   06/11/18 138 lb 1.6 oz (62.6 kg)   02/22/18 142 lb (64.4 kg)              Today, you had the following     No orders found for display       Primary Care Provider Office Phone # Fax #    Shaila Silverman [phone]       [address] [place] [place] [address]        Equal Access to Services     ADELA COTO : Hadii sylwia mccurdyo Sogoran, waaxda luqadaha, qaybta kaalmada melaneiyadaryl, madison richards. So [facility] [phone].    ATENCIÓN: Si james  español, tiene a tidwell disposición servicios gratuitos de asistencia lingüística. Amelia duque [phone].    We comply with applicable federal civil rights laws and [place] laws. We do not discriminate on the basis of race, color, national origin, age, disability, sex, sexual orientation, or gender identity.            Thank you!     Thank you for choosing [facility]  for your care. Our goal is always to provide you with excellent care. Hearing back from our patients is one way we can continue to improve our services. Please take a few minutes to complete the written survey that you may receive in the mail after your visit with us. Thank you!             Your Updated Medication List - Protect others around you: Learn how to safely use, store and throw away your medicines at www.disposemymeds.org.          This list is accurate as of 7/9/18  5:03 PM.  Always use your most recent med list.                   Brand Name Dispense Instructions for use Diagnosis    allopurinol 100 MG tablet    ZYLOPRIM    90 tablet    Take 1 tablet (100 mg) by mouth daily    Acute gouty arthropathy       amLODIPine 10 MG tablet    NORVASC    90 tablet    Take 1 tablet (10 mg) by mouth daily    Benign hypertension with chronic kidney disease, stage IV (H)       aspirin 81 MG EC tablet      Take 81 mg by mouth daily        DIALYVITE 800/ULTRA D Tabs      Take 1 tablet by mouth daily        docusate sodium 100 MG capsule    COLACE     Take 100 mg by mouth 2 times daily        donepezil 5 MG tablet    ARIcept     Take 1 tablet by mouth daily        furosemide 80 MG tablet    LASIX    60 tablet    Take 1 tablet (80 mg) by mouth daily On non-dialysis days    ESRD (end stage renal disease) on dialysis (H), Edema, unspecified type       hydrALAZINE 100 MG Tabs tablet    APRESOLINE    270 tablet    TAKE 1 TABLET(100 MG) BY MOUTH THREE TIMES DAILY    Benign hypertension with chronic kidney disease, stage IV (H)       metoprolol  succinate 50 MG 24 hr tablet    TOPROL-XL    30 tablet    Take 1 tablet (50 mg) by mouth daily    Coronary artery disease involving native coronary artery of native heart without angina pectoris, Essential hypertension with goal blood pressure less than 140/90       omeprazole 20 MG CR capsule    priLOSEC    90 capsule    TAKE 1 CAPSULE(20 MG) BY MOUTH DAILY    Heartburn       simvastatin 40 MG tablet    ZOCOR    90 tablet    TAKE 1 TABLET(40 MG) BY MOUTH AT BEDTIME    Hyperlipidemia LDL goal <100

## 2018-07-26 NOTE — PROGRESS NOTES
Service Date: 07/26/2018      HISTORY OF PRESENT ILLNESS:  Bean Croft, a 78-year-old man with coronary artery disease,  peripheral vascular disease, diabetes mellitus, dyslipidemia, hypertension, and end-stage renal disease on dialysis, was seen today at your request for followup.  The patient sustained an anterior wall infarction in 1996 while traveling to [place].  He was given intravenous streptokinase and successfully reperfused.  He returned home and underwent diagnostic coronary angiography and then angioplasty of the mid-LAD.  At the time of his angiogram in 1996, there was no significant narrowing involving the left main, nondominant circumflex, or dominant right coronary.  A dobutamine stress echo in 2015 showed no ischemia with normal ejection fraction.      Since I last saw the patient, he remains entirely free of symptoms.  He denies syncope, chest pain, palpitation or focal neurologic deficit.      PAST MEDICAL HISTORY:   1.  Diabetes mellitus.   a.  End-stage renal disease - currently undergoing dialysis 2 times per week.   b.  Retinopathy - has been treated with laser treatment.   c.  History of neuropathy.   2.  Hypertension.   3.  Dyslipidemia.   4.  Atherosclerotic peripheral vascular disease:   a.  History of claudication with known narrowings in the femoral or popliteal arteries.  Followed clinically.   b.  History of carotid enterectomy - ultrasound studies in the past showing no restenosis.   5.  Dyslipidemia.   6.  Sensorineural hearing loss.      PHYSICAL EXAMINATION:   GENERAL:  Exam today demonstrates a very pleasant, cooperative 78-year-old man.   VITAL SIGNS:  His blood pressure is 108/50, his heart rate is 61.  His height is 1.7 meters, his weight is 63.5 kg.  His BMI is 23.4.   LUNGS:  Clear to percussion and auscultation.   CARDIOVASCULAR:  Normal S1 with a normal S2.  There is no S3.  There is no murmur, rub or click.      LABORATORY STUDIES:  His most recent LDL  cholesterol was 44.      ASSESSMENT:  Mr. Klein is asymptomatic on guideline-directed medical therapy with optimal systolic blood pressure and LDL cholesterol levels.  He has been stable for many years and at this point I do not believe we are adding substantially to the patient's care.  I believe he can be seen on a p.r.n. basis.      RECOMMENDATIONS:   1.  Continue present medical therapy.   2.  Follow up p.r.n.      We have appreciated the opportunity to be involved in the care of your patient, Mr. Edmond Klein.      cc:   [place] [address]         CONSTANZA MONTANEZ MD             D: 2018   T: 2018   MT: MELISSA      Name:     EDMOND KLEIN   MRN:      [id number]        Account:      [id number]   :      1940           Service Date: 2018      Document: [id number]

## 2018-07-26 NOTE — PROGRESS NOTES
HISTORY OF PRESENT ILLNESS:  No chest pain dialysis 2x/week. SBP excellent.     Orders this Visit:  No orders of the defined types were placed in this encounter.    No orders of the defined types were placed in this encounter.    There are no discontinued medications.    Encounter Diagnoses   Name Primary?     Coronary artery disease involving native coronary artery of native heart without angina pectoris      Essential hypertension with goal blood pressure less than 140/90        CURRENT MEDICATIONS:  Current Outpatient Prescriptions   Medication Sig Dispense Refill     allopurinol (ZYLOPRIM) 100 MG tablet Take 1 tablet (100 mg) by mouth daily 90 tablet 3     amLODIPine (NORVASC) 10 MG tablet Take 1 tablet (10 mg) by mouth daily 90 tablet 3     aspirin 81 MG EC tablet Take 81 mg by mouth daily       docusate sodium (COLACE) 100 MG capsule Take 100 mg by mouth 2 times daily       donepezil (ARICEPT) 5 MG tablet Take 1 tablet by mouth daily  5     furosemide (LASIX) 80 MG tablet Take 1 tablet (80 mg) by mouth daily On non-dialysis days 60 tablet 3     hydrALAZINE (APRESOLINE) 100 MG TABS tablet TAKE 1 TABLET(100 MG) BY MOUTH THREE TIMES DAILY 270 tablet 3     metoprolol (TOPROL-XL) 50 MG 24 hr tablet Take 1 tablet (50 mg) by mouth daily 30 tablet 11     Multiple Vitamins-Minerals (DIALYVITE 800/ULTRA D) TABS Take 1 tablet by mouth daily  11     omeprazole (PRILOSEC) 20 MG CR capsule TAKE 1 CAPSULE(20 MG) BY MOUTH DAILY 90 capsule 1     simvastatin (ZOCOR) 40 MG tablet TAKE 1 TABLET(40 MG) BY MOUTH AT BEDTIME 90 tablet 1       ALLERGIES     Allergies   Allergen Reactions     Ace Inhibitors Cough     Penicillins      Other reaction(s): *Unknown  Pt was given it for rheumatic fever. MD told him to never take it again per pt statement. ? Allergy.       PAST MEDICAL, SURGICAL, FAMILY, SOCIAL HISTORY:  History was reviewed and updated as needed, see medical record.    Review of Systems:  A 12-point review of systems was  "completed, see medical record for detailed review of systems information.    Physical Exam:  Vitals: /50 (BP Location: Right arm, Patient Position: Sitting, Cuff Size: Adult Regular)  Pulse 61  Ht 1.651 m (5' 5\")  Wt 63.5 kg (140 lb)  SpO2 96%  BMI 23.3 kg/m2    Constitutional:  cooperative, alert and oriented, well developed, well nourished, in no acute distress        Skin:  warm and dry to the touch, no apparent skin lesions or masses noted        Head:  normocephalic, no masses or lesions        Eyes:  pupils equal and round, conjunctivae and lids unremarkable, sclera white, no xanthalasma, EOMS intact, no nystagmus        ENT:  no pallor or cyanosis, dentition good        Neck:  carotid pulses are full and equal bilaterally, JVP normal, no carotid bruit        Chest:  normal breath sounds, clear to auscultation, normal A-P diameter, normal symmetry, normal respiratory excursion, no use of accessory muscles        Cardiac: regular rhythm, normal S1/S2, no S3 or S4, apical impulse not displaced, no murmurs, gallops or rubs                  Abdomen:  abdomen soft, non-tender, BS normoactive, no mass, no HSM, no bruits        Vascular: pulses full and equal, no bruits auscultated                                      Extremities and Back:  no deformities, clubbing, cyanosis, erythema observed        Neurological:  no gross motor deficits        ASSESSMENT:  Stable.        RECOMMENDATIONS:  Fu PRN      Recent Lab Results:  LIPID RESULTS:  Lab Results   Component Value Date    CHOL 102 03/01/2018    HDL 43 03/01/2018    LDL 44 03/01/2018    TRIG 75 03/01/2018    CHOLHDLRATIO 2.4 12/27/2014       LIVER ENZYME RESULTS:  Lab Results   Component Value Date    AST 11 (L) 03/27/2018    ALT 10 03/27/2018       CBC RESULTS:  Lab Results   Component Value Date    WBC 10.2 11/16/2016    RBC 2.04 (L) 11/16/2016    HGB 10.6 (L) 05/18/2017    HCT 20.7 (L) 11/16/2016     (H) 11/16/2016    MCH 32.8 11/16/2016    " MCHC 32.4 11/16/2016    RDW 14.7 11/16/2016     11/16/2016       BMP RESULTS:  Lab Results   Component Value Date     03/01/2018    POTASSIUM 3.9 05/29/2018    CHLORIDE 99 03/01/2018    CO2 25 03/01/2018    ANIONGAP 12 03/01/2018     (H) 05/29/2018    BUN 51 (H) 03/01/2018    CR 6.55 (H) 03/27/2018    GFRESTIMATED 10 (L) 03/01/2018    GFRESTBLACK 12 (L) 03/01/2018    TAMMY 9.0 03/01/2018        A1C RESULTS:  Lab Results   Component Value Date    A1C 6.2 (H) 03/27/2018       INR RESULTS:  Lab Results   Component Value Date    INR 1.00 11/16/2016    INR 0.99 05/11/2015       We greatly appreciate the opportunity to be involved in the care of your patient, Bean Croft.    Sincerely,  Yaw Baker MD        Yaw Baker MD  [address]  [place] [address]

## 2018-07-26 NOTE — MR AVS SNAPSHOT
After Visit Summary   7/26/2018    Bean Croft    MRN: [id number]           Patient Information     Date Of Birth          1940        Visit Information        Provider Department      7/26/2018 9:30 AM Yaw Baker MD [facility]        Today's Diagnoses     Coronary artery disease involving native coronary artery of native heart without angina pectoris        Essential hypertension with goal blood pressure less than 140/90           Follow-ups after your visit        Who to contact     If you have questions or need follow up information about today's clinic visit or your schedule please contact [facility] directly at [phone].  Normal or non-critical lab and imaging results will be communicated to you by MyChart, letter or phone within 4 business days after the clinic has received the results. If you do not hear from us within 7 days, please contact the clinic through [employer] or phone. If you have a critical or abnormal lab result, we will notify you by phone as soon as possible.  Submit refill requests through [employer] or call your pharmacy and they will forward the refill request to us. Please allow 3 business days for your refill to be completed.          Additional Information About Your Visit        MyChart Information     [employer] gives you secure access to your electronic health record. If you see a primary care provider, you can also send messages to your care team and make appointments. If you have questions, please call your primary care clinic.  If you do not have a primary care provider, please call [phone] and they will assist you.        Care EveryWhere ID     This is your Care EveryWhere ID. This could be used by other organizations to access your [place] medical records  [id number]        Your Vitals Were     Pulse Height Pulse Oximetry BMI (Body Mass Index)           "61 1.651 m (5' 5\") 96% 23.3 kg/m2         Blood Pressure from Last 3 Encounters:   07/26/18 108/50   07/09/18 122/48   06/11/18 124/46    Weight from Last 3 Encounters:   07/26/18 63.5 kg (140 lb)   07/09/18 63.3 kg (139 lb 9.6 oz)   06/11/18 62.6 kg (138 lb 1.6 oz)              We Performed the Following     Follow-Up with Cardiologist        Primary Care Provider Office Phone # Fax #    Shaila Silverman [phone] [address] [facility] [address]        Equal Access to Services     ADELA COTO : Hadii aad ku hadasho Solakeali, waaxda luqadaha, qaybta kaalmada adeegyada, waxay candicein andrea diaz . So [facility] [phone].    ATENCIÓN: Si habla español, tiene a tidwell disposición servicios gratuitos de asistencia lingüística. [facility] [phone].    We comply with applicable federal civil rights laws and [place] laws. We do not discriminate on the basis of race, color, national origin, age, disability, sex, sexual orientation, or gender identity.            Thank you!     Thank you for choosing [facility]  for your care. Our goal is always to provide you with excellent care. Hearing back from our patients is one way we can continue to improve our services. Please take a few minutes to complete the written survey that you may receive in the mail after your visit with us. Thank you!             Your Updated Medication List - Protect others around you: Learn how to safely use, store and throw away your medicines at www.disposemymeds.org.          This list is accurate as of 7/26/18  9:48 AM.  Always use your most recent med list.                   Brand Name Dispense Instructions for use Diagnosis    allopurinol 100 MG tablet    ZYLOPRIM    90 tablet    Take 1 tablet (100 mg) by mouth daily    Acute gouty arthropathy       amLODIPine 10 MG tablet    NORVASC    90 tablet    Take 1 tablet (10 mg) by mouth daily    Benign " hypertension with chronic kidney disease, stage IV (H)       aspirin 81 MG EC tablet      Take 81 mg by mouth daily        DIALYVITE 800/ULTRA D Tabs      Take 1 tablet by mouth daily        docusate sodium 100 MG capsule    COLACE     Take 100 mg by mouth 2 times daily        donepezil 5 MG tablet    ARIcept     Take 1 tablet by mouth daily        furosemide 80 MG tablet    LASIX    60 tablet    Take 1 tablet (80 mg) by mouth daily On non-dialysis days    ESRD (end stage renal disease) on dialysis (H), Edema, unspecified type       hydrALAZINE 100 MG Tabs tablet    APRESOLINE    270 tablet    TAKE 1 TABLET(100 MG) BY MOUTH THREE TIMES DAILY    Benign hypertension with chronic kidney disease, stage IV (H)       metoprolol succinate 50 MG 24 hr tablet    TOPROL-XL    30 tablet    Take 1 tablet (50 mg) by mouth daily    Coronary artery disease involving native coronary artery of native heart without angina pectoris, Essential hypertension with goal blood pressure less than 140/90       omeprazole 20 MG CR capsule    priLOSEC    90 capsule    TAKE 1 CAPSULE(20 MG) BY MOUTH DAILY    Heartburn       simvastatin 40 MG tablet    ZOCOR    90 tablet    TAKE 1 TABLET(40 MG) BY MOUTH AT BEDTIME    Hyperlipidemia LDL goal <100

## 2018-07-26 NOTE — LETTER
7/26/2018    Shaila Silverman MD  [address] [place] [address]    RE: Bean Croft       Dear Colleague,    I had the pleasure of seeing Bean Croft in the [facility].    HISTORY OF PRESENT ILLNESS:  No chest pain dialysis 2x/week. SBP excellent.     Orders this Visit:  No orders of the defined types were placed in this encounter.    No orders of the defined types were placed in this encounter.    There are no discontinued medications.    Encounter Diagnoses   Name Primary?     Coronary artery disease involving native coronary artery of native heart without angina pectoris      Essential hypertension with goal blood pressure less than 140/90        CURRENT MEDICATIONS:  Current Outpatient Prescriptions   Medication Sig Dispense Refill     allopurinol (ZYLOPRIM) 100 MG tablet Take 1 tablet (100 mg) by mouth daily 90 tablet 3     amLODIPine (NORVASC) 10 MG tablet Take 1 tablet (10 mg) by mouth daily 90 tablet 3     aspirin 81 MG EC tablet Take 81 mg by mouth daily       docusate sodium (COLACE) 100 MG capsule Take 100 mg by mouth 2 times daily       donepezil (ARICEPT) 5 MG tablet Take 1 tablet by mouth daily  5     furosemide (LASIX) 80 MG tablet Take 1 tablet (80 mg) by mouth daily On non-dialysis days 60 tablet 3     hydrALAZINE (APRESOLINE) 100 MG TABS tablet TAKE 1 TABLET(100 MG) BY MOUTH THREE TIMES DAILY 270 tablet 3     metoprolol (TOPROL-XL) 50 MG 24 hr tablet Take 1 tablet (50 mg) by mouth daily 30 tablet 11     Multiple Vitamins-Minerals (DIALYVITE 800/ULTRA D) TABS Take 1 tablet by mouth daily  11     omeprazole (PRILOSEC) 20 MG CR capsule TAKE 1 CAPSULE(20 MG) BY MOUTH DAILY 90 capsule 1     simvastatin (ZOCOR) 40 MG tablet TAKE 1 TABLET(40 MG) BY MOUTH AT BEDTIME 90 tablet 1       ALLERGIES     Allergies   Allergen Reactions     Ace Inhibitors Cough     Penicillins      Other reaction(s): *Unknown  Pt was given it for rheumatic fever. MD told him  "to never take it again per pt statement. ? Allergy.       PAST MEDICAL, SURGICAL, FAMILY, SOCIAL HISTORY:  History was reviewed and updated as needed, see medical record.    Review of Systems:  A 12-point review of systems was completed, see medical record for detailed review of systems information.    Physical Exam:  Vitals: /50 (BP Location: Right arm, Patient Position: Sitting, Cuff Size: Adult Regular)  Pulse 61  Ht 1.651 m (5' 5\")  Wt 63.5 kg (140 lb)  SpO2 96%  BMI 23.3 kg/m2    Constitutional:  cooperative, alert and oriented, well developed, well nourished, in no acute distress        Skin:  warm and dry to the touch, no apparent skin lesions or masses noted        Head:  normocephalic, no masses or lesions        Eyes:  pupils equal and round, conjunctivae and lids unremarkable, sclera white, no xanthalasma, EOMS intact, no nystagmus        ENT:  no pallor or cyanosis, dentition good        Neck:  carotid pulses are full and equal bilaterally, JVP normal, no carotid bruit        Chest:  normal breath sounds, clear to auscultation, normal A-P diameter, normal symmetry, normal respiratory excursion, no use of accessory muscles        Cardiac: regular rhythm, normal S1/S2, no S3 or S4, apical impulse not displaced, no murmurs, gallops or rubs                  Abdomen:  abdomen soft, non-tender, BS normoactive, no mass, no HSM, no bruits        Vascular: pulses full and equal, no bruits auscultated                                      Extremities and Back:  no deformities, clubbing, cyanosis, erythema observed        Neurological:  no gross motor deficits        ASSESSMENT:  Stable.        RECOMMENDATIONS:  Fu PRN      Recent Lab Results:  LIPID RESULTS:  Lab Results   Component Value Date    CHOL 102 03/01/2018    HDL 43 03/01/2018    LDL 44 03/01/2018    TRIG 75 03/01/2018    CHOLHDLRATIO 2.4 12/27/2014       LIVER ENZYME RESULTS:  Lab Results   Component Value Date    AST 11 (L) 03/27/2018    ALT " 10 03/27/2018       CBC RESULTS:  Lab Results   Component Value Date    WBC 10.2 11/16/2016    RBC 2.04 (L) 11/16/2016    HGB 10.6 (L) 05/18/2017    HCT 20.7 (L) 11/16/2016     (H) 11/16/2016    MCH 32.8 11/16/2016    MCHC 32.4 11/16/2016    RDW 14.7 11/16/2016     11/16/2016       BMP RESULTS:  Lab Results   Component Value Date     03/01/2018    POTASSIUM 3.9 05/29/2018    CHLORIDE 99 03/01/2018    CO2 25 03/01/2018    ANIONGAP 12 03/01/2018     (H) 05/29/2018    BUN 51 (H) 03/01/2018    CR 6.55 (H) 03/27/2018    GFRESTIMATED 10 (L) 03/01/2018    GFRESTBLACK 12 (L) 03/01/2018    TAMMY 9.0 03/01/2018        A1C RESULTS:  Lab Results   Component Value Date    A1C 6.2 (H) 03/27/2018       INR RESULTS:  Lab Results   Component Value Date    INR 1.00 11/16/2016    INR 0.99 05/11/2015       We greatly appreciate the opportunity to be involved in the care of your patient, Bean Croft.    Sincerely,  Yaw Baker MD        Yaw Baker MD  [address] [address]                                                                       Service Date: 07/26/2018      HISTORY OF PRESENT ILLNESS:  Bean Croft, a 78-year-old man with coronary artery disease,  peripheral vascular disease, diabetes mellitus, dyslipidemia, hypertension, and end-stage renal disease on dialysis, was seen today at your request for followup.  The patient sustained an anterior wall infarction in 1996 while traveling to [place].  He was given intravenous streptokinase and successfully reperfused.  He returned home and underwent diagnostic coronary angiography and then angioplasty of the mid-LAD.  At the time of his angiogram in 1996, there was no significant narrowing involving the left main, nondominant circumflex, or dominant right coronary.  A dobutamine stress echo in 2015 showed no ischemia with normal ejection fraction.      Since I last saw the patient, he remains entirely  free of symptoms.  He denies syncope, chest pain, palpitation or focal neurologic deficit.      PAST MEDICAL HISTORY:   1.  Diabetes mellitus.   a.  End-stage renal disease - currently undergoing dialysis 2 times per week.   b.  Retinopathy - has been treated with laser treatment.   c.  History of neuropathy.   2.  Hypertension.   3.  Dyslipidemia.   4.  Atherosclerotic peripheral vascular disease:   a.  History of claudication with known narrowings in the femoral or popliteal arteries.  Followed clinically.   b.  History of carotid enterectomy - ultrasound studies in the past showing no restenosis.   5.  Dyslipidemia.   6.  Sensorineural hearing loss.      PHYSICAL EXAMINATION:   GENERAL:  Exam today demonstrates a very pleasant, cooperative 78-year-old man.   VITAL SIGNS:  His blood pressure is 108/50, his heart rate is 61.  His height is 1.7 meters, his weight is 63.5 kg.  His BMI is 23.4.   LUNGS:  Clear to percussion and auscultation.   CARDIOVASCULAR:  Normal S1 with a normal S2.  There is no S3.  There is no murmur, rub or click.      LABORATORY STUDIES:  His most recent LDL cholesterol was 44.      ASSESSMENT:  Mr. Klein is asymptomatic on guideline-directed medical therapy with optimal systolic blood pressure and LDL cholesterol levels.  He has been stable for many years and at this point I do not believe we are adding substantially to the patient's care.  I believe he can be seen on a p.r.n. basis.      RECOMMENDATIONS:   1.  Continue present medical therapy.   2.  Follow up p.r.n.      We have appreciated the opportunity to be involved in the care of your patient, Mr. Edmond Klein.      cc:   Shaila Silverman MD   [address]  [address]         CONSTANZA MONTANEZ MD             D: 2018   T: 2018   MT: MELISSA      Name:     EDMOND KLEIN   MRN:      [id number]        Account:      [id number]   :      1940            Service Date: 07/26/2018      Document: [id number]        Thank you for allowing me to participate in the care of your patient.    Sincerely,     Yaw Baker MD     [facility]

## 2018-08-10 PROBLEM — J96.00 ACUTE RESPIRATORY FAILURE (H): Status: ACTIVE | Noted: 2018-01-01

## 2018-08-10 NOTE — IP AVS SNAPSHOT
` ` Patient Information     Patient Name Sex     Bean Croft ([id number]) Male 1940       Room Bed    8800 8800-01      Patient Demographics     Address Phone E-mail Address    [address]   [place] [address] [phone] (Home)  none (Work)  none (Mobile) [email or web address]      Patient Ethnicity & Race     Ethnic Group Patient Race    American White      Emergency Contact(s)     Name Relation Home Work Mobile    Carmina Croft Spouse [phone] none none    Natalia Croft Daughter none none [phone]    Rene Croft Son none none [phone]      Documents on File        Status Date Received Description       Documents for the Patient    Consent for Services - Hospital/Clinic Received () 14     Consent for EHR Access Received 14     Privacy Notice - [place] Received 14     Insurance Card Received () 14     External Medication Information Consent Accepted 14     Patient ID Received () 12/01/15 exp 17    [facility] Specified Other       Consent for Services - Hospital/Clinic Received () 14     Insurance Card Received () 08/19/15     Consent for Services - Hospital/Clinic Received () 09/29/15     Consent for Services/Privacy Notice - Hospital/Clinic Received () 16     Insurance Card Received 18 BCBS -no     Consent to Communicate Received 16     HIM BERTA Authorization - File Only   [facility] 2016    Immunization Record  16 DAVITA VACCINATION REPORT    Consent for Services/Privacy Notice - Hospital/Clinic Received () 17     Patient ID Received 18 [place] DL EX 21    Care Everywhere Prospective Auth Received 18     Consent for Services/Privacy Notice - Hospital/Clinic Received 18     HIM BERTA Authorization  18     Consent for Services - Hospital and Clinic Received 18     HIE Auth Received  05/11/18     Insurance Card Received 07/26/18 **MEDICARE       Documents for the Encounter    CMS IM for Patient Signature Received 08/10/18     CMS IM for Patient Signature Received 08/13/18   2MM      Admission Information     Attending Provider Admitting Provider Admission Type Admission Date/Time    Tom Hill MD Cho, Roy Joseph, MD Emergency 08/10/18  1532    Discharge Date Hospital Service Auth/Cert Status Service Area     Hospitalist [facility]    Unit Room/Bed Admission Status        88 ONCOLOGY 8800/8800-01 Admission (Confirmed)       Admission     Complaint    Acute respiratory failure (H)      Hospital Account     Name Acct ID Class Status Primary Coverage    Bean Croft [id number] Inpatient Open MEDICARE - MEDICARE            Guarantor Account (for Hospital Account #[id number])     Name Relation to Pt Service Area Active? Acct Type    Bean Croft  FCS Yes Personal/Family    Address Phone          [address] [place] [address] [phone](H)  none(O)              Coverage Information (for Hospital Account #[id number])     1. MEDICARE/MEDICARE     F/O Payor/Plan Precert #    MEDICARE/MEDICARE     Subscriber Subscriber #    Bean Croft [id number]    Address Phone    ATTN CLAIMS  [address]  [facility] [place] [address] [phone]          2. BCBS/BCBS COMPREHENSIVE CARE SERV/BLUE LINK     F/O Payor/Plan Precert #    BCBS/BCBS COMPREHENSIVE CARE SERV/BLUE LINK     Subscriber Subscriber #    Bean Croft [id number]    Address Phone    [address] [address]  [place], [place] [id number] [phone]

## 2018-08-10 NOTE — CONSULTS
[facility]   CRITICAL CARE ADMISSION/CONSULTATION    Bean Croft MRN: [id number]  1940  Date of Admission:8/10/2018          HPI   Bean Croft IS a 78 year old male admitted on 8/10/2018 with significant history for ESRD on HD, CHF, CAD, Dm2, GERD who presents with acute onset dyspnea.   - He was intubated in the field due to respiratory failure. History taken from the chart.   - Apparently is HD dependent however does make urine. There was some reduction in his lasix a few days ago. Today he complained of increasing respiratory distress.  - In the field, he was intubated however the tube was subglottic. Additionally noted hypotension.   - In the ED, the ETT was repositioned and BP improved. He was found to have Hb 6.1 and guiac positive. Abd was soft, nontender. I/O was placed for dopamine infusion which was subsequently weaned off after ETT repositioning.            Past Medical History:      Past Medical History:   Diagnosis Date     Anemia     unspecified     Arthritis     generalized     Carotid artery stenosis      CHF (congestive heart failure) (H)      Chronic kidney disease     Stage IV     Coronary artery disease      Dementia      Diabetes mellitus (H)     Non-insulin Dependent     Diabetic retinopathy (H)      Diverticulitis 7/2016     DM (diabetes mellitus screen)      Enlarged prostate      Gastro-oesophageal reflux disease      Gout      Hearing loss      History of left-sided carotid endarterectomy 4/19/16     Hyperlipidaemia      Hypertension      Low back pain      Peripheral vascular disease (H)      Pulmonary nodule      Seborrheic keratosis      Sleep apnea     stated by patient - not official diagnosis             Past Surgical History:      Past Surgical History:   Procedure Laterality Date     CORONARY ANGIOGRAPHY ADULT ORDER  1996    ???CLAIRE to mid LAD, initial obstruction 95%     CREATE FISTULA ARTERIOVENOUS UPPER EXTREMITY Left 5/31/2016    Procedure: CREATE  FISTULA ARTERIOVENOUS UPPER EXTREMITY;  Surgeon: Batsheva Wright MD;  Location: [facility] OR     ENDARTERECTOMY CAROTID Left 4/19/2016    Procedure: ENDARTERECTOMY CAROTID;  Surgeon: Batsheva Wright MD;  Location: [facility] OR     REPAIR FISTULA ARTERIOVENOUS UPPER EXTREMITY Left 11/15/2016    Procedure: REPAIR FISTULA ARTERIOVENOUS UPPER EXTREMITY;  Surgeon: Tae Hendrix MD;  Location: [facility] OR     TONSILLECTOMY, ADENOIDECTOMY, COMBINED              Social History:     Social History   Substance Use Topics     Smoking status: Former Smoker     Packs/day: 0.50     Types: Cigarettes     Start date: 1/1/1956     Quit date: 1/25/2016     Smokeless tobacco: Never Used      Comment: a pack every 4 days     Alcohol use No            Family History:     Family History   Problem Relation Age of Onset     C.A.D. Father 52     MI     C.A.D. Sister      Other - See Comments Mother 92             Allergies:   Please see allergy list which was reviewed this admission.         Medications:       sodium chloride 0.9%  250 mL Intravenous Once in dialysis     sodium chloride 0.9%  300 mL Hemodialysis Machine Once     epoetin juaquin (EPOGEN,PROCRIT) inj ESRD  6,000 Units Intravenous Once in dialysis     - MEDICATION INSTRUCTIONS -   Does not apply Once     sodium chloride 0.9%         Review of Systems:   Unable to assess due to critical illness         Physical Examination:   Temp:  [94.1  F (34.5  C)-96.4  F (35.8  C)] 95  F (35  C)  Pulse:  [93] 93  Heart Rate:  [87-96] 96  Resp:  [13-27] 20  BP: ()/(53-67) 94/58  FiO2 (%):  [40 %-100 %] 50 %  SpO2:  [90 %-100 %] 98 %    Intake/Output Summary (Last 24 hours) at 08/10/18 1839  Last data filed at 08/10/18 1700   Gross per 24 hour   Intake              100 ml   Output               30 ml   Net               70 ml     Wt Readings from Last 4 Encounters:   08/10/18 64 kg (141 lb 1.5 oz)   07/26/18 63.5 kg (140 lb)   07/09/18 63.3 kg (139 lb 9.6 oz)   06/11/18 62.6 kg (138  lb 1.6 oz)     BP - Mean:  [70-88] 82  Ventilation Mode: CMV/AC  (Continuous Mandatory Ventilation/ Assist Control)  FiO2 (%): 50 %  Rate Set (breaths/minute): 14 breaths/min  Tidal Volume Set (mL): 500 mL  PEEP (cm H2O): 10 cmH2O  Oxygen Concentration (%): 40 %  Resp: 20  No lab results found in last 7 days.    GEN: intubated, sedated  HEENT: head ncat, sclera anicteric, OP patent, trachea midline   PULM: unlabored synchronous with vent, clear anteriorly    CV/COR: RRR S1S2 no gallop,  No rub, no murmur  ABD: soft nontender, hypoactive bowel sounds, no mass  EXT:  Edema   warm  NEURO: grossly intact  SKIN: no obvious rash    Bedside US: IVC is collapsed. LV is hyperdynamic. No pericardial effusion. No pleural effusion.      Assessment and plan :     Neurology/Psychiatry/Pain/Sedation:   1. Sedation for vent synchrony. Keep RASS -2 to -4 using propofol and prn fentanyl    Cardiovascular/Hemodynamics/Pulmonary/Ventilator Management/Renal/Fluids/Electrolytes/ID/Endo/Heme-Onc  1. Acute respiratory failure presumed 2/2 volume overload with acute vs chronic anemia in setting of CHF and ESRD on HD. Hemodynamically stable off of pressors. Lactic acid 1.8. Easy to oxygenate and ventilate. Presumably, the cause for his respiratory failure is volume overload and will get emergent HD tonight. However, his hypotension, bedside US assessment and Hb 6.1 seems to be antagonistic to hypervolemic state. Given positive guaiac, we will get CT a/p tonight to evaluate and Hb q8hrs tonight. Other things to consider is acute coronary event, sepsis, adrenal insufficiency from etomidate and/or non-cardiogenic pulm edema (i.e. ALI, alveolar hemorrhage).   - for completeness, will obtain TTE, FT4/TSH, TnTq8, and random cortisol level.   2. ESRD. HD per renal  3. ICU glucose protocol  4. Mechanical DVT ppx.  5. Empiric broad spectrum abx pending cultures    GI/Nutrition:   1. NPO. PPI for ppx    Disposition/Code Status/Other  1. Critically  ill. Supportive care  2. Code: Full     ICU Prophylaxis:   1. DVT: mechanical  2. VAP: HOB 30 degrees, chlorhexidine rinse  3. Stress Ulcer: PPI/H2 blocker  4. Restraints: Nonviolent soft two point restraints required and necessary for patient safety and continued cares and good effect as patient continues to pull at necessary lines, tubes despite education and distraction. Will readdress daily.   5. IV Access - central access required and necessary for continued patient cares  6. Feeding - npo    I have personally reviewed the daily labs, imaging studies, cultures and discussed the case with referring physician and consulting physicians.     This patient is critically ill and I have provided 30 minutes of critical care time (excluding procedures) on August 10, 2018.     Tom Hill MD   Critical Care Staff  [phone]         Data:   All data and imaging reviewed     ROUTINE ICU LABS (Last four results)  CMP  Recent Labs  Lab 08/10/18  1539      POTASSIUM 4.2   CHLORIDE 100   CO2 20   ANIONGAP 18*   *   *   CR 6.88*   GFRESTIMATED 8*   GFRESTBLACK 9*   TAMMY 8.0*   PROTTOTAL 5.9*   ALBUMIN 3.0*   BILITOTAL 0.4   ALKPHOS 58   AST 41   ALT 16     CBC  Recent Labs  Lab 08/10/18  1539   WBC 16.8*   RBC 1.82*   HGB 6.1*   HCT 19.0*   *   MCH 33.5*   MCHC 32.1   RDW 15.1*        INRNo lab results found in last 7 days.  Arterial Blood GasNo lab results found in last 7 days.    All cultures:  No results for input(s): CULT in the last 168 hours.  Recent Results (from the past 24 hour(s))   XR Chest Port 1 View    Narrative    XR CHEST PORT 1 VW 8/10/2018 3:48 PM    COMPARISON: 10/27/2016    HISTORY: Respiratory distress, endotracheal tube positioning.      Impression    IMPRESSION: Endotracheal tube tip approximately 4 cm above the amina.  Patchy opacities over both lungs, RIGHT worse than LEFT, edema versus  atypical infection. No pneumothorax seen on either side.    REY PRUITT MD

## 2018-08-10 NOTE — LETTER
Transition Communication Hand-off for Care Transitions to Next Level of Care Provider    Name: Bean Croft  : 1940  MRN #: [id number]  Primary Care Provider: Shaila Silverman     Primary Clinic: [address]  [facility] [address]     Reason for Hospitalization:  CHF exacerbation (H) [I50.9]  Admit Date/Time: 8/10/2018  3:32 PM  Discharge Date: 2018    Payor Source: Payor: MEDICARE / Plan: MEDICARE / Product Type: Medicare /     Readmission Assessment Measure (JUDE) Risk Score/category: Average       Reason for Communication Hand-off Referral: Other Patient discharging to [facility] bed with [facility]        Racquel Barber

## 2018-08-10 NOTE — ED PROVIDER NOTES
History     Chief Complaint:  Respiratory Distress    HPI     The patient presents unresponsive, so history is provided by EMS.     Bean Croft is a 78 year old male with a history of CHF, on dialysis (last Tuesday, performed twice per week, on Lasix), who presents to the emergency department for evaluation of respiratory distress. Per EMS, the patient recently had his diuretics decreased 2 days ago by PCP and has since been experiencing fatigue and weakness. EMS was contacted by family for the patient's respiratory distress while on the toilet; EMS states he was found pale and with loose stools, has had loose stools for the past week. EMS indicates the patient was unable to stay conscious during transport and had audible wheezes with breathing. He was moving all 4 limbs freely at this point. They intubated at around 1500 and later around 1520 noted they lost left-sided breath sounds, so they ely the ET tube back; he was given etomidate and succinate prior to intubation. Patient was given dopamine via EMS without improvement of blood pressure as he was noted to have systolic pressures in the 70s and 80s. Patient was also given 2 sublingual Nitrostat, 7.5 mg morphine, and 7.5 mg Versed, last Versed given around 1527.  IO was placed by EMS in left proximal tibia.    The patient's wife presents to the ED and provides further history.    She indicates the patient is on Lasix, continues to make urine, and sees Dr. Zamudio of nephrology.  Denies history of chest pain or known GI bleed.  Patient takes aspirin daily.    Allergies:  Ace Inhibitors  Penicillins     Medications:    Zyloprim  Norvasc  Aspirin  Colace  Aricept  Lasix  Apresoline  Toprol  Dialyvite  Prilosec  Zocor     Past Medical History:    Anemia  Arthritis  Carotid artery stenosis  CHF  CKD  CAD  Dementia  DM  Diabetic retinopathy  Diverticulitis  Enlarged prostate  GERD  Gout  Hearing loss  History of left-sided carotid  endarterectomy  HLD  HTN  Low back pain  Peripheral vascular disease  Pulmonary nodule  Seborrheic keratosis  Sleep apnea    Past Surgical History:    Coronary angiography  Create fistula arteriovenous upper extremity  Endarterectomy carotid  Repair fistula arteriovenous upper extremity  T&A    Family History:    MI  CAD    Social History:  Presents via EMS.  Former smoker, quit 1/25/2016.  Negative for alcohol use.  Marital Status:   [2]     Review of Systems   Unable to perform ROS: Patient unresponsive     Physical Exam     Patient Vitals for the past 24 hrs:   BP Temp Temp src Pulse Heart Rate Resp SpO2 Weight   08/10/18 1720 116/64 - - - 94 - 98 % -   08/10/18 1715 116/61 - - - 94 - 99 % -   08/10/18 1710 106/59 - - - 92 18 - -   08/10/18 1705 104/59 - - - 92 19 97 % -   08/10/18 1700 101/56 - - - 91 18 97 % -   08/10/18 1655 96/57 - - - 92 18 96 % -   08/10/18 1650 97/57 - - - 89 21 96 % -   08/10/18 1645 - - - - 96 20 95 % -   08/10/18 1640 118/64 - - - 95 17 95 % -   08/10/18 1635 115/64 - - - 93 18 95 % -   08/10/18 1630 119/63 - - - 94 16 95 % -   08/10/18 1625 117/65 - - - 92 18 95 % -   08/10/18 1620 114/67 - - - 90 18 94 % -   08/10/18 1615 109/61 - - - 90 21 - -   08/10/18 1610 108/63 - - - 89 16 - 64 kg (141 lb 1.5 oz)   08/10/18 1605 107/65 - - - 89 18 96 % -   08/10/18 1600 100/63 96.4  F (35.8  C) Temporal - 87 19 96 % -   08/10/18 1555 96/60 - - - 87 20 98 % -   08/10/18 1550 95/60 - - - 89 16 99 % -   08/10/18 1545 95/60 - - - 89 19 99 % -   08/10/18 1540 91/56 - - - 92 13 100 % -   08/10/18 1535 (!) 84/53 - - 93 - 27 90 % -       Physical Exam  General: Unresponsive. Intubated. Patient in severe respiratory distress. Shallow respirations.  Head:  Scalp is NC/AT  Eyes:  Pinpoint pupils bilaterally.  ENT:  The external nose and ears are normal. ETT in place  Neck:  Atraumatic  CV:  Regular rate and rhythm    Left arm dialysis fistula C/D/I.    Left tibial IO.  Resp:  Diffuse, coarse lung  sounds    Endotracheal tube in place.  GI:  Abdomen is soft, no distension, no tenderness. No peritoneal signs  MS:  Lower extremity 3+ pitting edema bilaterally.   Rectal: Dark brown stool, Hemoccult positive  Skin:  Warm and dry, No rash or lesions noted.  Neuro: Intubated and sedated. Reportedly moving all 4 extremities prior to intubation.    Emergency Department Course   ECG:  Time: 1554  Vent. Rate 88 bpm. UT interval 194. QRS duration 100. QT/QTc 398/481. P-R-T axis 82 73 221. Sinus rhythm with occasional premature ventricular complexes. Cannot rule out Anterior infarct, age undetermined. Abnormal ECG. Read time: 1604    Imaging:  Radiographic findings were communicated with the family who voiced understanding of the findings.    XR Chest Port 1 View:  Endotracheal tube tip approximately 4 cm above the amina.  Patchy opacities over both lungs, RIGHT worse than LEFT, edema versus  atypical infection. No pneumothorax seen on either side. As per radiology.    Laboratory:  Occult blood stool: Positive    UA with micro: , Albumin 30, Amorphous Crystals Few, o/w negative    CBC: WBC: 16.8 (H), HGB: 6.1 (LL), PLT: 314  CMP: Glucose 230 (H), Anion Gap 18 (H),  (H), Creatinine 6.88 (H), GFR Estimate 8 (L), Calcium 8.0 (L), Albumin 3.0 (L), Protein Total 5.9 (L), o/w WNL     BNP: 91174 (H)    1539 Troponin: 5.992 (HH)    Lactic acid: 1.8    Blood culture x2 pending.  Urine culture pending.    Procedures:      Rapid Sequence Intubation Procedure note         Indication:  Respirator Failure      The patient was premedicated with Etomidate and Succinycholine prior to ED presentation and     intubated by Dr. Barone with a     #7.0 cuffed, ET Tube and a     Glidescope blade.  Following intubation the patient's breath     sounds were equal.  ET Tube placement    was  confirmed with auscultation, ETCO2 monitor and CXR.     MONITORING:   Monitoring consisted of : heart rate, cardiac monitor, continuous pulse  oximetry, blood pressure checks, level of consciousness, IV access, constant attendance by RN, and MD attendance until patient stable or transfer of care    PATIENT STATUS:   Complications from this procedure were none. Oxygen saturations following intubation were >90%.    Interventions:  1556 Lasix 80 mg IV given  1626 Diprivan 5 mcg/kg/min x 63.5 kg IV  1631 Maxipime 2 g I  1649 Diprivan 10 mcg/kg/min x 63.5 kg IV DOSE CHANGE  1650 Diprivan 20 mg IV  1706 Vancocin 1500 mg IV  1716 Diprivan 15 mcg/kg/min x 63.5 kg IV DOSE CHANGE    Emergency Department Course:  1531 The patient presents to the ED. Patient presents intubated and with IO infusion in place.  1536 I performed my exam on this patient.   I checked the intubation. Patient's intubation removed and patient re-intubated.  1541 The patient's wife presents to the ED outside the room. I spoke with the wife regarding the patient's condition and history.  1542 Portable Chest X-ray performed.  1545 Patient taken off dopamine.  1550 Catheter placed.  1554 EKG obtained in the ED, see results above.   1556 Lasix 80 mg IV given.  1602 I spoke with Dr. Rene Rodas, nephrology, regarding this patient.  1618 I rechecked this patient.  1640 I rechecked this patient and performed a rectal exam. Dark stool noted. Stool sample collected.  1650 I spoke with Dr. Hill, intensivist, who agreed to admit this patient.  1710 I rechecked the patient and discussed the results of his workup thus far.    Findings and plan explained to the spouse who consents to admission. Discussed the patient with Dr. Hill, who will admit the patient to an ICU bed for further monitoring, evaluation, and treatment.    Impression & Plan      Medical Decision Making:  Patient is a 78-year-old male who presents in acute respiratory distress; intubated by EMS and on dopamine infusion, secondary to hypotension.  Patient's medical history and records were reviewed.  Initial consideration for, but not  limited to, CHF exacerbation/fluid overload, infectious process, cardiomyopathy, ACS/MI, electrolyte abnormality, metabolic disturbance, among others.  Labs, EKG, and imaging was obtained.  Patient was initially satting in the low to mid 80s on initial arrival.  Examination with glide scope showed displacement of ET tube into the patient's vocal cords, with balloon partially inflated.  The ET tube balloon was completely deflated, and the patient was reintubated per procedure note above; no additional sedatives or paralytics needed.  Following reintubation the patient's oxygen saturations improved to normal.  Patient was placed on increased PEEP.  IO was placed by EMS prior to ED arrival and additional IV access was established.  Dopamine infusion was discontinued and patient's blood pressures remained steady without need for additional pressors during ED course.  Labs notable for significant anemia (hemoglobin 6.1: History of chronic anemia), elevated creatinine (dialysis dependent), significant elevated troponin (5.992), related BNP (11,748), elevated anion gap (18), and UA without evidence of infection.  EKG demonstrates ST depression in V3 without other significant change from previous.  Patient with no report of chest pain prior to ED arrival.  Rectal exam was performed, demonstrating dark brown stool which is Hemoccult positive; aspirin/heparin deferred due to concern for possible GI bleed.  Patient was placed on a propofol infusion for sedation.  Chest x-ray demonstrated good position of ET tube with evidence of pulmonary edema.  As pneumonia cannot be excluded patient was provided cefepime and vancomycin; blood cultures obtained prior to antibiotic administration.  Lactic acid obtained and is normal (1.8).  Case discussed with Dr. Patel of nephrology shortly after patient's arrival as patient will require urgent dialysis.  Additionally case was discussed with Dr. Hill of the ICU who accepted patient for  admission.  While in ED patient also received 80 mg IV Lasix and House catheter was placed with evidence of some urine production.  At this time, presentation is most consistent with likely CHF exacerbation though underlying ACS/cardiomyopathy or infectious process not excluded as well as GI bleed.  Family consented to blood transfusion; blood was ordered and is pending.      Critical Care time:  was 40 minutes for this patient excluding procedures.    Diagnosis:    ICD-10-CM    1. Anemia in chronic kidney disease, on chronic dialysis (H) N18.6 Blood culture    D63.1 Urine Culture    Z99.2 Blood culture     Central line     Central line     Blood culture     Blood culture     Blood culture     Blood culture   2. CHF exacerbation (H) I50.9 ABO/Rh type and screen     Occult blood stool (ED Lab POCT Only 3-11)     Lactic acid     Glucose by meter     Glucose by meter     Blood component     Blood component     Lactic acid whole blood     Basic metabolic panel     CBC with platelets     Lipase     Procalcitonin     Troponin I     Blood component     Blood component     Cortisol     Cortisol     TSH with free T4 reflex     TSH with free T4 reflex     CANCELED: Platelet count     CANCELED: TSH with free T4 reflex (Add on or recollect)     CANCELED: Cortisol   3. Acute respiratory failure with hypoxia (H) J96.01    4. Acute pulmonary edema (H) J81.0    5. Gastrointestinal hemorrhage, unspecified gastrointestinal hemorrhage type K92.2    6. Elevated troponin R74.8          Disposition:  Admitted to Dr. Hill.    IGrey, am serving as a scribe on 8/10/2018 at 3:39 PM to personally document services performed by Tom Barone DO based on my observations and the provider's statements to me.     Grey Cardoza  8/10/2018    EMERGENCY DEPARTMENT       Tom Barone DO  08/11/18 0005

## 2018-08-10 NOTE — IP AVS SNAPSHOT
[place] [address] ONCOLOGY: [phone]                                              INTERAGENCY TRANSFER FORM - PHYSICIAN ORDERS   8/10/2018                    Hospital Admission Date: 8/10/2018  EDMOND KLEIN   : 1940  Sex: Male        Attending Provider: Tom Hill MD     Allergies:  Ace Inhibitors, Penicillins    Infection:  None   Service:  HOSPITALIST    Ht:  --   Wt:  63.1 kg (139 lb 1.8 oz)   Admission Wt:  64 kg (141 lb 1.5 oz)    BMI:  --   BSA:  --            Patient PCP Information     Provider PCP Type    Shaila Silverman MD General      ED Clinical Impression     Diagnosis Description Comment Added By Time Added    CHF exacerbation (H) [I50.9] CHF exacerbation (H) [I50.9]  Serena Dumont 8/10/2018  4:57 PM    Acute respiratory failure with hypoxia (H) [J96.01] Acute respiratory failure with hypoxia (H) [J96.01]  Tom Barone DO 2018 12:02 AM    Acute pulmonary edema (H) [J81.0] Acute pulmonary edema (H) [J81.0]  Tom Barone DO 2018 12:02 AM    Gastrointestinal hemorrhage, unspecified gastrointestinal hemorrhage type [K92.2] Gastrointestinal hemorrhage, unspecified gastrointestinal hemorrhage type [K92.2]  Tom Barone DO 2018 12:02 AM    Elevated troponin [R74.8] Elevated troponin [R74.8]  Tom Barone DO 2018 12:02 AM      Hospital Problems as of 2018              Priority Class Noted POA    Acute respiratory failure (H) Medium  8/10/2018 Yes      Non-Hospital Problems as of 2018              Priority Class Noted    Coronary artery disease Medium  Unknown    Hyperlipidemia Medium  Unknown    Hypertension Medium  Unknown    Myocardial infarction Medium  Unknown    Peripheral vascular disease (H) Medium  Unknown    Gastroesophageal reflux disease Medium  Unknown    Diabetes (H) Medium  Unknown    UTI (urinary tract infection) Medium  2014    Left carotid stenosis Medium   4/19/2016    Anemia in chronic kidney disease Medium  5/10/2016    Anemia in stage 4 chronic kidney disease (H) Medium  5/10/2016    Diverticulitis of colon Medium  7/24/2016    Diarrhea Medium  7/24/2016    Diverticulitis Medium  7/24/2016    Physical deconditioning Medium  7/28/2016    C. difficile colitis Medium  7/28/2016    CKD (chronic kidney disease) stage 4, GFR 15-29 ml/min (H) Medium  7/28/2016    Benign hypertension with chronic kidney disease, stage IV (H) Medium  7/28/2016    Pseudoaneurysm (H) Medium  11/15/2016    Pseudoaneurysm of AV hemodialysis fistula (H) Medium  11/15/2016      Code Status History     Date Active Date Inactive Code Status Order ID Comments User Context    8/14/2018 10:38 AM  DNR/DNI [id number]  Ellis Esquivel MD Outpatient    8/12/2018  9:57 AM 8/14/2018 10:38 AM DNR/DNI [id number] Place note in progress notes. Code status discussion is appropriately documented in the chart. Rex Haley MD Inpatient    8/12/2018  3:43 AM 8/12/2018  9:57 AM DNR/DNI [id number] Place note in progress notes. Code status discussion is appropriately documented in the chart. Mando Sharp MD Inpatient    8/11/2018 11:25 AM 8/12/2018  3:43 AM DNR [id number]  Veronika Hyatt RN Inpatient    8/10/2018  6:46 PM 8/11/2018 11:25 AM Full Code [id number]  Tom Hill MD Inpatient    11/16/2016  3:08 PM 8/10/2018  6:46 PM Full Code [id number]  Jeremy Pereira MD Outpatient    11/15/2016  9:40 PM 11/16/2016  3:08 PM Full Code [id number]  Bryon Oshea MD Inpatient    11/15/2016  4:51 PM 11/15/2016  9:40 PM Full Code [id number]  Rory Sloan MD Inpatient    7/25/2016 11:55 AM 11/15/2016  4:51 PM Full Code [id number]  Rich Garcia MD Outpatient    7/24/2016  2:00 AM 7/25/2016 11:55 AM Full Code [id number]  Kalyn Olguin MD Inpatient    4/20/2016  9:49 AM 7/24/2016  2:00 AM Full Code [id number]  Tom House MD Outpatient    4/19/2016  9:08 PM 4/20/2016  9:49 AM  Full Code [id number]  Pablo Espinal MD Inpatient    12/24/2014  8:27 PM 12/28/2014  4:19 PM Full Code [id number]  Lazarus Oscar, DO Inpatient         Medication Review      START taking        Dose / Directions Comments    acetaminophen 650 MG Suppository   Commonly known as:  TYLENOL   Used for:  Hospice care patient        Dose:  650 mg   Place 1 suppository (650 mg) rectally every 6 hours as needed for mild pain or fever (temperature over 100? F )   Quantity:  60 suppository   Refills:  0        atropine 1 % ophthalmic solution   Used for:  Hospice care patient        Dose:  1-2 drop   Place 1-2 drops under the tongue every hour as needed for other (secretions)   Quantity:  1 Bottle   Refills:  0    Future refills by hospice doctor or PCP Dr. Shaila Silverman with phone number [phone].       bisacodyl 10 MG Suppository   Commonly known as:  DULCOLAX   Used for:  Hospice care patient        Dose:  10 mg   Start taking on:  8/16/2018   Place 1 suppository (10 mg) rectally once as needed for other (for no bowel movement for 72 hours)   Quantity:  12 suppository   Refills:  0    Future refills by hospice doctor or PCP Dr. Shaila Silverman with phone number [phone].       haloperidol 2 MG/ML (HIGH CONC) solution   Commonly known as:  HALDOL   Used for:  Hospice care patient        Dose:  1 mg   Take 0.5 mLs (1 mg) by mouth every 6 hours as needed for agitation (delirium, hallucinations, restlessness)   Quantity:  60 mL   Refills:  0    Future refills by hospice physician or PCP Dr. Shaila Silverman with phone number [phone].       HYDROmorphone 10 mg/mL (HIGH CONC) oral solution   Commonly known as:  DILAUDID   Used for:  Hospice care patient        Dose:  4 mg   Place 0.4 mLs (4 mg) under the tongue every 2 hours as needed for moderate to severe pain (SOB, dyspnea, distress)   Quantity:  30 mL   Refills:  0    Future refills by hospice physician or PCP Dr. Shaila Silverman with  phone number [phone].       LORazepam 2 MG/ML (HIGH CONC) solution   Commonly known as:  LORazepam INTENSOL   Used for:  Hospice care patient        Dose:  2 mg   Take 1 mL (2 mg) by mouth every 4 hours as needed for anxiety   Quantity:  15 mL   Refills:  0    Future refills by Hospice physician or PCP Dr. Shaila Silverman with phone number [phone].         STOP taking     allopurinol 100 MG tablet   Commonly known as:  ZYLOPRIM           amLODIPine 10 MG tablet   Commonly known as:  NORVASC           aspirin 81 MG EC tablet           DIALYVITE 800/ULTRA D Tabs           docusate sodium 100 MG capsule   Commonly known as:  COLACE           donepezil 5 MG tablet   Commonly known as:  ARIcept           flurbiprofen 0.03 % ophthalmic solution   Commonly known as:  OCUFEN           furosemide 80 MG tablet   Commonly known as:  LASIX           hydrALAZINE 100 MG Tabs tablet   Commonly known as:  APRESOLINE           metoprolol succinate 50 MG 24 hr tablet   Commonly known as:  TOPROL-XL           omeprazole 20 MG CR capsule   Commonly known as:  priLOSEC           simvastatin 40 MG tablet   Commonly known as:  ZOCOR                     Further instructions from your care team       [facility]charge to [facility], phone [phone]    Summary of Visit     Reason for your hospital stay       Admitted for NSTEMI, acute respiratory failure secondary to fluid overload in setting of acute systolic CHF exacerbation and ESRD on dialysis, acute GI bleed with acute blood loss anemia and chronic anemia associated with renal failure, and community acquired pneumonia with unidentified organism.             After Care     Advance Diet as Tolerated       Follow this diet upon discharge: Orders Placed This Encounter      Regular Diet Adult         General info for SNF       Length of Stay Estimate: Short Term Care: Estimated # of Days <30  Condition at Discharge: Terminal  Level of care: skilled   Rehabilitation  Potential: N/A  Admission H&P remains valid and up-to-date: Yes  Recent Chemotherapy: N/A  Use Nursing Home Standing Orders: Yes       Mantoux instructions       Give two-step Mantoux (PPD) Per Facility Policy Yes             Follow-Up Appointment Instructions     Future Labs/Procedures    Follow Up and recommended labs and tests     Comments:    Follow up with Nursing home physician as needed for end of life care.      Follow-Up Appointment Instructions     Follow Up and recommended labs and tests       Follow up with Nursing home physician as needed for end of life care.             Statement of Approval     Ordered          08/14/18 1040  I have reviewed and agree with all the recommendations and orders detailed in this document.  EFFECTIVE NOW     Approved and electronically signed by:  Ellis Esquivel MD

## 2018-08-10 NOTE — IP AVS SNAPSHOT
` `           [place] [address] ONCOLOGY: [phone]                 INTERAGENCY TRANSFER FORM - NOTES (H&P, Discharge Summary, Consults, Procedures, Therapies)   8/10/2018                    Hospital Admission Date: 8/10/2018  EDMOND CROFT   : 1940  Sex: Male        Patient PCP Information     Provider PCP Type    Shaila Silverman MD General      History & Physicals     No notes of this type exist for this encounter.         Discharge Summaries      Discharge Summaries by Ellis Esquivel MD at 2018 10:40 AM     Author:  Ellis Esquivel MD Service:  Hospitalist Author Type:  Physician    Filed:  2018 10:51 AM Date of Service:  2018 10:40 AM Creation Time:  2018 10:40 AM    Status:  Signed :  Ellis Esquivel MD (Physician)         [facility]    Discharge Summary  Hospitalist    Date of Admission:  8/10/2018  Date of Discharge:  2018[BJ1.1]    Discharge Diagnoses[BJ1.2]    CHF exacerbation (H)  Anemia in chronic kidney disease, on chronic dialysis (H)  Acute respiratory failure with hypoxia (H)  Acute pulmonary edema (H)  Gastrointestinal hemorrhage, unspecified gastrointestinal hemorrhage type  NSTEMI  Hospice care patient[BJ1.1]    History of Present Illness[BJ1.2]   Edmond Croft is an 78-year-old gentleman who has a significant history for renal disease on hemodialysis, has a history of coronary artery disease with an MI last year.  He has a history of CHF, type 2 diabetes mellitus, gastroesophageal reflux, hypertension and peripheral vascular disease who was admitted when he experienced acute shortness of breath.  The wife reports that he was at home when all of a sudden, they got back from the doctor's office and he went into the bathroom and developed acute shortness of breath and weakness and the ambulance had to be called.  He was brought to the hospital and intubated.  He was also hypotensive and was noticed to have a  hemoglobin of 6.1 with guaiac positive stools.[BJ1.1]     Hospital Course[BJ1.2]   Bean Croft was admitted on 8/10/2018.  The following problems were addressed during his hospitalization:    NSTEMI  Acute respiratory failure secondary to fluid overload in setting of acute systolic CHF exacerbation and ESRD on dialysis.   Acute GI bleed with acute blood loss anemia and chronic anemia associated with renal failure  Community acquired pneumonia bilateral lower lobes with unidentified organism  Acute respiratory failure presumed 2/2 volume overload with acute vs chronic anemia in setting of CHF and ESRD on HD. Unable to tolerate fluid removal by HD. Plan to proceed with GI bleeding w/u in addition to possible coronary w/u; however, family requested a conservative plan. Changed to comfort care and extubated 8/11.  -dilaudid IV PRN for pain  -appreciate palliative care assistance with medications  -SW looking into possible discharge to nursing home in [place] with hospice on 8/14/18    Probable cause of death:  Acute respiratory failure  Community acquired pneumonia  NSTEMI  Acute systolic CHF exacerbation    Additional Diagnoses: Acute GI bleed, chronic anemia from renal failure, ESRD on dialysis    # Discharge Pain Plan:   - During his hospitalization, Bean experienced pain due to heart attack and pneumonia.  He is unable to participate in a plan for discharge pain management; however, the plan was discussed in a collaborative fashion with his family.   - Opioids prescribed on discharge: dilaudid PRN  - Duration of opioids after discharge: terminal, until he dies  - Bowel regimen: bisacodyl suppository  - Pharmacologic adjuvant: acetaminophen PRN[BJ1.1]    Ellis Esquivel[BJ1.2], MD  [phone] (C)  [phone] (P)  Text Page (7 am to 6 pm)[BJ1.1]    Significant Results and Procedures[BJ1.2]   CT Chest/Abdomen/Pelvis showed bilateral pneumonia  Troponin peaked at 32.861[BJ1.1]    Pending  Results[BJ1.2]   These results will be called out if positive but patient is now in hospice care and nothing will be done with these lab results.[BJ1.1]  Unresulted Labs Ordered in the Past 30 Days of this Admission     Date and Time Order Name Status Description    8/10/2018 1633 Blood culture Preliminary     8/10/2018 1606 Blood culture Preliminary         Code Status[BJ1.2]   Comfort Care[BJ1.1]       Primary Care Physician   Shaila Silverman    Physical Exam             Resp: 18       Vitals:    08/10/18 1610 08/10/18 1745 08/11/18 0316   Weight: 64 kg (141 lb 1.5 oz) 61.9 kg (136 lb 7.4 oz) 63.1 kg (139 lb 1.8 oz)[BJ1.2]     Vital Signs with Ranges[BJ1.1]  Resp:  [14-18] 18  I/O last 3 completed shifts:  In: -   Out: 100 [Urine:100][BJ1.2]    Constitutional: Resting comfortably[BJ1.1]    Discharge Disposition[BJ1.2]   Discharged to short-term care facility  Condition at discharge: Terminal[BJ1.1]    Consultations This Hospital Stay   PHARMACY TO DOSE VANCO  PHARMACY TO DOSE VANCO  CARDIOLOGY IP CONSULT  GASTROENTEROLOGY IP CONSULT  HOSPITALIST IP CONSULT  SOCIAL WORK IP CONSULT  PALLIATIVE CARE ADULT IP CONSULT  SPIRITUAL HEALTH SERVICES IP CONSULT  CARE COORDINATOR IP CONSULT    Time Spent on this Encounter[BJ1.2]   IEllis, personally saw the patient today and spent greater than 30 minutes discharging this patient.[BJ1.1]    Discharge Orders     General info for SNF   Length of Stay Estimate: Short Term Care: Estimated # of Days <30  Condition at Discharge: Terminal  Level of care: skilled   Rehabilitation Potential: N/A  Admission H&P remains valid and up-to-date: Yes  Recent Chemotherapy: N/A  Use Nursing Home Standing Orders: Yes     Mantoux instructions   Give two-step Mantoux (PPD) Per Facility Policy Yes     Reason for your hospital stay   Admitted for NSTEMI, acute respiratory failure secondary to fluid overload in setting of acute systolic CHF exacerbation and ESRD on dialysis, acute GI  bleed with acute blood loss anemia and chronic anemia associated with renal failure, and community acquired pneumonia with unidentified organism.     Follow Up and recommended labs and tests   Follow up with Nursing home physician as needed for end of life care.     DNR/DNI     Advance Diet as Tolerated   Follow this diet upon discharge: Orders Placed This Encounter     Regular Diet Adult         Discharge Medications   Current Discharge Medication List      START taking these medications    Details   acetaminophen (TYLENOL) 650 MG Suppository Place 1 suppository (650 mg) rectally every 6 hours as needed for mild pain or fever (temperature over 100  F )  Qty: 60 suppository, Refills: 0    Associated Diagnoses: Hospice care patient      atropine 1 % ophthalmic solution Place 1-2 drops under the tongue every hour as needed for other (secretions)  Qty: 1 Bottle, Refills: 0    Comments: Future refills by hospice doctor or PCP Dr. Shaila Silverman with phone number [phone].  Associated Diagnoses: Hospice care patient      bisacodyl (DULCOLAX) 10 MG Suppository Place 1 suppository (10 mg) rectally once as needed for other (for no bowel movement for 72 hours)  Qty: 12 suppository, Refills: 0    Comments: Future refills by hospice doctor or PCP Dr. Shaila Silverman with phone number [phone].  Associated Diagnoses: Hospice care patient      haloperidol (HALDOL) 2 MG/ML (HIGH CONC) solution Take 0.5 mLs (1 mg) by mouth every 6 hours as needed for agitation (delirium, hallucinations, restlessness)  Qty: 60 mL, Refills: 0    Comments: Future refills by hospice physician or PCP Dr. Shaila Silverman with phone number [phone].  Associated Diagnoses: Hospice care patient      HYDROmorphone (DILAUDID) 10 mg/mL (HIGH CONC) oral solution Place 0.4 mLs (4 mg) under the tongue every 2 hours as needed for moderate to severe pain (SOB, dyspnea, distress)  Qty: 30 mL, Refills: 0    Comments: Future refills by  hospice physician or PCP Dr. Shaila Silverman with phone number [phone].  Associated Diagnoses: Hospice care patient      LORazepam (LORAZEPAM INTENSOL) 2 MG/ML (HIGH CONC) solution Take 1 mL (2 mg) by mouth every 4 hours as needed for anxiety  Qty: 15 mL, Refills: 0    Comments: Future refills by Hospice physician or PCP Dr. Shaila Silverman with phone number [phone].  Associated Diagnoses: Hospice care patient         STOP taking these medications       allopurinol (ZYLOPRIM) 100 MG tablet Comments:   Reason for Stopping:         amLODIPine (NORVASC) 10 MG tablet Comments:   Reason for Stopping:         aspirin 81 MG EC tablet Comments:   Reason for Stopping:         docusate sodium (COLACE) 100 MG capsule Comments:   Reason for Stopping:         donepezil (ARICEPT) 5 MG tablet Comments:   Reason for Stopping:         flurbiprofen (OCUFEN) 0.03 % ophthalmic solution Comments:   Reason for Stopping:         furosemide (LASIX) 80 MG tablet Comments:   Reason for Stopping:         hydrALAZINE (APRESOLINE) 100 MG TABS tablet Comments:   Reason for Stopping:         metoprolol (TOPROL-XL) 50 MG 24 hr tablet Comments:   Reason for Stopping:         Multiple Vitamins-Minerals (DIALYVITE 800/ULTRA D) TABS Comments:   Reason for Stopping:         omeprazole (PRILOSEC) 20 MG CR capsule Comments:   Reason for Stopping:         simvastatin (ZOCOR) 40 MG tablet Comments:   Reason for Stopping:             Allergies   Allergies   Allergen Reactions     Ace Inhibitors Cough     Penicillins      Other reaction(s): *Unknown  Pt was given it for rheumatic fever. MD told him to never take it again per pt statement. ? Allergy.     Data[BJ1.2]   Most Recent 3 CBC's:[BJ1.1]  Recent Labs   Lab Test  08/11/18   1700  08/11/18   1006  08/11/18   0500  08/11/18   0300  08/10/18   1855  08/10/18   1539   WBC   --    --    --   15.1*  26.1*  16.8*   HGB  8.3*  8.9*  8.6*  8.8*  7.2*  6.1*   MCV   --    --    --   96  103*   104*   PLT   --    --    --   232  380  314[BJ1.2]      Most Recent 3 BMP's:[BJ1.1]  Recent Labs   Lab Test  08/11/18   0300  08/10/18   1855  08/10/18   1539   NA  138  138  138   POTASSIUM  4.2  3.7  4.2   CHLORIDE  99  100  100   CO2  28  27  20   BUN  42*  53*  104*   CR  3.70*  3.28*  6.88*   ANIONGAP  11  11  18*   TAMMY  7.6*  8.3*  8.0*   GLC  144*  161*  230*[BJ1.2]     Most Recent 2 LFT's:[BJ1.1]  Recent Labs   Lab Test  08/10/18   1539 03/27/18 03/01/18   1005   AST  41  11*  15   ALT  16  10  15   ALKPHOS  58   --   64   BILITOTAL  0.4   --   0.7[BJ1.2]     Most Recent INR's and Anticoagulation Dosing History:  Anticoagulation Dose History     Recent Dosing and Labs Latest Ref Rng & Units 5/11/2015 11/16/2016    INR 0.86 - 1.14 0.99 1.00        Most Recent 3 Troponin's:[BJ1.1]  Recent Labs   Lab Test  08/11/18   1700  08/11/18   0900  08/11/18   0300   TROPI  27.921*  32.861*  28.173*[BJ1.2]     Most Recent Cholesterol Panel:[BJ1.1]  Recent Labs   Lab Test  03/01/18   1005   CHOL  102   LDL  44   HDL  43   TRIG  75[BJ1.2]     Most Recent 6 Bacteria Isolates From Any Culture (See EPIC Reports for Culture Details):[BJ1.1]  Recent Labs   Lab Test  08/10/18   1959  08/10/18   1958  08/10/18   1633  08/10/18   1605  08/10/18   1553  12/25/14   1825   CULT  Canceled, Test credited  Duplicate request    Canceled, Test credited  Duplicate request    No growth after 4 days  No growth after 4 days  No growth  No growth[BJ1.2]     Most Recent TSH, T4 and A1c Labs:[BJ1.1]  Recent Labs   Lab Test  08/10/18   1539 03/27/18   TSH  0.91   --    A1C   --   6.2*[BJ1.2]       Results for orders placed or performed during the hospital encounter of 08/10/18   XR Chest Port 1 View    Narrative    XR CHEST PORT 1 VW 8/10/2018 3:48 PM    COMPARISON: 10/27/2016    HISTORY: Respiratory distress, endotracheal tube positioning.      Impression    IMPRESSION: Endotracheal tube tip approximately 4 cm above the amina.  Patchy  opacities over both lungs, RIGHT worse than LEFT, edema versus  atypical infection. No pneumothorax seen on either side.    REY PRUITT MD   XR Chest Port 1 View    Narrative    CHEST PORTABLE ONE VIEW 8/10/2018 8:07 PM     HISTORY: Check central line.    COMPARISON: Chest x-ray 8/10/2018.      Impression    IMPRESSION:   1. New right neck central line tip at the superior vena cava.  Nasogastric tube is identified. The ET tube tip is difficult to  visualize.  2. Bilateral vascular and interstitial prominence again noted. This  may be mildly improved on the right but is unchanged on the left.    SOBIA RICHARDSON MD   CT Chest Abdomen Pelvis w/o Contrast    Narrative    CT CHEST/ABDOMEN/PELVIS WITHOUT CONTRAST  8/10/2018 10:02 PM    HISTORY: Occult blood positive stool. Patient with congestive heart  failure, ESRD with questionable sepsis and acute respiratory failure.    TECHNIQUE: CT scan obtained of the chest, abdomen, and pelvis without  IV contrast. Radiation dose for this scan was reduced using automated  exposure control, adjustment of the mA and/or kV according to patient  size, or iterative reconstruction technique.    COMPARISON:  CT abdomen and pelvis 7/23/2016.    FINDINGS:  Chest: The patient is intubated. Nasogastric tube in place. Thoracic  aortic and coronary artery calcifications. Small bilateral pleural  effusions. Bibasilar atelectasis. There are patchy areas of  consolidation suggested at the bilateral lower lobes and posterior  left upper lobe adjacent to the major fissure. Mild emphysematous  changes. Some respiratory motion limits detail assessment of the  lungs. There are a few scattered mildly prominent lymph nodes in the  mediastinum. One of the larger lymph nodes is 2.0 x 1.2 cm distal  right paratracheal location series 2 image 28. There are other  examples.    Abdomen/pelvis: Diffuse vascular calcifications. House catheter  decompresses the bladder. Small gas in the bladder. Prostate  is  prominent measuring 4.7 cm. No bowel obstruction. Colonic  diverticulosis. No convincing diverticulitis or other bowel  inflammation. Normal appendix.    Within the limits of unenhanced scanning, the liver, gallbladder,  spleen, and pancreas shows no significance. Nodular thickening is  hypodense involving the left adrenal with internal density measurement  of 1.8 Hounsfield units. This nodule is 1.7 cm series 2 image 63 and  is consistent with an adenoma. It appears stable. Stable minimal  thickening of the right adrenal. Bilateral renal cysts again noted.  One of the largest is at the upper right kidney measuring 8.6 cm,  stable image 59. There are other cyst examples in the kidneys. No  enlarged lymph nodes in the abdomen or pelvis. Bone windows of the  chest, abdomen, and pelvis do not show any convincing destructive bony  lesion.      Impression    IMPRESSION:  1. Bilateral patchy areas of consolidation at the lung bases and  posterior left upper lobe. Correlate with evidence of multifocal  pneumonia. Lung base opacities may also represent atelectasis.  2. Small bibasilar pleural effusions.  3. A few nonspecific mildly prominent mediastinal lymph nodes.  4. Colonic diverticulosis without diverticulitis. No acute  inflammatory change of the bowel. Please note that this examination  cannot exclude any underlying colonic lesions.  5. Enlarged prostate.    SOBIA RICHARDSON MD[BJ1.1]          Revision History        User Key Date/Time User Provider Type Action    > BJ1.2 8/14/2018 10:51 AM Ellis Esquivel MD Physician Sign     BJ1.1 8/14/2018 10:40 AM Ellis Esquivel MD Physician                      Consult Notes      Consults by González Motta at 8/13/2018 10:49 AM     Author:  González Motta Service:  Social Work Author Type:      Filed:  8/13/2018  2:33 PM Date of Service:  8/13/2018 10:49 AM Creation Time:  8/13/2018 10:49 AM    Status:  Addendum :  González Motta ()     Consult  Orders:    1. Social Work IP Consult [[id number]] ordered by Rex Haley MD at 08/12/18 0955                Care Transition Initial Assessment - SW  Reason For Consult: discharge planning, end of life/hospice  Met with: Patient    Active Problems:    Acute respiratory failure (H)       DATA  Lives With: spouse  Living Arrangements: house  Description of Support System: Supportive, Involved  Who is your support system?: Wife, Children  Support Assessment: Adequate family and caregiver support.   Identified issues/concerns regarding health management: SW consult noted for discharge planning/hospice planning. Patient is a 78 year old male now on comfort care. He was living at home with his spouse previously. Met with spouse, Carmina and daughter, Anamaria while patient slept soundly. We discussed the Medicare benefit for hospice care and the different settings where this can be received. Noted the limits of coverage. They are thinking home will not be a good option and are interested in a care facility. Reviewed SNF and residential hospice facilities and provided written information. Offered a meeting with [facility], which they agreed to. Made a referral; Umesh will see them this morning. Family will consider further before agreeing to any referrals.    Quality Of Family Relationships: supportive, involved     ASSESSMENT  Cognitive Status: Not assessed  Concerns to be addressed: SW is following for discharge coordination.   PLAN  Financial costs for the patient includes: Private pay charges for SNF, including down payment requirements and residential hospice costs discussed.  Patient given options and choices for discharge: Yes  Patient/family is agreeable to the plan? Yes  Patient/family Goals and Preferences: SNF vs. Residential Hospice[RH1.1]       Update  A DOD referral was sent to [facility], per request of Umesh with  Hospice. Family reported they are familiar with this facility. They  request a private room, which was noted in the referral.[RH1.2]    Update  Jackie at [facility] can accept patient on Tuesday in a shared room with no roommate. They will move patient to a private room when available. The deposit requested is $5000 and the private room charge is $40. Updated patient's spouse and daughter, Anamaria. Both agree to this plan.[RH1.3] Will update [facility] when the discharge time is set.[RH1.4]     Revision History        User Key Date/Time User Provider Type Action    > RH1.4 8/13/2018  2:33 PM González Motta  Addend     RH1.3 8/13/2018  2:32 PM González Motta  Addend     RH1.2 8/13/2018 11:16 AM González Motta  Addend     RH1.1 8/13/2018 10:58 AM González Motta  Sign            Consults by Umesh Chow RN at 8/13/2018 11:47 AM     Author:  Umesh Chow RN Service:  Hospice Author Type:  Registered Nurse    Filed:  8/13/2018 11:54 AM Date of Service:  8/13/2018 11:47 AM Creation Time:  8/13/2018 11:47 AM    Status:  Signed :  Umesh Chow, RN (Registered Nurse)         Writer met with patient's wife Carmina and daughter Anamaria.  Discussed hospice benefit and philosophy.  Family goal is for patient to discharge to a SNF with hospice care.  Their first preference is [facility], as pt has been there in the past and is close to home for his wife.  Discussed that facility cost is private pay, and not covered by medicare.  Reviewed hospice medications with family.  KATELYNN Garcia is working on sending referrals for pt.  There is no discharge plan in place yet.  Discussed that MD will assess pt tomorrow to determine if he is stable to transport.  Please order the following meds at discharge and send with pt.  No ranges, and please bubble pack.  Please update hospice tomorrow so discharge plans can be coordinated.    Comfort meds per palliative care recommendations/orders today:  Acetaminophen 650mg  supp, give 1 supp rectally every 4 hours PRN pain/fever  Atropine 1% drops, give 2 drops po/sl every 2 hours PRN to dry secretions  Lorazepam 2mg/ml, give 2mg (1ml) po/sl every 4 hours PRN anxiety/restlessness  Haloperidol 2mg/ml, give 1mg (0.5ml) po/sl every 6 hours PRN nausea/agitation  Hydromorphone 10mg/ml, give 4mg (0.4ml) po/sl every 2 hours PRN pain/dyspnea  Bisacodyl supp 10mg, give 1 supp daily PRN constipation.    Thank you for this consult.[AH1.1]     Revision History        User Key Date/Time User Provider Type Action    > AH1.1 8/13/2018 11:54 AM Umesh Chow RN Registered Nurse Sign            Consults by Blanka Montes APRN CNP at 8/13/2018 11:44 AM     Author:  Blanka Montes APRN CNP Service:  Palliative Author Type:  Nurse Practitioner    Filed:  8/13/2018 11:44 AM Date of Service:  8/13/2018 11:44 AM Creation Time:  8/13/2018 11:34 AM    Status:  Signed :  Blanka Montes APRN CNP (Nurse Practitioner)         [facility]    Palliative Care Consultation     Bean Croft  MRN# [id number]  Date of Admission:  8/10/2018  Date of Service (when I saw the patient):[AW1.1] 08/13/18[AW1.2]  Reason for consult: Consulted by Dr. Haley for Symptom management[AW1.1]    Assessment & Plan[AW1.2]   Bean Croft is a 78 year old male with PMH significant for ESRD on dialysis, CAD with prior MI, CHF, DM2, GERD, HTN, and PVD, who presents with SOB. He required ICU level cares and was intubated on admission. He has since requested compassionate extubation and transition to comfort measures only. We are consulted for symptom management.     Symptoms/Recommendations   -Continue comfort measures only  -DC IV fentanyl and IV ativan  -Please remove central line   -Dilaudid 4mg SL every 2hrs PRN pain, SOB, dyspnea, distress   -Ativan 2mg SL every 3hrs PRN anxiety   -Haldol 1-2mg SL every 6hrs PRN agitation, delirium, hallucinations, distress  -Atropine, robinul PRN  secretions   -Bisacodyl supp, senna PRN constipation    -SW consult for hospice, [facility], looking at facility placement   -Prepared family that medically Don is stable for discharge with hospice at this time. It is possible he could decompensate medically. If his breathing pattern changes and his death appears more imminent, he may remain inpatient and die here. This assessment will be ongoing     Support/Coping  -Wife Carmina of 50+ years. 3 adult children, 1 present from [place]. 2 others, Natalia and Cruz, live local  -Pt is [employer], he has been anointed by Father Ja      Decisional Support, Goals of Care, Counseling & Coordination  Decisional Capacity Intact?  -No  Health Care Directive on File?  -No  Code Status/Resuscitation Preferences?  -DNR/DNI     Case discussed with bedside RN, unit KATELYNN Claudio, and hospice liaison Umesh.     Thank you for involving us in the care of this patient and family. We will continue to follow. Please do not hesitate to contact me with questions or concerns or the on-call provider for our team if evening or weekend.    Amy RENTERIA, [facility]  Palliative Medicine   Pager [phone]    Attestation:  Total time on the floor involved in the patient's care: 45 minutes  Total time spent in counseling/care coordination: >50%[AW1.1]    Chief Complaint[AW1.2]   SOB    History is obtained from the staff, family and extensive chart review.[AW1.1]     Past Medical History[AW1.2]    I have reviewed this patient's medical history and updated it with pertinent information if needed.[AW1.1]   Past Medical History:   Diagnosis Date     Anemia     unspecified     Arthritis     generalized     Carotid artery stenosis      CHF (congestive heart failure) (H)      Chronic kidney disease     Stage IV     Coronary artery disease      Dementia      Diabetes mellitus (H)     Non-insulin Dependent     Diabetic retinopathy (H)      Diverticulitis 7/2016     DM (diabetes mellitus screen)       Enlarged prostate      Gastro-oesophageal reflux disease      Gout      Hearing loss      History of left-sided carotid endarterectomy 4/19/16     Hyperlipidaemia      Hypertension      Low back pain      Peripheral vascular disease (H)      Pulmonary nodule      Seborrheic keratosis      Sleep apnea     stated by patient - not official diagnosis       Past Surgical History[AW1.2]   I have reviewed this patient's surgical history and updated it with pertinent information if needed.[AW1.1]  Past Surgical History:   Procedure Laterality Date     CORONARY ANGIOGRAPHY ADULT ORDER  1996    ???CLAIRE to mid LAD, initial obstruction 95%     CREATE FISTULA ARTERIOVENOUS UPPER EXTREMITY Left 5/31/2016    Procedure: CREATE FISTULA ARTERIOVENOUS UPPER EXTREMITY;  Surgeon: Batsheva Wright MD;  Location:  OR     ENDARTERECTOMY CAROTID Left 4/19/2016    Procedure: ENDARTERECTOMY CAROTID;  Surgeon: Batsheva Wright MD;  Location:  OR     REPAIR FISTULA ARTERIOVENOUS UPPER EXTREMITY Left 11/15/2016    Procedure: REPAIR FISTULA ARTERIOVENOUS UPPER EXTREMITY;  Surgeon: Tae Hendrix MD;  Location:  OR     TONSILLECTOMY, ADENOIDECTOMY, COMBINED         Social History[AW1.2]   Living situation: With wife in [place], [place]    Family system: Wife Carmina of 50+ years. 3 adult children, 2 live local-[facility] and Natalia, another daughter visiting from [place]     Self-identified support system: As above     Employment/education: Worked for the [employer] for 29 years     Activities/interests: Went on a cruise for their 50th wedding anniversary     Use of community resources: ND    Mu-ism affiliation: Moravian; has been anointed by Father Ja     Involvement in marlon community: ND    Impact of illness on patient: Pt has been vocal with his wife that his QOL has been poor and that he was ready to die. Now on comfort measures[AW1.1]     Family History[AW1.2]   I have reviewed this patient's family  history and updated it with pertinent information if needed.[AW1.1]   Family History   Problem Relation Age of Onset     C.A.D. Father 52     MI     C.A.D. Sister      Other - See Comments Mother 92       Allergies   Allergies   Allergen Reactions     Ace Inhibitors Cough     Penicillins      Other reaction(s): *Unknown  Pt was given it for rheumatic fever. MD told him to never take it again per pt statement. ? Allergy.       Medications   Current Facility-Administered Medications Ordered in Epic   Medication Dose Route Frequency Last Rate Last Dose     acetaminophen (TYLENOL) Suppository 650 mg  650 mg Rectal Q6H PRN         acetaminophen (TYLENOL) tablet 650 mg  650 mg Oral Q6H PRN         atropine 1 % ophthalmic solution 1-2 drop  1-2 drop Sublingual Q1H PRN         [START ON 8/16/2018] bisacodyl (DULCOLAX) Suppository 10 mg  10 mg Rectal Once PRN         glycopyrrolate (ROBINUL) injection 0.2-0.4 mg  0.2-0.4 mg Intravenous Q4H PRN         haloperidol (HALDOL) 2 MG/ML (HIGH CONC) solution 1-2 mg  1-2 mg Oral Q6H PRN         HYDROmorphone (DILAUDID) (HIGH CONC) oral solution 4 mg  4 mg Sublingual Q2H PRN         LORazepam (ATIVAN) 2 MG/ML (HIGH CONC) solution 2 mg  2 mg Oral Q3H PRN         LUBRIDERM lotion LOTN   Topical TID PRN         senna-docusate (SENOKOT-S;PERICOLACE) 8.6-50 MG per tablet 1 tablet  1 tablet Oral BID PRN         No current [facility]-ordered outpatient prescriptions on file.       Review of Systems[AW1.2]   The comprehensive review of systems is not completed at this time[AW1.1]       Physical Exam             Resp: 16       Vitals:    08/10/18 1610 08/10/18 1745 08/11/18 0316   Weight: 64 kg (141 lb 1.5 oz) 61.9 kg (136 lb 7.4 oz) 63.1 kg (139 lb 1.8 oz)[AW1.2]     CONSTITUTIONAL: Chronically ill elderly man seen lying in the chair in NAD, sleeping, did not attempt to wake him up. He appears calm and comfortable. Family present   HEENT: NCAT  NECK: Right neck central line   RESPIRATORY: NL  respiratory effort on RA, no audible secretions   NEUROLOGIC: Sleeping[AW1.1]     Data   No results found for this or any previous visit (from the past 24 hour(s)).[AW1.2]                 Revision History        User Key Date/Time User Provider Type Action    > AW1.2 8/13/2018 11:44 AM Blanka Montes APRN CNP Nurse Practitioner Sign     AW1.1 8/13/2018 11:34 AM Blanka Montes APRN CNP Nurse Practitioner             Consults signed by Cherry Oliver MD at 8/11/2018  8:08 PM      Author:  Cherry Oliver MD Service:  Internal Medicine Author Type:  Physician    Filed:  8/11/2018  8:08 PM Date of Service:  8/11/2018  1:43 PM Creation Time:  8/11/2018  2:38 PM    Status:  Signed :  Cherry Oliver MD (Physician)         Consult Date:  08/11/2018      GASTROENTEROLOGY CONSULTATION      CHIEF COMPLAINT:  Rectal bleeding.      IDENTIFICATION:  The patient is a 78-year-old man we were asked to see by his admitting physician, Dr. Yeison Hill, critical care consultant in the ICU for further evaluation of dark stools, OB positive, and a hemoglobin of 6.1.      HISTORY OF PRESENT ILLNESS:  The patient is a 78-year-old gentleman who has a significant history for renal disease on hemodialysis, has a history of coronary artery disease with an MI last year.  He has a history of CHF, type 2 diabetes mellitus, gastroesophageal reflux, hypertension and peripheral vascular disease who was admitted when he experienced acute shortness of breath.  The wife reports that he was at home when all of a sudden, they got back from the doctor's office and he went into the bathroom and developed acute shortness of breath and weakness and the ambulance had to be called.  He was brought to the hospital and intubated.  He was also hypotensive and was noticed to have a hemoglobin of 6.1 with guaiac positive stools.      At this time, the patient remains intubated, somewhat sedated on IV pressors and he has been transfused.   He is not having saumya melena nor significant rectal bleeding.      REVIEW OF SYSTEMS:  Really unobtainable from the patient.      PAST MEDICAL HISTORY:  Significant for:   1.  Anemia.  The patient is on dialysis and runs with a hemoglobin of about 10 normally.   2.  Arthritis.   3.  Coronary artery stenosis, status post carotid endarterectomy.   4.  CHF.   5.  Chronic kidney disease, on hemodialysis.   6.  Diabetes mellitus.   7.  Diabetic retinopathy.   8.  History of diverticulitis.   9.  Gastroesophageal reflux disease.   10.  Hypotension.    11.  Peripheral vascular disease.   12.  Pulmonary nodule.     13.  History of an MI.      PAST SURGICAL HISTORY:  Includes coronary angiogram in 1996, carotid endarterectomy, tonsillectomy, appendectomy and creation of AV fistula.      SOCIAL HISTORY:  The patient smokes about half pack a day.  He quit in 2016.  Denies alcohol.      FAMILY HISTORY:  Significant for coronary artery disease in his father and his sister.      PHYSICAL EXAMINATION:   GENERAL:  He is a well-nourished white gentleman, intubated and appears in no apparent distress.   VITAL SIGNS:  His temperature is 99.7, heart rate 66.  His blood pressure is 109/61.   HEENT:  Head is atraumatic, normocephalic.   SKIN:  Pale without jaundice or rash.  Sclerae appear nonicteric.   HEART:  S1, S2.  He does have a systolic murmur.   LUNGS:  Clear to auscultation.  There was some upper airway noise.  It was difficult for me to auscultate his lungs in the supine position.   ABDOMEN:  Soft, normal bowel sounds, nontender, nondistended.   EXTREMITIES:  Right and left lower extremity had trace pedal edema.   NEUROLOGIC:  He is sedated and intubated.   PSYCHIATRIC:  Unable to obtain.      LABORATORY DATA:  His sodium is normal.  Electrolytes normal.  BUN 42, creatinine 3.7, his troponins are 28 and 32, glucose 144, white blood cell count 15.1, hemoglobin 8.8, platelets are normal.      TAG ASSESSMENT AND PLAN:   Gastrointestinal bleed.  Patient has a possible GI bleed, accounting for his hemoglobin drop from 10 to 6.1.  We are discussing with his wife the appropriateness of an upper endoscopy.  There is some discussion of whether the patient wanted to be DNR/DNI with no other interventions. At this time we will hold off on endoscopy until this can be further decided in the family.  In the meantime, he will be kept n.p.o. and on a PPI.  We will continue to follow.      If the upper endoscopy is negative, he would need a repeat colonoscopy. Last colonoscopy appears to be in  from what I can find.      Thank you for asking us to participate in his care.         CHERRY BURKS MD             D: 2018   T: 2018   MT: RIMA      Name:     EDMOND KLEIN   MRN:      [id number]        Account:       [id number]   :      1940           Consult Date:  2018      Document: [id number]    [SD1.1]   Revision History        User Key Date/Time User Provider Type Action    > SD1.1 2018  8:08 PM Cherry uBrks MD Physician Sign            Consults by Omer Holden MD at 2018 10:11 AM     Author:  Omer Holden MD Service:  Cardiology Author Type:  Physician    Filed:  2018 12:53 PM Date of Service:  2018 10:11 AM Creation Time:  2018  9:52 AM    Status:  Signed :  Omer Holden MD (Physician)     Consult Orders:    1. Cardiology IP Consult: Patient to be seen: STAT - within 1 hour; Call back #: [phone]; Troponin 26.8 patient in the ICU [facility]; Consultant may enter orders: Yes [[id number]] ordered by Bandar Gibbs MD at 18 0410                [facility]    Cardiology Consultation     Date of Admission:  8/10/2018    Assessment & Plan   Edmond Klein is a 78 year old male who was admitted on 8/10/2018. I was asked to see the patient for evaluation of cardiomyopathy.    I reviewed the  echocardiogram.  This shows the presence of significant apical and mid ventricular regional wall motion abnormalities with relative preservation at the base.  Although not completely classic in appearance overall, the echocardiogram could suggest a stress cardiomyopathy.  With his history of left anterior descending stent, one cannot rule out an acute coronary syndrome, though given the severe anemia and hypotension, stress cardiomyopathy is possible.    Acute cardiomyopathy  History of coronary disease s/p PCI to LAD in 1996  HTN  T2DM  Advanced renal disease, on hemodialysis  Peripheral artery disease, s/p L CEA 2016  Given the absence of inducible ischemia on most recent stress test in 2014, which was noted dobutamine stress echocardiogram, there is a possibility that he does not have any significant residual coronary disease.  Nevertheless, given the regional wall motion abnormalities, consideration for acute coronary syndrome is at least intermediate, and therefore warrants further investigation.  Reassuringly, he has not had a significant rise in his troponin although they are quite elevated.  This can be seen in the setting of hemodialysis.  Additionally, he does have  a shock state, which is not fully differentiated at this point, that could lead to a stress cardiomyopathy in itself.    The patient does warrant consideration for coronary angiogram though given the presence of an acute GI bleed, presumably, evaluating the upper GI tract for bleeding would be advisable prior to proceeding with coronary angiogram with eye towards intervention given the need for Plavix therapy.  Since her contraindications to any regulation at this time, we will await upper endoscopy prior to treatment.    In the meantime, I would avoid excessive inotropic stimulation that would be associated with her myocardial oxygen demand.  If this is a stress-induced code, recommended pure vasoconstrictive therapy to prevent the development  of a dynamic outflow obstruction.    Recommendations:  1.  Consider upper endoscopy to evaluate source of bleeding given possible need for intervention  2.  Continue to trend troponins.  3.  Hold antiplatelet therapy and anticoagulation pending upper endoscopy.  4.  Continue to treat shock state as you are[AR1.1]    ADDENDUM: Decision made to move towards comfort cares.  Would not consider catheterization.  We will sign off.[AR1.2]    Active Problems:    Acute respiratory failure (H)    Omer Holden MD  Text Page     Code Status    Full Code    Reason for Consult   Reason for consult: Evaluation of new onset cardiomyopathy    Primary Care Physician   Shaila Silverman    Chief Complaint   Shock    Unable to obtain a history from the patient due to critical condition, intubation and sedation    History of Present Illness   Bean Croft is a 78 year old male who presents with shortness of breath.  Unfortunately, history is obtained solely through the chart as the patient is intubated.  Reportedly has been experiencing weakness and fatigue and had a recent decrease in her his diuretic dose.  He was intubated approximately 3 PM yesterday.  He was hypotensive to the 70s-80s.  He had been started on norepinephrine overnight.  He is covered with broad-spectrum antibiotic therapy for shock.  Overnight, echocardiogram was obtained given elevated troponin level.  It was noted to be 28 yesterday from 10 day before.  Continues to rise to 33 today.  He was Hemoccult positive, and hemoglobin was as low as 6.1 from a baseline of 10.6.  He was transfused.  Gastroenterology was consulted.    Patient has a history of coronary disease.  He had an anterior myocardial infarction [place] in 1996, which is treated with streptokinase.  He then underwent PCI to the LAD in 1996.  He has a history of peripheral vascular disease including both femoral and carotid disease status post left carotid endarterectomy in February  2016.  His most recent stress test was a dobutamine echocardiogram in 2014 which did not reveal any inducible ischemia.  Medical history is otherwise relevant for end-stage renal disease on hemodialysis.          Past Medical History   I have reviewed this patient's medical history and updated it with pertinent information if needed.[AR1.1]   Past Medical History:   Diagnosis Date     Anemia     unspecified     Arthritis     generalized     Carotid artery stenosis      CHF (congestive heart failure) (H)      Chronic kidney disease     Stage IV     Coronary artery disease      Dementia      Diabetes mellitus (H)     Non-insulin Dependent     Diabetic retinopathy (H)      Diverticulitis 7/2016     DM (diabetes mellitus screen)      Enlarged prostate      Gastro-oesophageal reflux disease      Gout      Hearing loss      History of left-sided carotid endarterectomy 4/19/16     Hyperlipidaemia      Hypertension      Low back pain      Peripheral vascular disease (H)      Pulmonary nodule      Seborrheic keratosis      Sleep apnea     stated by patient - not official diagnosis[AR1.3]       Past Surgical History   I have reviewed this patient's surgical history and updated it with pertinent information if needed.[AR1.1]  Past Surgical History:   Procedure Laterality Date     CORONARY ANGIOGRAPHY ADULT ORDER  1996    ???CLAIRE to mid LAD, initial obstruction 95%     CREATE FISTULA ARTERIOVENOUS UPPER EXTREMITY Left 5/31/2016    Procedure: CREATE FISTULA ARTERIOVENOUS UPPER EXTREMITY;  Surgeon: Batsheva Wright MD;  Location:  OR     ENDARTERECTOMY CAROTID Left 4/19/2016    Procedure: ENDARTERECTOMY CAROTID;  Surgeon: Batsheva Wright MD;  Location:  OR     REPAIR FISTULA ARTERIOVENOUS UPPER EXTREMITY Left 11/15/2016    Procedure: REPAIR FISTULA ARTERIOVENOUS UPPER EXTREMITY;  Surgeon: Tae Hendrix MD;  Location:  OR     TONSILLECTOMY, ADENOIDECTOMY, COMBINED[AR1.3]         Prior to Admission  Medications   Prior to Admission Medications   Prescriptions Last Dose Informant Patient Reported? Taking?   Multiple Vitamins-Minerals (DIALYVITE 800/ULTRA D) TABS  Pharmacy Yes Yes   Sig: Take 1 tablet by mouth daily   allopurinol (ZYLOPRIM) 100 MG tablet  Pharmacy No Yes   Sig: Take 1 tablet (100 mg) by mouth daily   amLODIPine (NORVASC) 10 MG tablet  Pharmacy No Yes   Sig: Take 1 tablet (10 mg) by mouth daily   aspirin 81 MG EC tablet Unknown at Unknown time  Yes No   Sig: Take 81 mg by mouth daily   docusate sodium (COLACE) 100 MG capsule Unknown at Unknown time  Yes No   Sig: Take 100 mg by mouth 2 times daily   donepezil (ARICEPT) 5 MG tablet  Pharmacy Yes Yes   Sig: Take 1 tablet by mouth daily   flurbiprofen (OCUFEN) 0.03 % ophthalmic solution  Pharmacy Yes Yes   Sig: Place 1 drop Into the left eye every 8 hours   furosemide (LASIX) 80 MG tablet  Pharmacy No Yes   Sig: Take 1 tablet (80 mg) by mouth daily On non-dialysis days   hydrALAZINE (APRESOLINE) 100 MG TABS tablet  Pharmacy No Yes   Sig: TAKE 1 TABLET(100 MG) BY MOUTH THREE TIMES DAILY   metoprolol (TOPROL-XL) 50 MG 24 hr tablet  Pharmacy No Yes   Sig: Take 1 tablet (50 mg) by mouth daily   omeprazole (PRILOSEC) 20 MG CR capsule  Pharmacy No Yes   Sig: TAKE 1 CAPSULE(20 MG) BY MOUTH DAILY   simvastatin (ZOCOR) 40 MG tablet  Pharmacy No Yes   Sig: TAKE 1 TABLET(40 MG) BY MOUTH AT BEDTIME      Facility-Administered Medications: None     Allergies   Allergies   Allergen Reactions     Ace Inhibitors Cough     Penicillins      Other reaction(s): *Unknown  Pt was given it for rheumatic fever. MD told him to never take it again per pt statement. ? Allergy.       Social History   I have reviewed this patient's social history and updated it with pertinent information if needed. Bean Croft[AR1.1]  reports that he quit smoking about 2 years ago. His smoking use included Cigarettes. He started smoking about 62 years ago. He smoked 0.50 packs per day. He  has never used smokeless tobacco. He reports that he does not drink alcohol or use illicit drugs.[AR1.3]    Family History   I have reviewed this patient's family history and updated it with pertinent information if needed.[AR1.1]   Family History   Problem Relation Age of Onset     C.A.D. Father 52     MI     C.A.D. Sister      Other - See Comments Mother 92[AR1.4]       Review of Systems   Review of systems not obtained due to patient factors - critical condition, intubation and sedation    Physical Exam   Temp: 98.1  F (36.7  C) Temp src: Esophageal BP: 101/57 Pulse: 97 Heart Rate: 69 Resp: 17 SpO2: 100 % O2 Device: Mechanical Ventilator    Vital Signs with Ranges  Temp:  [94.1  F (34.5  C)-99.1  F (37.3  C)] 98.1  F (36.7  C)  Pulse:  [93-97] 97  Heart Rate:  [64-98] 69  Resp:  [13-48] 17  BP: ()/(46-67) 101/57  FiO2 (%):  [40 %-100 %] 40 %  SpO2:  [90 %-100 %] 100 %  139 lbs 1.76 oz    Constitutional: No apparent distress.   Eyes: No xanthelasma or conjunctivitis  HEENT: Moist oral mucosa  Respiratory: Intubated  Cardiovascular: Borderline tachycardia, without significant murmur second be appreciated.  Lymph/Hematologic: No purpura or petechiae.  Skin: No stasis dermatitis. No major rashes.  Extremities: Mild lower extremity edema.  Neurologic: Sedated and unable to evaluate.  Psychiatric: Intubated and sedated.      Data[AR1.1]   Results for orders placed or performed during the hospital encounter of 08/10/18 (from the past 24 hour(s))   CBC with platelets + differential   Result Value Ref Range    WBC 16.8 (H) 4.0 - 11.0 10e9/L    RBC Count 1.82 (L) 4.4 - 5.9 10e12/L    Hemoglobin 6.1 (LL) 13.3 - 17.7 g/dL    Hematocrit 19.0 (L) 40.0 - 53.0 %     (H) 78 - 100 fl    MCH 33.5 (H) 26.5 - 33.0 pg    MCHC 32.1 31.5 - 36.5 g/dL    RDW 15.1 (H) 10.0 - 15.0 %    Platelet Count 314 150 - 450 10e9/L    Diff Method Automated Method     % Neutrophils 90.1 %    % Lymphocytes 7.2 %    % Monocytes 1.9 %    %  Eosinophils 0.1 %    % Basophils 0.1 %    % Immature Granulocytes 0.6 %    Nucleated RBCs 0 0 /100    Absolute Neutrophil 15.1 (H) 1.6 - 8.3 10e9/L    Absolute Lymphocytes 1.2 0.8 - 5.3 10e9/L    Absolute Monocytes 0.3 0.0 - 1.3 10e9/L    Absolute Eosinophils 0.0 0.0 - 0.7 10e9/L    Absolute Basophils 0.0 0.0 - 0.2 10e9/L    Abs Immature Granulocytes 0.1 0 - 0.4 10e9/L    Absolute Nucleated RBC 0.0    Comprehensive metabolic panel   Result Value Ref Range    Sodium 138 133 - 144 mmol/L    Potassium 4.2 3.4 - 5.3 mmol/L    Chloride 100 94 - 109 mmol/L    Carbon Dioxide 20 20 - 32 mmol/L    Anion Gap 18 (H) 3 - 14 mmol/L    Glucose 230 (H) 70 - 99 mg/dL    Urea Nitrogen 104 (H) 7 - 30 mg/dL    Creatinine 6.88 (H) 0.66 - 1.25 mg/dL    GFR Estimate 8 (L) >60 mL/min/1.7m2    GFR Estimate If Black 9 (L) >60 mL/min/1.7m2    Calcium 8.0 (L) 8.5 - 10.1 mg/dL    Bilirubin Total 0.4 0.2 - 1.3 mg/dL    Albumin 3.0 (L) 3.4 - 5.0 g/dL    Protein Total 5.9 (L) 6.8 - 8.8 g/dL    Alkaline Phosphatase 58 40 - 150 U/L    ALT 16 0 - 70 U/L    AST 41 0 - 45 U/L   BNP   Result Value Ref Range    N-Terminal Pro BNP Inpatient 12437 (H) 0 - 1800 pg/mL   Troponin I   Result Value Ref Range    Troponin I ES 5.992 (HH) 0.000 - 0.045 ug/L   ABO/Rh type and screen   Result Value Ref Range    Units Ordered 2     ABO O     RH(D) Pos     Antibody Screen Neg     Test Valid Only At [facility]        Specimen Expires 08/13/2018     Crossmatch Red Blood Cells    Blood component   Result Value Ref Range    Unit Number [id number]     Blood Component Type Red Blood Cells Leukocyte Reduced     Division Number 00     Status of Unit Released to care unit     Blood Product Code [id number]     Unit Status ISS    Blood component   Result Value Ref Range    Unit Number [id number]     Blood Component Type Red Blood Cells Leukocyte Reduced     Division Number 00     Status of Unit Released to care unit     Blood Product Code [id number]     Unit  Status ISS    Cortisol   Result Value Ref Range    Cortisol Serum 39.6 (H) 4 - 22 ug/dL   TSH with free T4 reflex   Result Value Ref Range    TSH 0.91 0.40 - 4.00 mU/L   XR Chest Port 1 View    Narrative    XR CHEST PORT 1 VW 8/10/2018 3:48 PM    COMPARISON: 10/27/2016    HISTORY: Respiratory distress, endotracheal tube positioning.      Impression    IMPRESSION: Endotracheal tube tip approximately 4 cm above the amina.  Patchy opacities over both lungs, RIGHT worse than LEFT, edema versus  atypical infection. No pneumothorax seen on either side.    REY PRUITT MD   UA with Microscopic   Result Value Ref Range    Color Urine Yellow     Appearance Urine Slightly Cloudy     Glucose Urine 300 (A) NEG^Negative mg/dL    Bilirubin Urine Negative NEG^Negative    Ketones Urine Negative NEG^Negative mg/dL    Specific Gravity Urine 1.012 1.003 - 1.035    Blood Urine Negative NEG^Negative    pH Urine 5.0 5.0 - 7.0 pH    Protein Albumin Urine 30 (A) NEG^Negative mg/dL    Urobilinogen mg/dL Normal 0.0 - 2.0 mg/dL    Nitrite Urine Negative NEG^Negative    Leukocyte Esterase Urine Negative NEG^Negative    Source Catheterized Urine     WBC Urine <1 0 - 5 /HPF    RBC Urine 1 0 - 2 /HPF    Amorphous Crystals Few (A) NEG^Negative /HPF   Urine Culture   Result Value Ref Range    Specimen Description Catheterized Urine     Special Requests Specimen received in preservative     Culture Micro PENDING    EKG 12 lead   Result Value Ref Range    Interpretation ECG Click View Image link to view waveform and result    Blood culture   Result Value Ref Range    Specimen Description Blood Right Arm     Special Requests Aerobic and anaerobic bottles received     Culture Micro No growth after 7 hours    Blood culture   Result Value Ref Range    Specimen Description Blood Right Hand     Special Requests Aerobic and anaerobic bottles received     Culture Micro No growth after 7 hours    Occult blood stool (ED Lab POCT Only 3-11)   Result Value Ref  "Range    Occult Blood Positive (A) NEG^Negative   Lactic acid   Result Value Ref Range    Lactic Acid 1.8 0.7 - 2.0 mmol/L   Glucose by meter   Result Value Ref Range    Glucose 199 (H) 70 - 99 mg/dL   Lactic acid whole blood   Result Value Ref Range    Lactic Acid 1.1 0.7 - 2.0 mmol/L   Basic metabolic panel   Result Value Ref Range    Sodium 138 133 - 144 mmol/L    Potassium 3.7 3.4 - 5.3 mmol/L    Chloride 100 94 - 109 mmol/L    Carbon Dioxide 27 20 - 32 mmol/L    Anion Gap 11 3 - 14 mmol/L    Glucose 161 (H) 70 - 99 mg/dL    Urea Nitrogen 53 (H) 7 - 30 mg/dL    Creatinine 3.28 (H) 0.66 - 1.25 mg/dL    GFR Estimate 18 (L) >60 mL/min/1.7m2    GFR Estimate If Black 22 (L) >60 mL/min/1.7m2    Calcium 8.3 (L) 8.5 - 10.1 mg/dL   CBC with platelets   Result Value Ref Range    WBC 26.1 (H) 4.0 - 11.0 10e9/L    RBC Count 2.16 (L) 4.4 - 5.9 10e12/L    Hemoglobin 7.2 (L) 13.3 - 17.7 g/dL    Hematocrit 22.2 (L) 40.0 - 53.0 %     (H) 78 - 100 fl    MCH 33.3 (H) 26.5 - 33.0 pg    MCHC 32.4 31.5 - 36.5 g/dL    RDW 14.8 10.0 - 15.0 %    Platelet Count 380 150 - 450 10e9/L   Lipase   Result Value Ref Range    Lipase 301 73 - 393 U/L   Procalcitonin   Result Value Ref Range    Procalcitonin 0.50 ng/ml   Troponin I   Result Value Ref Range    Troponin I ES 10.963 (HH) 0.000 - 0.045 ug/L   Central line    Narrative    Anat Wallace MD     8/10/2018  7:50 PM  Central line  Date/Time: 8/10/2018 7:44 PM  Performed by: ANAT WALLACE  Authorized by: DAVY LOBATO   Consent: The procedure was performed in an emergent situation. Verbal   consent not obtained. Written consent not obtained.  Required items: required blood products, implants, devices, and special   equipment available  Patient identity confirmed: anonymous protocol, patient   vented/unresponsive  Time out: Immediately prior to procedure a \"time out\" was called to verify   the correct patient, procedure, equipment, support staff and site/side "   marked as required.  Indications: vascular access  Anesthesia: local infiltration    Anesthesia:  Local Anesthetic: lidocaine 1% without epinephrine  Anesthetic total: 0.1 mL    Sedation:  Patient sedated: no  Preparation: skin prepped with betadine  Location details: right internal jugular  Patient position: flat  Catheter type: triple lumen  Pre-procedure: landmarks identified  Ultrasound guidance: yes  Number of attempts: 1  Successful placement: yes  Post-procedure: line sutured  Assessment: blood return through all parts  Patient tolerance: Patient tolerated the procedure well with no immediate   complications     Blood culture   Result Value Ref Range    Specimen Description Blood     Culture Micro Canceled, Test credited  Duplicate request      Blood culture   Result Value Ref Range    Specimen Description Blood     Culture Micro Canceled, Test credited  Duplicate request      XR Chest Port 1 View    Narrative    CHEST PORTABLE ONE VIEW 8/10/2018 8:07 PM     HISTORY: Check central line.    COMPARISON: Chest x-ray 8/10/2018.      Impression    IMPRESSION:   1. New right neck central line tip at the superior vena cava.  Nasogastric tube is identified. The ET tube tip is difficult to  visualize.  2. Bilateral vascular and interstitial prominence again noted. This  may be mildly improved on the right but is unchanged on the left.    SOBIA RICHARDSON MD   CT Chest Abdomen Pelvis w/o Contrast    Narrative    CT CHEST/ABDOMEN/PELVIS WITHOUT CONTRAST  8/10/2018 10:02 PM    HISTORY: Occult blood positive stool. Patient with congestive heart  failure, ESRD with questionable sepsis and acute respiratory failure.    TECHNIQUE: CT scan obtained of the chest, abdomen, and pelvis without  IV contrast. Radiation dose for this scan was reduced using automated  exposure control, adjustment of the mA and/or kV according to patient  size, or iterative reconstruction technique.    COMPARISON:  CT abdomen and pelvis  7/23/2016.    FINDINGS:  Chest: The patient is intubated. Nasogastric tube in place. Thoracic  aortic and coronary artery calcifications. Small bilateral pleural  effusions. Bibasilar atelectasis. There are patchy areas of  consolidation suggested at the bilateral lower lobes and posterior  left upper lobe adjacent to the major fissure. Mild emphysematous  changes. Some respiratory motion limits detail assessment of the  lungs. There are a few scattered mildly prominent lymph nodes in the  mediastinum. One of the larger lymph nodes is 2.0 x 1.2 cm distal  right paratracheal location series 2 image 28. There are other  examples.    Abdomen/pelvis: Diffuse vascular calcifications. House catheter  decompresses the bladder. Small gas in the bladder. Prostate is  prominent measuring 4.7 cm. No bowel obstruction. Colonic  diverticulosis. No convincing diverticulitis or other bowel  inflammation. Normal appendix.    Within the limits of unenhanced scanning, the liver, gallbladder,  spleen, and pancreas shows no significance. Nodular thickening is  hypodense involving the left adrenal with internal density measurement  of 1.8 Hounsfield units. This nodule is 1.7 cm series 2 image 63 and  is consistent with an adenoma. It appears stable. Stable minimal  thickening of the right adrenal. Bilateral renal cysts again noted.  One of the largest is at the upper right kidney measuring 8.6 cm,  stable image 59. There are other cyst examples in the kidneys. No  enlarged lymph nodes in the abdomen or pelvis. Bone windows of the  chest, abdomen, and pelvis do not show any convincing destructive bony  lesion.      Impression    IMPRESSION:  1. Bilateral patchy areas of consolidation at the lung bases and  posterior left upper lobe. Correlate with evidence of multifocal  pneumonia. Lung base opacities may also represent atelectasis.  2. Small bibasilar pleural effusions.  3. A few nonspecific mildly prominent mediastinal lymph nodes.  4.  Colonic diverticulosis without diverticulitis. No acute  inflammatory change of the bowel. Please note that this examination  cannot exclude any underlying colonic lesions.  5. Enlarged prostate.    SOBIA RICHARDSON MD   Troponin I   Result Value Ref Range    Troponin I ES 28.173 (HH) 0.000 - 0.045 ug/L   CBC (AM Draw)   Result Value Ref Range    WBC 15.1 (H) 4.0 - 11.0 10e9/L    RBC Count 2.78 (L) 4.4 - 5.9 10e12/L    Hemoglobin 8.8 (L) 13.3 - 17.7 g/dL    Hematocrit 26.8 (L) 40.0 - 53.0 %    MCV 96 78 - 100 fl    MCH 31.7 26.5 - 33.0 pg    MCHC 32.8 31.5 - 36.5 g/dL    RDW 18.6 (H) 10.0 - 15.0 %    Platelet Count 232 150 - 450 10e9/L   Basic metabolic panel (AM Draw)   Result Value Ref Range    Sodium 138 133 - 144 mmol/L    Potassium 4.2 3.4 - 5.3 mmol/L    Chloride 99 94 - 109 mmol/L    Carbon Dioxide 28 20 - 32 mmol/L    Anion Gap 11 3 - 14 mmol/L    Glucose 144 (H) 70 - 99 mg/dL    Urea Nitrogen 42 (H) 7 - 30 mg/dL    Creatinine 3.70 (H) 0.66 - 1.25 mg/dL    GFR Estimate 16 (L) >60 mL/min/1.7m2    GFR Estimate If Black 19 (L) >60 mL/min/1.7m2    Calcium 7.6 (L) 8.5 - 10.1 mg/dL   Magnesium (AM Draw)   Result Value Ref Range    Magnesium 1.7 1.6 - 2.3 mg/dL   Phosphorus (AM Draw)   Result Value Ref Range    Phosphorus 4.5 2.5 - 4.5 mg/dL   EKG 12-lead, tracing only   Result Value Ref Range    Interpretation ECG Click View Image link to view waveform and result    Blood gas arterial with oxyhemoglobin   Result Value Ref Range    pH Arterial 7.45 7.35 - 7.45 pH    pCO2 Arterial 41 35 - 45 mm Hg    pO2 Arterial 118 (H) 80 - 105 mm Hg    Bicarbonate Arterial 28 21 - 28 mmol/L    Oxyhemoglobin Arterial 98 92 - 100 %    Base Excess Art 3.8 mmol/L   Hemoglobin   Result Value Ref Range    Hemoglobin 8.6 (L) 13.3 - 17.7 g/dL   Vancomycin level   Result Value Ref Range    Vancomycin Level 11.1 mg/L   Troponin I   Result Value Ref Range    Troponin I ES 32.861 (HH) 0.000 - 0.045 ug/L   Glucose by meter   Result Value Ref Range     Glucose 158 (H) 70 - 99 mg/dL[AR1.5]                Revision History        User Key Date/Time User Provider Type Action    > AR1.2 8/11/2018 12:53 PM Omer Holden MD Physician Sign     AR1.4 8/11/2018 10:09 AM Omer Holden MD Physician      AR1.5 8/11/2018 10:08 AM Omer Holden MD Physician      AR1.3 8/11/2018 10:00 AM Omer Holden MD Physician      AR1.1 8/11/2018  9:52 AM Omer Holden MD Physician             Consults by Tom Hill MD at 8/10/2018  7:02 PM     Author:  Tom Hill MD Service:  ICU Author Type:  Physician    Filed:  8/10/2018  7:02 PM Date of Service:  8/10/2018  7:02 PM Creation Time:  8/10/2018  6:39 PM    Status:  Signed :  Tom Hill MD (Physician)         [facility]   CRITICAL CARE ADMISSION/CONSULTATION    Bean Croft MRN: [id number]  1940  Date of Admission:8/10/2018          HPI   Bean Croft IS a 78 year old male admitted on 8/10/2018 with significant history for ESRD on HD, CHF, CAD, Dm2, GERD who presents with acute onset dyspnea.   - He was intubated in the field due to respiratory failure. History taken from the chart.   - Apparently is HD dependent however does make urine. There was some reduction in his lasix a few days ago. Today he complained of increasing respiratory distress.  - In the field, he was intubated however the tube was subglottic. Additionally noted hypotension.   - In the ED, the ETT was repositioned and BP improved. He was found to have Hb 6.1 and guiac positive. Abd was soft, nontender. I/O was placed for dopamine infusion which was subsequently weaned off after ETT repositioning.            Past Medical History:      Past Medical History:   Diagnosis Date     Anemia     unspecified     Arthritis     generalized     Carotid artery stenosis      CHF (congestive heart failure) (H)      Chronic kidney disease     Stage IV     Coronary artery  disease      Dementia      Diabetes mellitus (H)     Non-insulin Dependent     Diabetic retinopathy (H)      Diverticulitis 7/2016     DM (diabetes mellitus screen)      Enlarged prostate      Gastro-oesophageal reflux disease      Gout      Hearing loss      History of left-sided carotid endarterectomy 4/19/16     Hyperlipidaemia      Hypertension      Low back pain      Peripheral vascular disease (H)      Pulmonary nodule      Seborrheic keratosis      Sleep apnea     stated by patient - not official diagnosis             Past Surgical History:      Past Surgical History:   Procedure Laterality Date     CORONARY ANGIOGRAPHY ADULT ORDER  1996    ???CLAIRE to mid LAD, initial obstruction 95%     CREATE FISTULA ARTERIOVENOUS UPPER EXTREMITY Left 5/31/2016    Procedure: CREATE FISTULA ARTERIOVENOUS UPPER EXTREMITY;  Surgeon: Batsheva Wright MD;  Location:  OR     ENDARTERECTOMY CAROTID Left 4/19/2016    Procedure: ENDARTERECTOMY CAROTID;  Surgeon: Batsheva Wright MD;  Location:  OR     REPAIR FISTULA ARTERIOVENOUS UPPER EXTREMITY Left 11/15/2016    Procedure: REPAIR FISTULA ARTERIOVENOUS UPPER EXTREMITY;  Surgeon: Tae Hendrix MD;  Location:  OR     TONSILLECTOMY, ADENOIDECTOMY, COMBINED              Social History:     Social History   Substance Use Topics     Smoking status: Former Smoker     Packs/day: 0.50     Types: Cigarettes     Start date: 1/1/1956     Quit date: 1/25/2016     Smokeless tobacco: Never Used      Comment: a pack every 4 days     Alcohol use No            Family History:     Family History   Problem Relation Age of Onset     C.A.D. Father 52     MI     C.A.D. Sister      Other - See Comments Mother 92             Allergies:   Please see allergy list which was reviewed this admission.         Medications:       sodium chloride 0.9%  250 mL Intravenous Once in dialysis     sodium chloride 0.9%  300 mL Hemodialysis Machine Once     epoetin juaquin (EPOGEN,PROCRIT) inj  ESRD  6,000 Units Intravenous Once in dialysis     - MEDICATION INSTRUCTIONS -   Does not apply Once     sodium chloride 0.9%         Review of Systems:   Unable to assess due to critical illness         Physical Examination:   Temp:  [94.1  F (34.5  C)-96.4  F (35.8  C)] 95  F (35  C)  Pulse:  [93] 93  Heart Rate:  [87-96] 96  Resp:  [13-27] 20  BP: ()/(53-67) 94/58  FiO2 (%):  [40 %-100 %] 50 %  SpO2:  [90 %-100 %] 98 %    Intake/Output Summary (Last 24 hours) at 08/10/18 1839  Last data filed at 08/10/18 1700   Gross per 24 hour   Intake              100 ml   Output               30 ml   Net               70 ml     Wt Readings from Last 4 Encounters:   08/10/18 64 kg (141 lb 1.5 oz)   07/26/18 63.5 kg (140 lb)   07/09/18 63.3 kg (139 lb 9.6 oz)   06/11/18 62.6 kg (138 lb 1.6 oz)     BP - Mean:  [70-88] 82  Ventilation Mode: CMV/AC  (Continuous Mandatory Ventilation/ Assist Control)  FiO2 (%): 50 %  Rate Set (breaths/minute): 14 breaths/min  Tidal Volume Set (mL): 500 mL  PEEP (cm H2O): 10 cmH2O  Oxygen Concentration (%): 40 %  Resp: 20  No lab results found in last 7 days.    GEN: intubated, sedated  HEENT: head ncat, sclera anicteric, OP patent, trachea midline   PULM: unlabored synchronous with vent, clear anteriorly    CV/COR: RRR S1S2 no gallop,  No rub, no murmur  ABD: soft nontender, hypoactive bowel sounds, no mass  EXT:  Edema   warm  NEURO: grossly intact  SKIN: no obvious rash    Bedside US: IVC is collapsed. LV is hyperdynamic. No pericardial effusion. No pleural effusion.      Assessment and plan :     Neurology/Psychiatry/Pain/Sedation:   1. Sedation for vent synchrony. Keep RASS -2 to -4 using propofol and prn fentanyl    Cardiovascular/Hemodynamics/Pulmonary/Ventilator Management/Renal/Fluids/Electrolytes/ID/Endo/Heme-Onc  1. Acute respiratory failure presumed 2/2 volume overload with acute vs chronic anemia in setting of CHF and ESRD on HD. Hemodynamically stable off of pressors. Lactic acid  1.8. Easy to oxygenate and ventilate. Presumably, the cause for his respiratory failure is volume overload and will get emergent HD tonight. However, his hypotension, bedside US assessment and Hb 6.1 seems to be antagonistic to hypervolemic state. Given positive guaiac, we will get CT a/p tonight to evaluate and Hb q8hrs tonight. Other things to consider is acute coronary event, sepsis, adrenal insufficiency from etomidate and/or non-cardiogenic pulm edema (i.e. ALI, alveolar hemorrhage).   - for completeness, will obtain TTE, FT4/TSH, TnTq8, and random cortisol level.   2. ESRD. HD per renal  3. ICU glucose protocol  4. Mechanical DVT ppx.  5. Empiric broad spectrum abx pending cultures    GI/Nutrition:   1. NPO. PPI for ppx    Disposition/Code Status/Other  1. Critically ill. Supportive care  2. Code: Full     ICU Prophylaxis:   1. DVT: mechanical  2. VAP: HOB 30 degrees, chlorhexidine rinse  3. Stress Ulcer: PPI/H2 blocker  4. Restraints: Nonviolent soft two point restraints required and necessary for patient safety and continued cares and good effect as patient continues to pull at necessary lines, tubes despite education and distraction. Will readdress daily.   5. IV Access - central access required and necessary for continued patient cares  6. Feeding - npo    I have personally reviewed the daily labs, imaging studies, cultures and discussed the case with referring physician and consulting physicians.     This patient is critically ill and I have provided 30 minutes of critical care time (excluding procedures) on August 10, 2018.     Tom Hill MD   Critical Care Staff  [phone]         Data:   All data and imaging reviewed     ROUTINE ICU LABS (Last four results)  CMP  Recent Labs  Lab 08/10/18  1539      POTASSIUM 4.2   CHLORIDE 100   CO2 20   ANIONGAP 18*   *   *   CR 6.88*   GFRESTIMATED 8*   GFRESTBLACK 9*   TAMMY 8.0*   PROTTOTAL 5.9*   ALBUMIN 3.0*   BILITOTAL 0.4   ALKPHOS 58   AST 41    ALT 16     CBC  Recent Labs  Lab 08/10/18  1539   WBC 16.8*   RBC 1.82*   HGB 6.1*   HCT 19.0*   *   MCH 33.5*   MCHC 32.1   RDW 15.1*        INRNo lab results found in last 7 days.  Arterial Blood GasNo lab results found in last 7 days.    All cultures:  No results for input(s): CULT in the last 168 hours.  Recent Results (from the past 24 hour(s))   XR Chest Port 1 View    Narrative    XR CHEST PORT 1 VW 8/10/2018 3:48 PM    COMPARISON: 10/27/2016    HISTORY: Respiratory distress, endotracheal tube positioning.      Impression    IMPRESSION: Endotracheal tube tip approximately 4 cm above the amina.  Patchy opacities over both lungs, RIGHT worse than LEFT, edema versus  atypical infection. No pneumothorax seen on either side.    REY PRUITT MD[RC1.1]                        Revision History        User Key Date/Time User Provider Type Action    > RC1.1 8/10/2018  7:02 PM Tom Hill MD Physician Sign                     Progress Notes - Physician (Notes from 08/11/18 through 08/14/18)      Progress Notes by González Motta at 8/14/2018 10:43 AM     Author:  González Motta Service:  Social Work Author Type:      Filed:  8/14/2018 10:47 AM Date of Service:  8/14/2018 10:43 AM Creation Time:  8/14/2018 10:43 AM    Status:  Signed :  González Motta ()         SW  D) Patient is stable for discharge today to [facility] with hospice enrollment.  PAS-RR  Per DHS regulation, KATELYNN completed and submitted PAS-RR to [place] Board on Aging Direct Connect via the Senior LinkAge Line.  PAS-RR confirmation # is :[id number]   Questions may be directed to Senior LinkAge Line at #[phone], option #4 for PAS-RR staff.  [facility] [place], operated by [facility] stretcher ride at 1300. Stretcher is needed due to patient's comfort care status and inability to tolerate a wheelchair ride. The PCS form was completed and faxed. Will fax orders and the PAS to [facility]. Updated patient's  spouse, Carmina and daughter Anamaria.    A) Spouse and daughter agree to this plan. They are aware of the $5000 check required upon admission.    P) Discharge to [facility] today at 1300. [facility] enrollment set at 1530[RH1.1]     Revision History        User Key Date/Time User Provider Type Action    > RH1.1 8/14/2018 10:47 AM González Motta  Sign            Progress Notes by Diann Babb RN at 8/14/2018 10:34 AM     Author:  Skinny, Diann, RN Service:  Hospice Author Type:  Registered Nurse    Filed:  8/14/2018 10:34 AM Date of Service:  8/14/2018 10:34 AM Creation Time:  8/14/2018 10:34 AM    Status:  Signed :  Diann Babb RN (Registered Nurse)          Hospice: Met with spouse Carmina to sign the hospice consent forms. Plan is for the pt to be discharged today to [facility] today at 1p. Med recommendations in previous note. POLST completed and placed on front of chart for signature. The  Hospice admission nurse will complete the admission today at 3:30p. Thank you. Diann Babb RN, BSN   Hospice Admission nurse  [phone][HR1.1]       Revision History        User Key Date/Time User Provider Type Action    > HR1.1 8/14/2018 10:34 AM Diann Babb, RN Registered Nurse Sign            Progress Notes by Ellis Esquivel MD at 8/13/2018  7:15 PM     Author:  Ellis Esquivel MD Service:  Hospitalist Author Type:  Physician    Filed:  8/13/2018  7:26 PM Date of Service:  8/13/2018  7:15 PM Creation Time:  8/13/2018  7:15 PM    Status:  Signed :  Ellis Esquivel MD (Physician)         [facility]    Hospitalist Progress Note[BJ1.1]    Assessment & Plan[BJ1.2]   78-year-old gentleman who has a significant history for renal disease on hemodialysis, has a history of coronary artery disease with an MI last year.  He has a history of CHF, type 2 diabetes mellitus, gastroesophageal reflux, hypertension and peripheral vascular disease who was  admitted when he experienced acute shortness of breath.  The wife reports that he was at home when all of a sudden, they got back from the doctor's office and he went into the bathroom and developed acute shortness of breath and weakness and the ambulance had to be called.  He was brought to the hospital and intubated.  He was also hypotensive and was noticed to have a hemoglobin of 6.1 with guaiac positive stools.     He has been extubated per his requests, made comfort care and DNR/DNI. Plan is to discharge him tomorrow on hospice to nursing home in [place] if not imminent death.    NSTEMI  Acute respiratory failure secondary to fluid overload in setting of acute systolic CHF exacerbation and ESRD on dialysis.   Acute GI bleed with acute blood loss anemia and chronic anemia associated with renal failure  Community acquired pneumonia with unidentified organism  Acute respiratory failure presumed 2/2 volume overload with acute vs chronic anemia in setting of CHF and ESRD on HD. Unable to tolerate fluid removal by HD. Plan to proceed with GI bleeding w/u in addition to possible coronary w/u; however, family requested a conservative plan. Changed to comfort care and extubated 8/11.  -dilaudid IV PRN for pain  -appreciate palliative care assistance with medications  -SW looking into possible discharge to nursing home in [place] with hospice on 8/14/18    # Pain Assessment:[BJ1.1]  Current Pain Score 8/13/2018   Patient currently in pain? sleeping: patient not able to self report   Pain score (0-10) -   Pain location -   Pain descriptors -   CPOT pain score -[BJ1.2]    - Bean is unable to participate in a plan for pain management; however, the plan  was discussed in a collaborative fashion with his spouse and daughter.   - Please see the plan for pain management as documented above    DVT Prophylaxis: Not indicated, comfort care  Code Status:[BJ1.1] DNR/DNI[BJ1.2]    Disposition: Expected discharge tomorrow  to hospice at nursing home if medically stable.[BJ1.1]    Ellis Esquivel[BJ1.2], MD  [phone] (C)  [phone] (P)  Text Page (7 am to 6 pm)[BJ1.1]    Interval History[BJ1.2]   Patient has been sleeping since 3 AM and has only interacted minimally with some dilaudid given for pain.  Family at bedside and says he is comfortable and they are okay with discharge if he is stable tomorrow.    -Data reviewed today: I reviewed all new labs and imaging results over the last 24 hours. I personally reviewed no images or EKG's today.[BJ1.1]    Physical Exam             Resp: 16       Vitals:    08/10/18 1610 08/10/18 1745 08/11/18 0316   Weight: 64 kg (141 lb 1.5 oz) 61.9 kg (136 lb 7.4 oz) 63.1 kg (139 lb 1.8 oz)[BJ1.2]     Vital Signs with Ranges[BJ1.1]  Resp:  [16] 16  I/O last 3 completed shifts:  In: -   Out: 50 [Urine:50][BJ1.2]    Constitutional: Sleeping comfortably[BJ1.1]    Medications[BJ1.2]   Only PRN medications:[BJ1.1] acetaminophen, acetaminophen, atropine, [START ON 8/16/2018] bisacodyl, glycopyrrolate, haloperidol, HYDROmorphone, LORazepam, LUBRIDERM, senna-docusate[BJ1.3]    Data     Recent Labs  Lab 08/11/18  1700 08/11/18  1006 08/11/18  0900 08/11/18  0500 08/11/18  0300 08/10/18  1855 08/10/18  1539   WBC  --   --   --   --  15.1* 26.1* 16.8*   HGB 8.3* 8.9*  --  8.6* 8.8* 7.2* 6.1*   MCV  --   --   --   --  96 103* 104*   PLT  --   --   --   --  232 380 314   NA  --   --   --   --  138 138 138   POTASSIUM  --   --   --   --  4.2 3.7 4.2   CHLORIDE  --   --   --   --  99 100 100   CO2  --   --   --   --  28 27 20   BUN  --   --   --   --  42* 53* 104*   CR  --   --   --   --  3.70* 3.28* 6.88*   ANIONGAP  --   --   --   --  11 11 18*   TAMMY  --   --   --   --  7.6* 8.3* 8.0*   GLC  --   --   --   --  144* 161* 230*   ALBUMIN  --   --   --   --   --   --  3.0*   PROTTOTAL  --   --   --   --   --   --  5.9*   BILITOTAL  --   --   --   --   --   --  0.4   ALKPHOS  --   --   --   --   --   --  58   ALT   "--   --   --   --   --   --  16   AST  --   --   --   --   --   --  41   LIPASE  --   --   --   --   --  301  --    TROPI 27.921*  --  32.861*  --  28.173* 10.963* 5.992*       No results found for this or any previous visit (from the past 24 hour(s)).[BJ1.2]       Revision History        User Key Date/Time User Provider Type Action    > BJ1.3 8/13/2018  7:26 PM Ellis Esquivel MD Physician Sign     BJ1.2 8/13/2018  7:25 PM Ellis Esquivel MD Physician      BJ1.1 8/13/2018  7:15 PM Ellis Esquivel MD Physician             Progress Notes by Shania Henley at 8/13/2018 11:52 AM     Author:  Shania Henley Service:  Spiritual Health Author Type:      Filed:  8/13/2018 12:00 PM Date of Service:  8/13/2018 11:52 AM Creation Time:  8/13/2018 11:53 AM    Status:  Signed :  Shania Henley ()         SPIRITUAL HEALTH SERVICES  Spiritual Assessment Progress Note  [facility] 88   met with pt's wife and daughter and had a lengthy conversation with me about pt's life - his work in construction, their family of 5 children (one with downs syndrome), having foreign exchange students live in their home and their multiple trips around the world in long-term.      Pt is Jehovah's witness and he has been anointed and communion has been offered.  No other Jehovah's witness needs at this time.    Pt has made his wishes known - stating when he came into the hospital that \" he wanted to die - he was ready\".   Family is comforted in knowing his wishes have been followed.   Family was tearful at times but stated that they often cope with humor.     listened to their stories and provided emotional support as they prepare for pt's EOL. .     SH continues to be available should needs arise.                                                                                                                                            Shania Henley M.Div., [facility]  Staff    Pager [phone][CW1.1]     Revision " History        User Key Date/Time User Provider Type Action    > CW1.1 8/13/2018 12:00 PM Shania Henley Sign            Progress Notes by Rex Haley MD at 8/12/2018  9:55 AM     Author:  Rex Haley MD Service:  Hospitalist Author Type:  Physician    Filed:  8/12/2018 12:59 PM Date of Service:  8/12/2018  9:55 AM Creation Time:  8/12/2018  9:55 AM    Status:  Addendum :  Rex Haley MD (Physician)         [facility]    Hospitalist Progress Note    Assessment & Plan   78-year-old gentleman who has a significant history for renal disease on hemodialysis, has a history of coronary artery disease with an MI last year.  He has a history of CHF, type 2 diabetes mellitus, gastroesophageal reflux, hypertension and peripheral vascular disease who was admitted when he experienced acute shortness of breath.  The wife reports that he was at home when all of a sudden, they got back from the doctor's office and he went into the bathroom and developed acute shortness of breath and weakness and the ambulance had to be called.  He was brought to the hospital and intubated.  He was also hypotensive and was noticed to have a hemoglobin of 6.1 with guaiac positive stools.     He has been extubated per his requests, made comfort care and DNR. Plan is to discharge him tomorrow on hospice to home. Palliative care/SW consult placed.    Neurology/Psychiatry/Pain/Sedation:   -comfort care orders in. Pain is controlled. On RA.       Cardiovascular/Hemodynamics/Pulmonary/Ventilator Management/Renal/Fluids/Electrolytes/ID/Endo/Heme-Onc  1. Acute respiratory failure presumed 2/2 volume overload with acute vs chronic anemia in setting of CHF and ESRD on HD. Remains hemodynamically stable. Unable to tolerate fluid removal by HD. Plan to proceed with GI bleeding w/u in addition to possible coronary w/u; however, family has requested a conservative plan. Apparently, he had living will that demonstrate that he  would not want any heroic measures with regard to mechanical ventilation and CPR. Is now comfort care, no dialysis or further interventions.   2. ESRD. No dialysis.         Disposition/Code Status/Other  1. Per wife and eldest daughter, the patient's preference would be DNR and no escalation of care. They have requested to keep him supported until his other 2 daughter's arrive. Ultimate plan would be terminal extubation to comfort care. Discussion was had with wife, daughter, RN, cardiology and GI who are in agreement. Was extubated 8/11, now comfort care.  2. Code: DNR[AK1.1]/DNI[AK1.2]    Rex Haley MD   Text Page (7am to 6pm)    Interval History   Extubated 8/11, on comfort care and DNR. Plan is discharge with hospice tomm to home.     -Data reviewed today: I reviewed all new labs and imaging results over the last 24 hours. I personally reviewed no images or EKG's today.    Physical Exam   Temp: 100  F (37.8  C) Temp src: Esophageal BP: 104/52   Heart Rate: 79 Resp: 11 SpO2: (!) 78 % O2 Device: None (Room air) Oxygen Delivery: 8 LPM  Vitals:    08/10/18 1610 08/10/18 1745 08/11/18 0316   Weight: 64 kg (141 lb 1.5 oz) 61.9 kg (136 lb 7.4 oz) 63.1 kg (139 lb 1.8 oz)     Vital Signs with Ranges  Temp:  [99  F (37.2  C)-100.8  F (38.2  C)] 100  F (37.8  C)  Heart Rate:  [65-80] 79  Resp:  [11-29] 11  BP: ()/(43-67) 104/52  FiO2 (%):  [40 %] 40 %  SpO2:  [73 %-98 %] 78 %  I/O last 3 completed shifts:  In: 1094.38 [P.O.:140; I.V.:954.38]  Out: 579 [Urine:279; Emesis/NG output:300]    HEART:  S1, S2.  He does have a systolic murmur.   LUNGS:  Clear to auscultation.  There was some upper airway noise.  It was difficult for me to auscultate his lungs in the supine position.   ABDOMEN:  Soft, normal bowel sounds, nontender, nondistended.   EXTREMITIES:  Right and left lower extremity had trace pedal edema.   NEUROLOGIC:  He is sedated and intubated.   PSYCHIATRIC:  Unable to obtain.     Medications     sodium  chloride Stopped (08/11/18 3701)       - MEDICATION INSTRUCTIONS for Dialysis Patients -   Does not apply See Admin Instructions     glycopyrrolate  0.2 mg Intravenous Once       Data     Recent Labs  Lab 08/11/18  1700 08/11/18  1006 08/11/18  0900 08/11/18  0500 08/11/18  0300 08/10/18  1855 08/10/18  1539   WBC  --   --   --   --  15.1* 26.1* 16.8*   HGB 8.3* 8.9*  --  8.6* 8.8* 7.2* 6.1*   MCV  --   --   --   --  96 103* 104*   PLT  --   --   --   --  232 380 314   NA  --   --   --   --  138 138 138   POTASSIUM  --   --   --   --  4.2 3.7 4.2   CHLORIDE  --   --   --   --  99 100 100   CO2  --   --   --   --  28 27 20   BUN  --   --   --   --  42* 53* 104*   CR  --   --   --   --  3.70* 3.28* 6.88*   ANIONGAP  --   --   --   --  11 11 18*   TAMMY  --   --   --   --  7.6* 8.3* 8.0*   GLC  --   --   --   --  144* 161* 230*   ALBUMIN  --   --   --   --   --   --  3.0*   PROTTOTAL  --   --   --   --   --   --  5.9*   BILITOTAL  --   --   --   --   --   --  0.4   ALKPHOS  --   --   --   --   --   --  58   ALT  --   --   --   --   --   --  16   AST  --   --   --   --   --   --  41   LIPASE  --   --   --   --   --  301  --    TROPI 27.921*  --  32.861*  --  28.173* 10.963* 5.992*       Imaging:   No results found for this or any previous visit (from the past 24 hour(s)).[AK1.1]     Revision History        User Key Date/Time User Provider Type Action    > AK1.2 8/12/2018 12:59 PM Rex Haley MD Physician Addend     AK1.1 8/12/2018 10:02 AM Rex Haley MD Physician Sign            Progress Notes by Veronika Hyatt RN at 8/12/2018 11:46 AM     Author:  Veronika Hyatt RN Service:  ICU Author Type:  Registered Nurse    Filed:  8/12/2018 11:49 AM Date of Service:  8/12/2018 11:46 AM Creation Time:  8/12/2018 11:46 AM    Status:  Signed :  Veronika Hyatt RN (Registered Nurse)         Pt transferred out of the ICU to station 88; comfort measures. Family at patient's side. No belongings.[AT1.1]     Revision History         User Key Date/Time User Provider Type Action    > AT1.1 8/12/2018 11:49 AM Veronika Hyatt RN Registered Nurse Sign            Progress Notes by SUSAN Rodas MD at 8/12/2018  8:51 AM     Author:  SUSAN Rodas MD Service:  Nephrology Author Type:  Physician    Filed:  8/12/2018  8:55 AM Date of Service:  8/12/2018  8:51 AM Creation Time:  8/12/2018  8:51 AM    Status:  Signed :  SUSAN Rodas MD (Physician)         Renal Medicine       Decisions of past 24 hours reviewed    Patient reiterated desire to pursue comfort measures  No further dialysis planned[GO1.1]          Recent Labs  Lab 08/11/18  0300      POTASSIUM 4.2   CHLORIDE 99   CO2 28   ANIONGAP 11   *   BUN 42*   CR 3.70*   GFRESTIMATED 16*   GFRESTBLACK 19*   TAMMY 7.6*[GO1.2]           HAIM Rodas    [facility]  [phone][GO1.1]         Revision History        User Key Date/Time User Provider Type Action    > GO1.2 8/12/2018  8:55 AM SUSAN Rodas MD Physician Sign     GO1.1 8/12/2018  8:51 AM SUSAN Rodas MD Physician             Progress Notes by Tom Hill MD at 8/12/2018  8:31 AM     Author:  Tom Hill MD Service:  ICU Author Type:  Physician    Filed:  8/12/2018  8:33 AM Date of Service:  8/12/2018  8:31 AM Creation Time:  8/12/2018  8:31 AM    Status:  Signed :  Tom Hill MD (Physician)         Brief ICU Note    Saw Mr. Croft this morning with his son-in-law at the bedside.   - He is sitting in the chair comfortably and on room air. He is alert, oriented x3.   - He has no current complaints. Pain is controlled.   - His wishes remain as is; no escalation of care and no interventions.   - Plan is to transfer to hospitalist today.     Tom Hill MD   of Medicine  Interventional Pulmonary  Department of Pulmonary, Allergy, Critical Care and Sleep Medicine   [facility] [employer]  Pager: [phone]   Office:  [phone]  Email: [email or web address][RC1.1]       Revision History        User Key Date/Time User Provider Type Action    > RC1.1 8/12/2018  8:33 AM Tom Hill MD Physician Sign            Progress Notes by Mando Sharp MD at 8/11/2018 11:08 PM     Author:  Mando Sharp MD Service:  ICU Author Type:  Physician    Filed:  8/11/2018 11:10 PM Date of Service:  8/11/2018 11:08 PM Creation Time:  8/11/2018 11:08 PM    Status:  Signed :  Mando Sharp MD (Physician)         I was asked by the bedside nurse 2 come to the room and speak with the family about the goals of care now that the patient has been extubated at his request.  I spoke with multiple family members, the patient's wife, and the patient about his wishes.  He was clear that he wants no further medical interventions and is prepared to die.  Accordingly, I had his vasopressin turned off, and will ensure that he has ample medications available if he becomes at all uncomfortable.    Mando Sharp MD  [facility] Intensivist Service[WB1.1]       Revision History        User Key Date/Time User Provider Type Action    > WB1.1 8/11/2018 11:10 PM Mando Sharp MD Physician Sign            Progress Notes by Natalia Garay RT at 8/11/2018 10:42 PM     Author:  Natalia Garay RT Service:  (none) Author Type:  Respiratory Therapist    Filed:  8/11/2018 10:44 PM Date of Service:  8/11/2018 10:42 PM Creation Time:  8/11/2018 10:42 PM    Status:  Signed :  Natalia Garay RT (Respiratory Therapist)         Patient was extubated to be a comfort care at 2230 to aerosol 40% 8 LPM[MF1.1]      Revision History        User Key Date/Time User Provider Type Action    > MF1.1 8/11/2018 10:44 PM Natalia Garay RT Respiratory Therapist Sign            Progress Notes by Ochoa Moncada at 8/11/2018  1:40 PM     Author:  Ochoa Moncada Service:  Spiritual Health Author Type:      Filed:  8/11/2018   1:41 PM Date of Service:  8/11/2018  1:40 PM Creation Time:  8/11/2018  1:40 PM    Status:  Signed :  Ochoa Moncada ()         [facility] Progress Note  [facility] ICU    SH was paged for  request for EOL. SH contacted Fr. Peres, who met with family and anointed pt.     Ochoa Moncada M.Div.  Chaplain Resident  [phone] Pager[LW1.1]     Revision History        User Key Date/Time User Provider Type Action    > LW1.1 8/11/2018  1:41 PM Ochoa Moncada Sign            Progress Notes by Tom Hill MD at 8/11/2018 11:47 AM     Author:  Tom Hill MD Service:  ICU Author Type:  Physician    Filed:  8/11/2018 11:53 AM Date of Service:  8/11/2018 11:47 AM Creation Time:  8/11/2018 11:47 AM    Status:  Signed :  Tom iHll MD (Physician)         [facility]   CRITICAL CARE NOTE     Bean Croft MRN: [id number]  1940  Date of Admission:8/10/2018          Problem List:   1. Acute respiratory failure   2. Acute blood loss presumably from GI source   3. Elevated troponin   4. ESRD on HD      24-Hour Goals   1. Goals of care discussion   2. Supportive care       Overnight Events   No acute issues overnight. Elevated troponins.        Lines/Tubes/Draines/Devices   CVC: RIJ    ETT: yes  House: yes      ICU Prophylaxis:   1. DVT: mechanical  2. VAP: HOB 30 degrees, chlorhexidine rinse  3. Stress Ulcer: PPI/H2 blocker  4. Restraints: Nonviolent soft two point restraints required and necessary for patient safety and continued cares and good effect as patient continues to pull at necessary lines, tubes despite education and distraction. Will readdress daily.   5. IV Access - central access required and necessary for continued patient cares  6. Feeding - npo      Medications:       - MEDICATION INSTRUCTIONS for Dialysis Patients -   Does not apply See Admin Instructions     ceFEPIme (MAXIPIME) IV  1 g Intravenous Q24H     chlorhexidine  15  mL Mouth/Throat BID     pantoprazole (PROTONIX) IV  40 mg Intravenous Daily     vancomycin place tadeo - receiving intermittent dosing  1 each Does not apply See Admin Instructions     bisacodyl, fentaNYL, naloxone      Review of Systems:   Unable to obtain due to critical illness          Physical Exam:   Temp:  [94.1  F (34.5  C)-99.3  F (37.4  C)] 99.3  F (37.4  C)  Pulse:  [93-97] 97  Heart Rate:  [64-98] 75  Resp:  [13-48] 21  BP: ()/(46-67) 109/59  FiO2 (%):  [40 %-100 %] 40 %  SpO2:  [90 %-100 %] 97 %    Intake/Output Summary (Last 24 hours) at 08/11/18 1147  Last data filed at 08/11/18 1000   Gross per 24 hour   Intake          1594.13 ml   Output              390 ml   Net          1204.13 ml     Wt Readings from Last 4 Encounters:   08/11/18 63.1 kg (139 lb 1.8 oz)   07/26/18 63.5 kg (140 lb)   07/09/18 63.3 kg (139 lb 9.6 oz)   06/11/18 62.6 kg (138 lb 1.6 oz)     BP - Mean:  [58-88] 81  Ventilation Mode: CMV/AC  (Continuous Mandatory Ventilation/ Assist Control)  FiO2 (%): 40 %  Rate Set (breaths/minute): 14 breaths/min  Tidal Volume Set (mL): 500 mL  PEEP (cm H2O): 10 cmH2O  Oxygen Concentration (%): 50 %  Resp: 21    Recent Labs  Lab 08/11/18  0500   PH 7.45   PCO2 41   PO2 118*   HCO3 28       GEN: intubated, sedated.    HEENT: head ncat, sclera anicteric, OP patent, trachea midline   PULM: unlabored synchronous with vent, clear anteriorly    CV/COR: RRR S1S2 no gallop,  No rub, no murmur  ABD: soft nontender, hypoactive bowel sounds, no mass  EXT:  Edema   warm  NEURO: grossly intact  SKIN: no obvious rash      Assessment and plan :     Neurology/Psychiatry/Pain/Sedation:   1. Sedation for vent synchrony. Keep RASS -2 to -4 using propofol and prn fentanyl     Cardiovascular/Hemodynamics/Pulmonary/Ventilator Management/Renal/Fluids/Electrolytes/ID/Endo/Heme-Onc  1. Acute respiratory failure presumed 2/2 volume overload with acute vs chronic anemia in setting of CHF and ESRD on HD. Remains  hemodynamically stable. Unable to tolerate fluid removal by HD. Plan to proceed with GI bleeding w/u in addition to possible coronary w/u; however, family has requested a conservative plan. Apparently, he had living will that demonstrate that he would not want any heroic measures with regard to mechanical ventilation and CPR. His wife and daughter, told me that they were unsure if they should have called the ambulance yesterday given his wishes.   2. ESRD. HD per renal  3. ICU glucose protocol  4. Mechanical DVT ppx.  5. Empiric broad spectrum abx pending cultures     GI/Nutrition:   1. NPO. PPI for ppx     Disposition/Code Status/Other  1. Per wife and eldest daughter, the patient's preference would be DNR and no escalation of care. They have requested to keep him supported until his other 2 daughter's arrive. Ultimate plan would be terminal extubation to comfort care. Discussion was had with wife, daughter, RN, cardiology and GI who are in agreement.   2. Code: DNR     ICU Prophylaxis:   1. DVT: mechanical  2. VAP: HOB 30 degrees, chlorhexidine rinse  3. Stress Ulcer: PPI/H2 blocker  4. Restraints: Nonviolent soft two point restraints required and necessary for patient safety and continued cares and good effect as patient continues to pull at necessary lines, tubes despite education and distraction. Will readdress daily.   5. IV Access - central access required and necessary for continued patient cares  6. Feeding - npo     I have personally reviewed the daily labs, imaging studies, cultures and discussed the case with referring physician and consulting physicians.      This patient is critically ill and I have provided 30 minutes of critical care time (excluding procedures) on August 10, 2018.      Tom Hill MD   Critical Care Staff  [phone]      Data:   All data and imaging reviewed     ROUTINE ICU LABS (Last four results)  CMP  Recent Labs  Lab 08/11/18  0300 08/10/18  1855 08/10/18  1539    138 138    POTASSIUM 4.2 3.7 4.2   CHLORIDE 99 100 100   CO2 28 27 20   ANIONGAP 11 11 18*   * 161* 230*   BUN 42* 53* 104*   CR 3.70* 3.28* 6.88*   GFRESTIMATED 16* 18* 8*   GFRESTBLACK 19* 22* 9*   TAMMY 7.6* 8.3* 8.0*   MAG 1.7  --   --    PHOS 4.5  --   --    PROTTOTAL  --   --  5.9*   ALBUMIN  --   --  3.0*   BILITOTAL  --   --  0.4   ALKPHOS  --   --  58   AST  --   --  41   ALT  --   --  16     CBC  Recent Labs  Lab 08/11/18  1006 08/11/18  0500 08/11/18  0300 08/10/18  1855 08/10/18  1539   WBC  --   --  15.1* 26.1* 16.8*   RBC  --   --  2.78* 2.16* 1.82*   HGB 8.9* 8.6* 8.8* 7.2* 6.1*   HCT  --   --  26.8* 22.2* 19.0*   MCV  --   --  96 103* 104*   MCH  --   --  31.7 33.3* 33.5*   MCHC  --   --  32.8 32.4 32.1   RDW  --   --  18.6* 14.8 15.1*   PLT  --   --  232 380 314     INRNo lab results found in last 7 days.  Arterial Blood Gas  Recent Labs  Lab 08/11/18  0500   PH 7.45   PCO2 41   PO2 118*   HCO3 28       All cultures:    Recent Labs  Lab 08/10/18  1959 08/10/18  1958 08/10/18  1633 08/10/18  1605 08/10/18  1553   CULT Canceled, Test creditedDuplicate request Canceled, Test creditedDuplicate request No growth after 7 hours No growth after 7 hours PENDING     Recent Results (from the past 24 hour(s))   XR Chest Port 1 View    Narrative    XR CHEST PORT 1 VW 8/10/2018 3:48 PM    COMPARISON: 10/27/2016    HISTORY: Respiratory distress, endotracheal tube positioning.      Impression    IMPRESSION: Endotracheal tube tip approximately 4 cm above the amina.  Patchy opacities over both lungs, RIGHT worse than LEFT, edema versus  atypical infection. No pneumothorax seen on either side.    REY PRUITT MD   XR Chest Port 1 View    Narrative    CHEST PORTABLE ONE VIEW 8/10/2018 8:07 PM     HISTORY: Check central line.    COMPARISON: Chest x-ray 8/10/2018.      Impression    IMPRESSION:   1. New right neck central line tip at the superior vena cava.  Nasogastric tube is identified. The ET tube tip is difficult  to  visualize.  2. Bilateral vascular and interstitial prominence again noted. This  may be mildly improved on the right but is unchanged on the left.    SOBIA RICHARDSON MD   CT Chest Abdomen Pelvis w/o Contrast    Narrative    CT CHEST/ABDOMEN/PELVIS WITHOUT CONTRAST  8/10/2018 10:02 PM    HISTORY: Occult blood positive stool. Patient with congestive heart  failure, ESRD with questionable sepsis and acute respiratory failure.    TECHNIQUE: CT scan obtained of the chest, abdomen, and pelvis without  IV contrast. Radiation dose for this scan was reduced using automated  exposure control, adjustment of the mA and/or kV according to patient  size, or iterative reconstruction technique.    COMPARISON:  CT abdomen and pelvis 7/23/2016.    FINDINGS:  Chest: The patient is intubated. Nasogastric tube in place. Thoracic  aortic and coronary artery calcifications. Small bilateral pleural  effusions. Bibasilar atelectasis. There are patchy areas of  consolidation suggested at the bilateral lower lobes and posterior  left upper lobe adjacent to the major fissure. Mild emphysematous  changes. Some respiratory motion limits detail assessment of the  lungs. There are a few scattered mildly prominent lymph nodes in the  mediastinum. One of the larger lymph nodes is 2.0 x 1.2 cm distal  right paratracheal location series 2 image 28. There are other  examples.    Abdomen/pelvis: Diffuse vascular calcifications. House catheter  decompresses the bladder. Small gas in the bladder. Prostate is  prominent measuring 4.7 cm. No bowel obstruction. Colonic  diverticulosis. No convincing diverticulitis or other bowel  inflammation. Normal appendix.    Within the limits of unenhanced scanning, the liver, gallbladder,  spleen, and pancreas shows no significance. Nodular thickening is  hypodense involving the left adrenal with internal density measurement  of 1.8 Hounsfield units. This nodule is 1.7 cm series 2 image 63 and  is consistent with an  adenoma. It appears stable. Stable minimal  thickening of the right adrenal. Bilateral renal cysts again noted.  One of the largest is at the upper right kidney measuring 8.6 cm,  stable image 59. There are other cyst examples in the kidneys. No  enlarged lymph nodes in the abdomen or pelvis. Bone windows of the  chest, abdomen, and pelvis do not show any convincing destructive bony  lesion.      Impression    IMPRESSION:  1. Bilateral patchy areas of consolidation at the lung bases and  posterior left upper lobe. Correlate with evidence of multifocal  pneumonia. Lung base opacities may also represent atelectasis.  2. Small bibasilar pleural effusions.  3. A few nonspecific mildly prominent mediastinal lymph nodes.  4. Colonic diverticulosis without diverticulitis. No acute  inflammatory change of the bowel. Please note that this examination  cannot exclude any underlying colonic lesions.  5. Enlarged prostate.    SOBIA RICHARDSON MD[RC1.1]                            Revision History        User Key Date/Time User Provider Type Action    > RC1.1 8/11/2018 11:53 AM Tom Hill MD Physician Sign            Progress Notes by SUSAN Rodas MD at 8/11/2018  8:49 AM     Author:  SUSAN Rodas MD Service:  Nephrology Author Type:  Physician    Filed:  8/11/2018  8:56 AM Date of Service:  8/11/2018  8:49 AM Creation Time:  8/11/2018  8:49 AM    Status:  Signed :  SUSAN Rodas MD (Physician)         Renal Medicine Progress Note                                Bean Croft MRN# [id number]   Age: 78 year old YOB: 1940   Date of Admission: 8/10/2018 Hospital LOS: 1                  Assessment/Plan:     Presented via EMS with respiratory distress.  Intubated in route.  Evaluated for  apparent volume overload in setting of ESRD     1.  ESRD         -etiology presumed diabetic            -TS schedule [facility]            -target weight 63 kg              -off weight 08/07/18    61.4 kg             -AVF  2.  Anemia              -LA NENA plus iv Fe              -last Hgb week of 07/28   8.6 g/dl  3.  Mineral Bone Disease             - range              -intermittent elevated PO4  4.  Respiratory failure              -intubated             -volume v infectious process  5.  Hypotension             -typical dialysis run BPs 130 to 150 systolic   -currently on vasopressin      Dialyzed yesterday  No UF secondary to hypotension    No dialysis today  Evaluate in am      Interval History:     Intubated and sedated.  On vasopressin.  Escalating trop.        ROS:     Intubated and sedated: ROS unable    Medications and Allergies:     Reviewed    Physical Exam:     Vitals were reviewed[GO1.1]  Patient Vitals for the past 8 hrs:   BP Temp Temp src Heart Rate Resp SpO2 Weight   08/11/18 0800 - - Esophageal - - - -   08/11/18 0600 91/55 98.8  F (37.1  C) - 76 16 99 % -   08/11/18 0515 99/57 99  F (37.2  C) - 79 15 100 % -   08/11/18 0500 98/58 - - 77 16 100 % -   08/11/18 0445 98/57 99  F (37.2  C) - 79 17 100 % -   08/11/18 0430 92/55 98.8  F (37.1  C) - 75 15 - -   08/11/18 0415 (!) 88/56 99  F (37.2  C) - 75 15 - -   08/11/18 0400 (!) 86/51 99  F (37.2  C) - 73 14 100 % -   08/11/18 0345 (!) 77/46 99.1  F (37.3  C) - 73 15 - -   08/11/18 0330 (!) 76/46 99.1  F (37.3  C) - 71 14 - -   08/11/18 0316 - - - - - 100 % 63.1 kg (139 lb 1.8 oz)   08/11/18 0315 (!) 71/52 - - 75 (!) 48 - -   08/11/18 0300 96/57 99.1  F (37.3  C) - 78 17 - -   08/11/18 0245 102/61 99.1  F (37.3  C) - 80 19 - -   08/11/18 0230 108/57 99  F (37.2  C) - - - - -   08/11/18 0215 102/59 99  F (37.2  C) - 78 18 - -   08/11/18 0200 92/56 99  F (37.2  C) - 77 17 - -   08/11/18 0145 93/58 99  F (37.2  C) - 77 17 - -   08/11/18 0130 106/63 98.8  F (37.1  C) - 84 20 - -   08/11/18 0115 95/59 98.8  F (37.1  C) - 77 17 - -   08/11/18 0100 93/57 98.6  F (37  C) - 76 17 - -     I/O last 3 completed shifts:  In: 1144.96 [I.V.:144.96]  Out:  243 [Urine:243][GO1.2]    Vitals:    08/10/18 1610 08/10/18 1745 08/11/18 0316   Weight: 64 kg (141 lb 1.5 oz) 61.9 kg (136 lb 7.4 oz) 63.1 kg (139 lb 1.8 oz)     GENERAL:  intubated and sedated  HEENT: ETT  RESP:  clear anteriorly  CV: RRR, normal S1 S2  ABDOMEN: soft, nontender, no HSM or masses and bowel sounds normal  MS: no clubbing, cyanosis   SKIN: clear without significant rashes or lesions  EXT: warm    Data:       Recent Labs  Lab 08/11/18  0300 08/10/18  1855 08/10/18  1539    138 138   POTASSIUM 4.2 3.7 4.2   CHLORIDE 99 100 100   CO2 28 27 20   ANIONGAP 11 11 18*   * 161* 230*   BUN 42* 53* 104*   CR 3.70* 3.28* 6.88*   GFRESTIMATED 16* 18* 8*   GFRESTBLACK 19* 22* 9*   TAMMY 7.6* 8.3* 8.0*[GO1.1]           Recent Labs  Lab 08/10/18  1539   ALBUMIN 3.0*       Recent Labs  Lab 08/11/18  0500 08/11/18  0300 08/10/18  1855 08/10/18  1539   PHOS  --  4.5  --   --    HGB 8.6* 8.8* 7.2* 6.1*[GO1.3]         SUSAN Rodas    [facility] - Nephrology  [phone][GO1.1]     Revision History        User Key Date/Time User Provider Type Action    > GO1.3 8/11/2018  8:56 AM SUSAN Rodas MD Physician Sign     GO1.2 8/11/2018  8:54 AM SUSAN Rodas MD Physician      GO1.1 8/11/2018  8:49 AM SUSAN Rodas MD Physician             Progress Notes by Bandar Gibbs MD at 8/11/2018  6:21 AM     Author:  Bandar Gibbs MD Service:  General Medicine Author Type:  Physician    Filed:  8/11/2018  7:18 AM Date of Service:  8/11/2018  6:21 AM Creation Time:  8/11/2018  6:21 AM    Status:  Signed :  Bandar Gibbs MD (Physician)         Critical Care Progress Note     Mr. Moncada[PN1.1] has[JH1.1] had[PN1.1] escalating[JH1.1] troponin elevation[PN1.1]s since admission.[JH1.1]  His initial troponin at 1540 on 8/10[PN1.1] was elevated to 5.9[PN1.2] and was trending up to 10.9 at 1900 on 8/10. This morning at 0300 it increased to 28.1.[PN1.1]  A cardiac[JH1.1] ECHO was  completed,[PN1.1] demonstrating[JH1.1] apical hypokinesis. Arterial blood gas[PN1.1] was consistent with[JH1.1] metabolic alkalosis at[PN1.2] 7.45/41/118/28.[PN1.1] H[JH1.1]is initial Hgb was 6.1[PN1.2] gm/dL.  Hgb[JH1.1] recheck[PN1.2] following trasnfusion was[JH1.1] 8.8[PN1.2] gm/dL[JH1.1] at 0300 and[PN1.2] Hgb was[JH1.1] 8.6[PN1.2] gm/dL[JH1.1] at 0500.[PN1.2]    A/P:[PN1.1]     1.) GI bleed Hgb now stable; will consult GI Medicine.    2[JH1.1])[PN1.1] Apical hypokinesis[PN1.2] and possible stress cardiomyopathy (Takasubu) vs. ACS.[JH1.1]  Cardiology[PN1.1] consultation was obtained and vasopressin drip recommended[JH1.1] to[PN1.1] increase outflow[JH1.1] resistance and[PN1.1] potentially decrease left ventricular mechanical outflow tract obstruction.     Bandar Gibbs M.D., Ph.D.[JH1.1]           Revision History        User Key Date/Time User Provider Type Action    > JH1.1 8/11/2018  7:18 AM Bandar Gibbs MD Physician Sign     PN1.2 8/11/2018  6:51 AM Lynda Fajardo Medical Student Share     PN1.1 8/11/2018  6:21 AM Lynda Fajardo Medical Student             Progress Notes by Candace Beebe RN at 8/11/2018  5:15 AM     Author:  Candace Beebe RN Service:  Nursing Author Type:  Registered Nurse    Filed:  8/11/2018  5:16 AM Date of Service:  8/11/2018  5:15 AM Creation Time:  8/11/2018  5:15 AM    Status:  Signed :  Candace Beebe RN (Registered Nurse)         Echo being performed bedside[AA1.1]      Revision History        User Key Date/Time User Provider Type Action    > AA1.1 8/11/2018  5:16 AM Candace Beebe RN Registered Nurse Sign            Progress Notes by Candace Beebe RN at 8/11/2018  3:30 AM     Author:  Candace Beebe RN Service:  Nursing Author Type:  Registered Nurse    Filed:  8/11/2018  5:15 AM Date of Service:  8/11/2018  3:30 AM Creation Time:  8/11/2018  5:14 AM    Status:  Signed :  Candace Beebe RN (Registered Nurse)         Sai SCHWAB notified  of hgb 8.8 and trop 28.1. Echo, ekg, cardiology consult ordered stat.[AA1.1]     Revision History        User Key Date/Time User Provider Type Action    > AA1.1 8/11/2018  5:15 AM Candace Beebe, RN Registered Nurse Sign            Progress Notes by Rene Conn RT at 8/11/2018  4:55 AM     Author:  Rene Conn RT Service:  Respiratory Therapy Author Type:  Respiratory Therapist    Filed:  8/11/2018  4:56 AM Date of Service:  8/11/2018  4:55 AM Creation Time:  8/11/2018  4:55 AM    Status:  Signed :  Rene Conn RT (Respiratory Therapist)         [facility] ICU RESPIRATORY NOTE  Date of Admission: 8/10/18  Date of Intubation (most recent): 8/10/18  Reason for Mechanical Ventilation: Resp. Failure/Unresponsive  Number of Days on Mechanical Ventilation: 2  Met Criteria for Pressure Support Trial: No  Length of Pressure Support Trial:  Reason for Stopping Pressure Support Trial:  Reason for No Pressure Support Trial: Per MD    Significant Events Today: None overnight    ABG Results: No lab results found in last 7 days.    Ventilation Mode: CMV/AC  (Continuous Mandatory Ventilation/ Assist Control)  FiO2 (%): 40 %  Rate Set (breaths/minute): 14 breaths/min  Tidal Volume Set (mL): 500 mL  PEEP (cm H2O): 10 cmH2O  Oxygen Concentration (%): 60 %  Resp: 17      ETT appearance on chest x-ray: In good position     Plan:  Continue full support[TK1.1]    Rene Conn[TK1.2] RT[TK1.1]        Revision History        User Key Date/Time User Provider Type Action    > TK1.2 8/11/2018  4:56 AM Rene Conn RT Respiratory Therapist Sign     TK1.1 8/11/2018  4:55 AM Rene Conn RT Respiratory Therapist             ED Provider Notes by Tom Barone DO at 8/10/2018  3:32 PM     Author:  Tom Barone DO Service:  Emergency Medicine Author Type:  Physician    Filed:  8/11/2018 12:05 AM Date of Service:  8/10/2018  3:32 PM Creation Time:  8/10/2018  3:38 PM    Status:  Signed :   Tom Barone DO (Physician)           History     Chief Complaint:  Respiratory Distress    HPI     The patient presents unresponsive, so history is provided by EMS.     Bean Croft is a 78 year old male with a history of CHF, on dialysis (last Tuesday, performed twice per week, on Lasix), who presents to the emergency department for evaluation of respiratory distress. Per EMS, the patient recently had his diuretics decreased[KL1.1] 2 days[CO1.1] ago by PCP and has since been experiencing fatigue and weakness. EMS was contacted by family for the patient's respiratory distress while on the toilet; EMS states he was found pale and with loose stools, has had loose stools for the past week. EMS indicates the patient was unable to stay conscious during transport and had audible wheezes with breathing.[KL1.1] He was moving all 4 limbs freely at this point.[KL1.2] They intubated at around 1500 and later around 1520 noted they lost left-sided breath sounds, so they ely[KL1.1] the ET tube[CO1.1] back; he was given etomidate and succinate[KL1.1] prior to intubation[CO1.1]. Patient was given dopamine via EMS without improvement of blood pressure[KL1.1] as he was noted to have systolic pressures in the 70s and 80s[CO1.1]. Patient was also given 2 sublingual Nitrostat, 7.5 mg morphine, and 7.5 mg Versed, last Versed given around 1527.[KL1.1]  IO was placed by EMS in[CO1.1] left proximal tibia.    The patient's wife presents to the ED and provides further history.    She indicates the patient is on Lasix[KL1.1], continues to make urine,[CO1.1] and sees Dr. Zamudio[KL1.1] of[CO1.1] nephrology.[KL1.1]  Denies history of chest pain or known GI bleed.  Patient takes aspirin daily.[CO1.1]    Allergies:[KL1.1]  Ace Inhibitors  Penicillins[KL1.3]     Medications:    Zyloprim  Norvasc  Aspirin  Colace  Aricept  Lasix  Apresoline  Toprol  Dialyvite  Prilosec  Zocor     Past Medical History:     Anemia  Arthritis  Carotid artery stenosis  CHF  CKD  CAD  Dementia  DM  Diabetic retinopathy  Diverticulitis  Enlarged prostate  GERD  Gout  Hearing loss  History of left-sided carotid endarterectomy  HLD  HTN  Low back pain  Peripheral vascular disease  Pulmonary nodule  Seborrheic keratosis  Sleep apnea    Past Surgical History:    Coronary angiography  Create fistula arteriovenous upper extremity  Endarterectomy carotid  Repair fistula arteriovenous upper extremity  T&A    Family History:    MI  CAD    Social History:  Presents via EMS.  Former smoker, quit 1/25/2016.  Negative for alcohol use.  Marital Status:   [2]     Review of Systems   Unable to perform ROS: Patient unresponsive     Physical Exam[KL1.1]     Patient Vitals for the past 24 hrs:   BP Temp Temp src Pulse Heart Rate Resp SpO2 Weight   08/10/18 1720 116/64 - - - 94 - 98 % -   08/10/18 1715 116/61 - - - 94 - 99 % -   08/10/18 1710 106/59 - - - 92 18 - -   08/10/18 1705 104/59 - - - 92 19 97 % -   08/10/18 1700 101/56 - - - 91 18 97 % -   08/10/18 1655 96/57 - - - 92 18 96 % -   08/10/18 1650 97/57 - - - 89 21 96 % -   08/10/18 1645 - - - - 96 20 95 % -   08/10/18 1640 118/64 - - - 95 17 95 % -   08/10/18 1635 115/64 - - - 93 18 95 % -   08/10/18 1630 119/63 - - - 94 16 95 % -   08/10/18 1625 117/65 - - - 92 18 95 % -   08/10/18 1620 114/67 - - - 90 18 94 % -   08/10/18 1615 109/61 - - - 90 21 - -   08/10/18 1610 108/63 - - - 89 16 - 64 kg (141 lb 1.5 oz)   08/10/18 1605 107/65 - - - 89 18 96 % -   08/10/18 1600 100/63 96.4  F (35.8  C) Temporal - 87 19 96 % -   08/10/18 1555 96/60 - - - 87 20 98 % -   08/10/18 1550 95/60 - - - 89 16 99 % -   08/10/18 1545 95/60 - - - 89 19 99 % -   08/10/18 1540 91/56 - - - 92 13 100 % -   08/10/18 1535 (!) 84/53 - - 93 - 27 90 % -[KL1.4]       Physical Exam[KL1.1]  General: Unresponsive. Intubated. Patient in severe respiratory distress. Shallow respirations.  Head:  Scalp is NC/AT  Eyes:  Pinpoint  pupils bilaterally.  ENT:  The external nose and ears are normal.[KL1.2] ETT in place[CO1.1]  Neck:[KL1.2]  Atraumatic[CO1.1]  CV:  Regular rate and rhythm    Left arm dialysis fistula C/D/I.    Left tibial IO.  Resp:  Diffuse, coarse lung sounds    Endotracheal tube in place.  GI:  Abdomen is soft, no distension, no tenderness. No peritoneal signs  MS:  Lower extremity 3+ pitting edema bilaterally.[KL1.2]   Rectal: Dark brown stool, Hemoccult positive[CO1.1]  Skin:  Warm and dry, No rash or lesions noted.  Neuro: Intubated and sedated. Reportedly moving all 4 extremities prior to intubation.[KL1.2]    Emergency Department Course   ECG:  Time:[KL1.1] 1554[KL1.5]  Vent. Rate[KL1.1] 88[KL1.5] bpm. WA interval[KL1.1] 194[KL1.5]. QRS duration[KL1.1] 100[KL1.5]. QT/QTc[KL1.1] 398/481[KL1.5]. P-R-T axis[KL1.1] 82 73 221[KL1.5].[KL1.1] Sinus rhythm with occasional premature ventricular complexes. Cannot rule out Anterior infarct, age undetermined. Abnormal ECG.[KL1.5] Read time:[KL1.1] 1604[KL1.5]    Imaging:[KL1.1]  Radiographic findings were communicated with the family who voiced understanding of the findings.    XR Chest Port 1 View:  Endotracheal tube tip approximately 4 cm above the amina.  Patchy opacities over both lungs, RIGHT worse than LEFT, edema versus  atypical infection. No pneumothorax seen on either side. As per radiology.[KL1.6]    Laboratory:[KL1.1]  Occult blood stool:[KL1.6] Positive[KL1.7]    UA with micro: , Albumin 30, Amorphous Crystals Few, o/w negative    CBC: WBC: 16.8 (H), HGB: 6.1 (LL), PLT: 314  CMP: Glucose 230 (H), Anion Gap 18 (H),  (H), Creatinine 6.88 (H), GFR Estimate 8 (L), Calcium 8.0 (L), Albumin 3.0 (L), Protein Total 5.9 (L), o/w WNL     BNP: 07616 (H)    1539 Troponin: 5.992 (HH)[KL1.6]    Lactic acid: 1.8[KL1.4]    Blood culture x2 pending.  Urine culture pending.[KL1.6]    Procedures:      Rapid Sequence Intubation Procedure note         Indication:[KL1.1]   Respirator Failure[KL1.4]      The patient was premedicated with[KL1.1] Etomidate and Succinycholine prior to ED presentation[KL1.4] and     intubated by[KL1.1] Dr. Barone[KL1.4] with a[KL1.1]     #7.0 cuffed[KL1.4], ET Tube and a[KL1.1]     Glidescope blade[KL1.4].  Following intubation the patient's breath     sounds were[KL1.1] equal[KL1.4].  ET Tube placement    was  confirmed with[KL1.1] auscultation, ETCO2 monitor and CXR[KL1.4].     MONITORING:   Monitoring consisted of : heart rate, cardiac monitor, continuous pulse oximetry, blood pressure checks, level of consciousness, IV access, constant attendance by RN, and MD attendance until patient stable or transfer of care    PATIENT STATUS:   Complications from this procedure were[KL1.1] none[KL1.4]. Oxygen saturations following intubation were[KL1.1] >90[KL1.4]%.    Interventions:  1556 Lasix 80 mg IV given[KL1.1]  1626 Diprivan 5 mcg/kg/min x 63.5 kg IV  1631 Maxipime 2 g I[KL1.8]  1649 Diprivan 10 mcg/[KL1.4]kg/min x 63.5 kg IV[KL1.8] DOSE CHANGE[KL1.4]  1650 Diprivan 20 mg IV[KL1.9]  1706 Vancocin 1500 mg IV  1716 Diprivan 15 mcg/[KL1.4]kg/min x 63.5 kg IV[KL1.8] DOSE CHANGE[KL1.4]    Emergency Department Course:  1531 The patient presents to the ED. Patient presents intubated and with IO infusion in place.  1536 I performed my exam on this patient.   I checked the intubation. Patient's intubation removed and patient re-intubated.  1541 The patient's wife presents to the ED outside the room. I spoke with the wife regarding the patient's condition and history.  1542 Portable Chest X-ray performed.  1545 Patient taken off dopamine.  1550 Catheter placed.  1554 EKG obtained in the ED, see results above.   1556 Lasix 80 mg IV given.  1602 I spoke with Dr. Rene Rodas, nephrology, regarding this patient.[KL1.1]  1618 I rechecked this patient.[KL1.10]  1640 I rechecked this patient[KL1.8] and performed a rectal exam.[KL1.9] Dark stool noted. Stool sample  collected.[KL1.7]  1650 I spoke with Dr. Hill, intensivist, who agreed to admit this patient.[KL1.6]  1710 I rechecked the patient and discussed the results of his workup thus far.[KL1.7]    Findings and plan explained to the spouse who consents to admission. Discussed the patient with Dr. Hill, who will admit the patient to an ICU bed for further monitoring, evaluation, and treatment.[KL1.9]    Impression & Plan      Medical Decision Making:[KL1.1]  Patient is a 7[CO1.2]8[KL1.4]-year-old male who presents in acute respiratory distress; intubated by EMS and on dopamine infusion[CO1.2],[KL1.11] secondary to hypotension.  Patient's medical history and records were reviewed.  Initial[CO1.2] consideration[KL1.11] for,[CO1.2] but[KL1.11] not limited to, CHF exacerbation/fluid overload, infectious process, cardiomyopathy, ACS/MI, electrolyte abnormality, metabolic disturbance, among others.  Labs, EKG, and imaging was obtained.  Patient was initially[CO1.2] satting[KL1.4] in the low to mid 80s on initial arrival.  Examination with glide scope showed displacement of ET tube into the patient's vocal cords[CO1.2],[KL1.4] with balloon partially inflated.  The ET tube balloon was completely deflated[CO1.2],[KL1.4] and the patient was reintubated per procedure note above; no additional sedatives or paralytics needed.  Following reintubation the patient's oxygen saturations improved to normal.  Patient was placed on increased PEEP.  IO was placed by EMS prior to ED arrival and additional IV access was established.  Dopamine infusion was discontinued and patient's blood pressures remained steady without need for additional pressors during ED course.  Labs notable for significant anemia (hemoglobin 6.1: History of chronic anemia), elevated creatinine (dialysis dependent), significant elevated troponin (5.992), related BNP (11,748), elevated anion gap (18), and UA without evidence of infection.  EKG demonstrates ST depression in V3  without other significant change from previous.  Patient with no report of chest pain prior to ED arrival.  Rectal exam was performed[CO1.2],[KL1.4] demonstrating dark brown stool which is Hemoccult positive; aspirin/heparin deferred due to concern for possible GI bleed.  Patient was placed on a propofol infusion[CO1.2] for sedation[CO1.1].  Chest x-ray demonstrated good position of ET tube with evidence of pulmonary edema.  As pneumonia cannot be excluded patient was provided cefepime and vancomycin; blood cultures obtained prior to antibiotic administration.  Lactic acid obtained and[CO1.2] is normal (1.8)[KL1.4].  Case discussed with Dr. Patel of nephrology shortly after patient's arrival as patient will require urgent dialysis.  Additionally case was discussed with Dr. Hill of the ICU who accepted patient for admission.  While in ED patient also received 80 mg IV Lasix and House catheter was placed with evidence of some urine production.  At this time[CO1.2],[KL1.4] presentation is most consistent with likely CHF exacerbation though underlying ACS/cardiomyopathy or infectious process not excluded[CO1.2] as well as GI bleed[CO1.1].  Family consented to blood transfusion[CO1.2];[KL1.4] blood was ordered and is pending.[CO1.2]      Critical Care time:  was 40 minutes for this patient excluding procedures.[KL1.4]    Diag[CO1.2]nosis:[KL1.1]    ICD-10-CM    1. Anemia in chronic kidney disease, on chronic dialysis (H) N18.6 Blood culture    D63.1 Urine Culture    Z99.2 Blood culture     Central line     Central line     Blood culture     Blood culture     Blood culture     Blood culture   2. CHF exacerbation (H) I50.9 ABO/Rh type and screen     Occult blood stool (ED Lab POCT Only 3-11)     Lactic acid     Glucose by meter     Glucose by meter     Blood component     Blood component     Lactic acid whole blood     Basic metabolic panel     CBC with platelets     Lipase     Procalcitonin     Troponin I     Blood  component     Blood component     Cortisol     Cortisol     TSH with free T4 reflex     TSH with free T4 reflex     CANCELED: Platelet count     CANCELED: TSH with free T4 reflex (Add on or recollect)     CANCELED: Cortisol   3. Acute respiratory failure with hypoxia (H) J96.01    4. Acute pulmonary edema (H) J81.0    5. Gastrointestinal hemorrhage, unspecified gastrointestinal hemorrhage type K92.2    6. Elevated troponin R74.8[CO1.3]          Disposition:[KL1.1]  Admitted to Dr. Hill.[KL1.9]    I, Grey Cardoza, am serving as a scribe on 8/10/2018 at 3:39 PM to personally document services performed by Tom Barone DO based on my observations and the provider's statements to me.[KL1.12]     Grey Cardoza  8/10/2018    EMERGENCY DEPARTMENT[KL1.1]       Tom Barone DO  08/11/18 0005  [CO1.4]     Revision History        User Key Date/Time User Provider Type Action    > CO1.4 8/11/2018 12:05 AM Tom Barone DO Physician Sign     CO1.3 8/11/2018 12:04 AM Tom Barone DO Physician      CO1.1 8/10/2018 11:47 PM Tom Barone DO Physician      KL1.4 8/10/2018  5:58 PM Grey Cardoza Share     KL1.11 8/10/2018  5:37 PM Grey Cardoza Share     CO1.2 8/10/2018  5:20 PM Tom Barone DO Physician Share     KL1.7 8/10/2018  5:11 PM Grey Cardozaibe Share     KL1.12 8/10/2018  5:09 PM Grey Cardoza Share     [N/A] 8/10/2018  4:57 PM Grey Cardoza Share     KL1.9 8/10/2018  4:57 PM Grey Cardozaibe Share     KL1.6 8/10/2018  4:52 PM Grey Cardozae Share     KL1.8 8/10/2018  4:44 PM Grey Cardoza Share     KL1.10 8/10/2018  4:18 PM Grey Cardoza Share     KL1.5 8/10/2018  4:17 PM Grey Cardoza Share     KL1.2 8/10/2018  4:15 PM Grey Cardoza Share     KL1.3 8/10/2018  4:06 PM Grey Cardoza Share     KL1.1 8/10/2018  3:38 PM Grey Cardoza                      Procedure Notes       Procedures by Anat Sosa MD at 8/10/2018  7:47 PM     Author:  Anat Sosa MD Service:  General Medicine Author Type:  Resident    Filed:  8/10/2018  7:50 PM Date of Service:  8/10/2018  7:47 PM Creation Time:  8/10/2018  7:43 PM    Status:  Attested :  Anat Sosa MD (Resident)    Cosigner:  Tom Hill MD at 8/11/2018 12:08 PM         Procedure Orders:    1. Central line [[id number]] ordered by Anat Sosa MD at 08/10/18 1944            Post-procedure Diagnoses:    1. Anemia in chronic kidney disease, on chronic dialysis (H) [N18.6, D63.1, Z99.2]          Attestation signed by Tom Hill MD at 8/11/2018 12:08 PM        Attestation:  Physician Attestation   I spent a total of 20 minutes with the patient, personally observing the resident as she performed the central line.     Key findings: US placement of central line    Tom Hill  Date of Service (when I saw the patient): 8/10/18                               Procedure/Surgery Information   [facility]    Bedside Procedure Note  Date of Service (when I performed the procedure): 08/10/2018    Bean Croft is a 78 year old male patient.  1. CHF exacerbation (H)      Past Medical History:   Diagnosis Date     Anemia     unspecified     Arthritis     generalized     Carotid artery stenosis      CHF (congestive heart failure) (H)      Chronic kidney disease     Stage IV     Coronary artery disease      Dementia      Diabetes mellitus (H)     Non-insulin Dependent     Diabetic retinopathy (H)      Diverticulitis 7/2016     DM (diabetes mellitus screen)      Enlarged prostate      Gastro-oesophageal reflux disease      Gout      Hearing loss      History of left-sided carotid endarterectomy 4/19/16     Hyperlipidaemia      Hypertension      Low back pain      Peripheral vascular disease (H)      Pulmonary nodule      Seborrheic keratosis      Sleep apnea     stated by patient - not  "official diagnosis     Temp: 96.6  F (35.9  C) Temp src: Temporal BP: (!) 76/51 Pulse: 97 Heart Rate: 96 Resp: 18 SpO2: 100 % O2 Device: Mechanical Ventilator      Central line  Date/Time: 8/10/2018 7:44 PM  Performed by: ANAT SOSA  Authorized by:[SS1.1] DAVY LOBATO[SS1.2]   Consent: The procedure was performed in an emergent situation. Verbal consent not obtained. Written consent not obtained.  Required items: required blood products, implants, devices, and special equipment available  Patient identity confirmed: anonymous protocol, patient vented/unresponsive  Time out: Immediately prior to procedure a \"time out\" was called to verify the correct patient, procedure, equipment, support staff and site/side marked as required.  Indications: vascular access  Anesthesia: local infiltration    Anesthesia:  Local Anesthetic: lidocaine 1% without epinephrine  Anesthetic total: 0.1 mL    Sedation:  Patient sedated: no  Preparation: skin prepped with betadine  Location details: right internal jugular  Patient position: flat  Catheter type: triple lumen  Pre-procedure: landmarks identified  Ultrasound guidance: yes  Number of attempts: 1  Successful placement: yes  Post-procedure: line sutured  Assessment: blood return through all parts  Patient tolerance: Patient tolerated the procedure well with no immediate complications        Anat Sosa[SS1.1]     Revision History        User Key Date/Time User Provider Type Action    > [N/A] 8/10/2018  7:50 PM Anat Sosa MD Resident Sign     SS1.2 8/10/2018  7:49 PM Anat Sosa MD Resident Sign     SS1.1 8/10/2018  7:43 PM Anat Sosa MD Resident                      Progress Notes - Therapies (Notes from 08/11/18 through 08/14/18)      Progress Notes by Natalia Garay RT at 8/11/2018 10:42 PM     Author:  Natalia Garay RT Service:  (none) Author Type:  Respiratory Therapist    Filed:  8/11/2018 10:44 PM Date of Service:  " 8/11/2018 10:42 PM Creation Time:  8/11/2018 10:42 PM    Status:  Signed :  Natalia Garay RT (Respiratory Therapist)         Patient was extubated to be a comfort care at 2230 to aerosol 40% 8 LPM[MF1.1]      Revision History        User Key Date/Time User Provider Type Action    > MF1.1 8/11/2018 10:44 PM Natalia Garay RT Respiratory Therapist Sign            Progress Notes by Rene Conn RT at 8/11/2018  4:55 AM     Author:  Rene Conn RT Service:  Respiratory Therapy Author Type:  Respiratory Therapist    Filed:  8/11/2018  4:56 AM Date of Service:  8/11/2018  4:55 AM Creation Time:  8/11/2018  4:55 AM    Status:  Signed :  Rene Conn RT (Respiratory Therapist)         [facility] ICU RESPIRATORY NOTE  Date of Admission: 8/10/18  Date of Intubation (most recent): 8/10/18  Reason for Mechanical Ventilation: Resp. Failure/Unresponsive  Number of Days on Mechanical Ventilation: 2  Met Criteria for Pressure Support Trial: No  Length of Pressure Support Trial:  Reason for Stopping Pressure Support Trial:  Reason for No Pressure Support Trial: Per MD    Significant Events Today: None overnight    ABG Results: No lab results found in last 7 days.    Ventilation Mode: CMV/AC  (Continuous Mandatory Ventilation/ Assist Control)  FiO2 (%): 40 %  Rate Set (breaths/minute): 14 breaths/min  Tidal Volume Set (mL): 500 mL  PEEP (cm H2O): 10 cmH2O  Oxygen Concentration (%): 60 %  Resp: 17      ETT appearance on chest x-ray: In good position     Plan:  Continue full support[TK1.1]    Rene Conn[TK1.2] RT[TK1.1]        Revision History        User Key Date/Time User Provider Type Action    > TK1.2 8/11/2018  4:56 AM Rene Conn RT Respiratory Therapist Sign     TK1.1 8/11/2018  4:55 AM Rene Conn RT Respiratory Therapist

## 2018-08-10 NOTE — PROGRESS NOTES
Renal Medicine Inpatient Dialysis Note                                Bean Croft MRN# [id number]   Age: 78 year old YOB: 1940   Date of Admission: 8/10/2018 Hospital LOS: 0          Assessment/Plan:     Presented via EMS with respiratory distress.  Intubated in route.  Evaluated for  apparent volume overload in setting of ESRD    1.  ESRD   -etiology presumed diabetic    -[facility]   -target weight 63 kg   -off weight 08/07/18   61.4 kg   -AVF  2.  Anemia   -LA NENA plus iv Fe   -last Hgb week of 07/28   8.6 g/dl  3.  Mineral Bone Disease   - range   -intermittent elevated PO4  4.  Respiratory failure   -intubated   -volume v infectious process  5.  Hypotension   -dopamine weaned off in ED   -typical dialysis run BPs 130 to 150 systolic      Urgent dialysis for UF to address potential volume component  2 liters as tolerated by BP  1 unit PRBC to be given on run      Interval History:     Seen initially in ER.  Evaluated just prior to dialysis in ICU.  Parameters reviewed  with dialysis RN    3 hour via AVF on 3 K.  2 to 3 liter UF.    ROS     Intubated and sedated: ROS unable    Dialysis Parameters:     Vitals were reviewed  Patient Vitals for the past 8 hrs:   BP Temp Temp src Pulse Heart Rate Resp SpO2 Weight   08/10/18 1720 116/64 - - - 94 - 98 % -   08/10/18 1715 116/61 - - - 94 - 99 % -   08/10/18 1710 106/59 - - - 92 18 - -   08/10/18 1705 104/59 - - - 92 19 97 % -   08/10/18 1700 101/56 - - - 91 18 97 % -   08/10/18 1655 96/57 - - - 92 18 96 % -   08/10/18 1650 97/57 - - - 89 21 96 % -   08/10/18 1645 - - - - 96 20 95 % -   08/10/18 1640 118/64 - - - 95 17 95 % -   08/10/18 1635 115/64 - - - 93 18 95 % -   08/10/18 1630 119/63 - - - 94 16 95 % -   08/10/18 1625 117/65 - - - 92 18 95 % -   08/10/18 1620 114/67 - - - 90 18 94 % -   08/10/18 1615 109/61 - - - 90 21 - -   08/10/18 1610 108/63 - - - 89 16 - 64 kg (141 lb 1.5 oz)   08/10/18 1605 107/65 - - - 89 18 96 %  -   08/10/18 1600 100/63 96.4  F (35.8  C) Temporal - 87 19 96 % -   08/10/18 1555 96/60 - - - 87 20 98 % -   08/10/18 1550 95/60 - - - 89 16 99 % -   08/10/18 1545 95/60 - - - 89 19 99 % -   08/10/18 1540 91/56 - - - 92 13 100 % -   08/10/18 1535 (!) 84/53 - - 93 - 27 90 % -          Vitals:    08/10/18 1610   Weight: 64 kg (141 lb 1.5 oz)       Current Weight: 64  Dry Weight: last outpatient off weight 61.4  Dialysis Temp: 36.5  C  Access Device: AVF  Access Site: left  Dialyzer: Revaclear  Dialysis Bath: 3  Sodium Profile: n  UF Goal: 2 to 3  Blood Flow Rate (mL/min): 450  Total Treatment Time (hrs): 3  Heparin: Low dose as required      EPO dose: y  Zemplar: n  IV Fe: n      Medications and Allergies:     Reviewed      Physical Exam:     Seen and examined during course of dialysis run    /64  Pulse 93  Temp 96.4  F (35.8  C) (Temporal)  Resp 21  Wt 64 kg (141 lb 1.5 oz)  SpO2 96%  BMI 23.48 kg/m2    GENERAL: intubated/sedated  HEENT: ETT  RESP: clear anteriorly  CV: RRR, normal S1 S2  ABDOMEN: S/NT, BS present  MS: 1 to 2 plus edema    Data:         Recent Labs  Lab 08/10/18  1539      POTASSIUM 4.2   CHLORIDE 100   CO2 20   ANIONGAP 18*   *   *   CR 6.88*   GFRESTIMATED 8*   GFRESTBLACK 9*   TAMMY 8.0*       Recent Labs  Lab 08/10/18  1539   HGB 6.1*         G Rene Rodas    [facility] - Nephrology  [phone]

## 2018-08-10 NOTE — ED NOTES
Bed: ST01  Expected date:   Expected time:   Means of arrival:   Comments:  [facility] resp distress CHF intubated 78 male

## 2018-08-10 NOTE — IP AVS SNAPSHOT
` [place] [address] ONCOLOGY: [phone]                                              INTERAGENCY TRANSFER FORM - NURSING   8/10/2018                    Hospital Admission Date: 8/10/2018  EDMOND KLEIN   : 1940  Sex: Male        Attending Provider: Tom Hill MD     Allergies:  Ace Inhibitors, Penicillins    Infection:  None   Service:  HOSPITALIST    Ht:  --   Wt:  63.1 kg (139 lb 1.8 oz)   Admission Wt:  64 kg (141 lb 1.5 oz)    BMI:  --   BSA:  --            Patient PCP Information     Provider PCP Type    Shaila Silverman MD General      Current Code Status     Date Active Code Status Order ID Comments User Context       Prior      Code Status History     Date Active Date Inactive Code Status Order ID Comments User Context    2018 10:38 AM  DNR/DNI [id number]  Ellis Esquivel MD Outpatient    2018  9:57 AM 2018 10:38 AM DNR/DNI [id number] Place note in progress notes. Code status discussion is appropriately documented in the chart. Rex Haley MD Inpatient    2018  3:43 AM 2018  9:57 AM DNR/DNI [id number] Place note in progress notes. Code status discussion is appropriately documented in the chart. Mando Sharp MD Inpatient    2018 11:25 AM 2018  3:43 AM DNR [id number]  Veronika Hyatt RN Inpatient    8/10/2018  6:46 PM 2018 11:25 AM Full Code [id number]  Tom Hill MD Inpatient    2016  3:08 PM 8/10/2018  6:46 PM Full Code [id number]  Jeremy Pereira MD Outpatient    11/15/2016  9:40 PM 2016  3:08 PM Full Code [id number]  Bryon Oshea MD Inpatient    11/15/2016  4:51 PM 11/15/2016  9:40 PM Full Code [id number]  Rory Sloan MD Inpatient    2016 11:55 AM 11/15/2016  4:51 PM Full Code [id number]  Rich Garcia MD Outpatient    2016  2:00 AM 2016 11:55 AM Full Code [id number]  Kalyn Olguin MD [facility] [place]    2016  9:49 AM 2016  2:00 AM Full Code [id number]   Tom House MD Outpatient    4/19/2016  9:08 PM 4/20/2016  9:49 AM Full Code [id number]  Pablo Espinal MD Inpatient    12/24/2014  8:27 PM 12/28/2014  4:19 PM Full Code [id number]  Lazarus Oscar, DO Inpatient      Advance Directives        Scanned docmt in ACP Activity?           No scanned doc        Hospital Problems as of 8/14/2018              Priority Class Noted POA    Acute respiratory failure (H) Medium  8/10/2018 Yes      Non-Hospital Problems as of 8/14/2018              Priority Class Noted    Coronary artery disease Medium  Unknown    Hyperlipidemia Medium  Unknown    Hypertension Medium  Unknown    Myocardial infarction Medium  Unknown    Peripheral vascular disease (H) Medium  Unknown    Gastroesophageal reflux disease Medium  Unknown    Diabetes (H) Medium  Unknown    UTI (urinary tract infection) Medium  12/24/2014    Left carotid stenosis Medium  4/19/2016    Anemia in chronic kidney disease Medium  5/10/2016    Anemia in stage 4 chronic kidney disease (H) Medium  5/10/2016    Diverticulitis of colon Medium  7/24/2016    Diarrhea Medium  7/24/2016    Diverticulitis Medium  7/24/2016    Physical deconditioning Medium  7/28/2016    C. difficile colitis Medium  7/28/2016    CKD (chronic kidney disease) stage 4, GFR 15-29 ml/min (H) Medium  7/28/2016    Benign hypertension with chronic kidney disease, stage IV (H) Medium  7/28/2016    Pseudoaneurysm (H) Medium  11/15/2016    Pseudoaneurysm of AV hemodialysis fistula (H) Medium  11/15/2016      Immunizations     Name Date      HepB 12/27/16     HepB 12/03/16     HepB 11/05/16     Influenza (High Dose) 3 valent vaccine 10/10/17     Influenza (High Dose) 3 valent vaccine 10/29/15     Influenza (High Dose) 3 valent vaccine 09/03/14     Influenza (High Dose) 3 valent vaccine 10/31/13     Influenza (High Dose) 3 valent vaccine 08/26/11     Influenza (intradermal) 11/12/16     Mantoux Tuberculin Skin Test 11/19/16     Mantoux Tuberculin  Skin Test 11/15/16     Pneumo Conj 13-V (2010&after) 10/29/15     Pneumococcal 23 valent 08/14/14     Tdap (Adacel,Boostrix) 03/08/83          END      ASSESSMENT     Discharge Profile Flowsheet     EXPECTED DISCHARGE     Inspection of bony prominences  Full 08/14/18 0927    Expected Discharge Date  08/14/18 (hospice placement with [facility]) 08/13/18 1436   Full except areas not inspected   Ear, left;Ear, right;Scapula, left;Scapula, right;Spine;Hip, left;Hip, right;Buttock, left;Buttock, right;Sacrum;Coccyx 08/13/18 0225    DISCHARGE NEEDS ASSESSMENT     Inspection under devices  Full except (identify device(s) not inspected) 08/12/18 2239    Equipment Currently Used at Home  grab bar;cane, straight;raised toilet (built in shower stools) 07/24/16 1318   Not Inspected under devices  -- 08/12/18 0318    Equipment Used at Home  cane, straight;grab bar;bath bench 12/25/14 1318   Skin WDL  ex 08/14/18 0927    GASTROINTESTINAL (ADULT,PEDIATRIC,OB)     Skin Color/Characteristics  bruised (ecchymotic) 08/14/18 0927    GI WDL  -- (deferred, comfort cares) 08/14/18 0927   Skin Temperature  warm 08/14/18 0927    Abdominal Appearance  rounded 08/12/18 0453   Skin Moisture  dry 08/14/18 0927    All Quadrants Bowel Sounds  audible and normoactive 08/12/18 0236   Skin Elasticity  slow return to original state 08/14/18 0927    Last Bowel Movement  08/10/18 08/14/18 0058   Skin Integrity  bruise(s);drain/device(s);scab(s);scar(s) 08/14/18 0927    GI Signs/Symptoms  diarrhea 08/10/18 2014   SAFETY      Passing flatus  no 08/11/18 1635   Safety WDL  WDL 08/14/18 0927    COMMUNICATION ASSESSMENT     Safety Equipment  suction equipment 08/14/18 0927    Patient's communication style  spoken language (English or Bilingual) 08/10/18 1537   All Alarms  none present 08/14/18 0927    SKIN                        Assessment WDL (Within Defined Limits) Definitions           Safety WDL     Effective: 09/28/15    Row Information: <b>WDL  "Definition:</b> Bed in low position, wheels locked; call light in reach; upper side rails up x 2; ID band on<br> <font color=\"gray\"><i>Item=AS safety wdl>>List=AS safety wdl>>Version=F14</i></font>      Skin WDL     Effective: 09/28/15    Row Information: <b>WDL Definition:</b> Warm; dry; intact; elastic; without discoloration; pressure points without redness<br> <font color=\"gray\"><i>Item=AS skin wdl>>List=AS skin wdl>>Version=F14</i></font>      Vitals     Vital Signs Flowsheet     COMMENTS     ANALGESIA SIDE EFFECTS MONITORING      Comments  Comfort measures in place. 08/12/18 0926   Side Effects Monitoring: Respiratory Quality  I 08/14/18 0927    VITAL SIGNS     Side Effects Monitoring: Respiratory Depth  N 08/14/18 0927    Temp  100  F (37.8  C) 08/11/18 2257   Side Effects Monitoring: Sedation Level  2 08/14/18 0927    Temp src  Esophageal 08/11/18 2034   HEIGHT AND WEIGHT      Resp  18 08/14/18 0927   Weight  63.1 kg (139 lb 1.8 oz) 08/11/18 0328    Pulse  97 08/10/18 1924   Weight Method  Bed scale 08/11/18 0328    Heart Rate  79 08/11/18 2257   EKG MONITORING      BP  104/52 08/11/18 2253   Cardiac Regularity  Regular 08/10/18 1558    BP Location  Right arm 08/11/18 1856   Cardiac Rhythm  NSR 08/10/18 1558    OXYGEN THERAPY     ALYSHA COMA SCALE      SpO2  (!)  78 % 08/12/18 0544   Best Eye Response  -- 08/13/18 1103    O2 Device  None (Room air) 08/12/18 1008   Best Motor Response  6-->(M6) obeys commands 08/12/18 1427    FiO2 (%)  40 % 08/11/18 2034   Best Verbal Response  5-->(V5) oriented 08/12/18 1427    Oxygen Delivery  8 LPM 08/11/18 2248   Alysha Coma Scale Score  14 08/12/18 1427    PAIN/COMFORT     Assessment Qualifiers  patient not sedated/intubated 08/12/18 0927    Patient Currently in Pain  sleeping: patient not able to self report 08/14/18 0927   ECG      Preferred Pain Scale  Adult Non-Verbal ([facility] Only) 08/14/18 0541   ECG Rhythm  Sinus rhythm 08/12/18 0109    Patient's Stated Pain Goal  " No pain 08/12/18 0927   Ectopy  None 08/11/18 1011    Word Pain Scale  4 08/12/18 1121   Ectopy Frequency  Occasional 08/10/18 1947    FACES Pain Rating  0-->no hurt 08/14/18 0042   Lead Monitored  Lead II;V 1 08/11/18 0639    Pain Location  Generalized 08/13/18 1411   DRUG CALCULATION WEIGHT      Pain Descriptors  Aching;Constant;Dull 08/12/18 0927   Drug Calculation Weight  64 kg (141 lb 1.5 oz) 08/10/18 1615    Nonverbal Indicators Of Pain  moaning 08/14/18 0913   POSITIONING      Pain Intervention(s)  Medication (See eMAR) 08/14/18 0913   Body Position  side-lying, left (in chair) 08/13/18 1407    Response to Interventions  Decrease in pain 08/14/18 0927   Head of Bed (HOB)  HOB at 20-30 degrees 08/13/18 0444    PAIN ASSESSMENT/NONVERBAL ICU (ADULT)     Positioning/Transfer Devices  pillows;in use 08/14/18 0930    Presence of Pain  No nonverbal indicators of pain present 08/14/18 0742   Chair  Recline and up in chair (per pt request) 08/14/18 0930    CRITICAL-CARE PAIN OBSERVATION TOOL (CPOT)     DAILY CARE      Facial Expression  0 08/12/18 1123   Activity Management  up in chair 08/14/18 0927    Body Movements  0 08/12/18 1123   Activity Assistance Provided  assistance, 2 people 08/13/18 1747    Compliance w/ventilator (intubated patients)  Extubated 08/12/18 1123   Assistive Device Utilized  mechanical lift 08/13/18 1747    Vocalization (extubated patients)  0 08/12/18 1123   POINT OF CARE TESTING      Muscle Tension  0 08/12/18 1123   Puncture Site  -- (R.IJ) 08/11/18 0905    Total  0 08/12/18 1123   Bedside Glucose (mg/dl )   158 mg/dl 08/11/18 0905            Patient Lines/Drains/Airways Status    Active LINES/DRAINS/AIRWAYS     Name: Placement date: Placement time: Site: Days: Last dressing change:    Urethral Catheter Straight-tip 08/10/18   1555   Straight-tip   3     Hemodialysis Vascular Access Arteriovenous fistula Left Arm       Arm        Peripheral IV 08/10/18 Right 08/10/18   1541      3      Peripheral IV 08/10/18 Right Lower forearm 08/10/18   1706   Lower forearm   3     Wound 08/10/18 Right Hand Abrasion(s) 08/10/18   1740   Hand   3             Patient Lines/Drains/Airways Status    Active PICC/CVC     None            Intake/Output Detail Report     Date Intake           Output     Net    Shift P.O. I.V. NG/GT IV Piggyback Colloid Blood Components Total Urine Emesis/NG output Other Total       Noc 08/12/18 2300 - 08/13/18 0659 -- -- -- -- -- -- -- 50 -- -- 50 -50    Day 08/13/18 0700 - 08/13/18 1459 -- -- -- -- -- -- -- -- -- -- -- 0    Kyra 08/13/18 1500 - 08/13/18 2259 -- -- -- -- -- -- -- -- -- -- -- 0    Noc 08/13/18 2300 - 08/14/18 0659 -- -- -- -- -- -- -- 100 -- -- 100 -100    Day 08/14/18 0700 - 08/14/18 1459 -- -- -- -- -- -- -- -- -- -- -- 0      Case Management/Discharge Planning     Case Management/Discharge Planning Flowsheet     REFERRAL INFORMATION     COPING/STRESS      Did the Initial Social Work Assessment result in a Social Work Case?  Yes 08/13/18 1049   Major Change/Loss/Stressor  denies 08/12/18 0757    Arrived From  home or self-care 07/24/16 1158   EXPECTED DISCHARGE      Referral Source  physician 08/13/18 1049   Expected Discharge Date  08/14/18 (hospice placement with  hospice) 08/13/18 1436    Reason For Consult  discharge planning;end of life/hospice 08/13/18 1049   DISCHARGE PLANNING      Record Reviewed  medical record 08/13/18 1049   Equipment Used at Home  cane, straight;grab bar;bath bench 12/25/14 1318    CTS Assigned to Case  -- (González Motta) 08/13/18 1049   FINAL RESOURCES      LIVING ENVIRONMENT     Equipment Currently Used at Home  grab bar;cane, straight;raised toilet (built in shower stools) 07/24/16 1318    Lives With  spouse 08/11/18 1952   ABUSE RISK SCREEN      Living Arrangements  [place] 08/13/18 1049   QUESTION TO PATIENT:  Has a member of your family or a partner(now or in the past) intimidated, hurt, manipulated, or controlled you in any way?   patient declined to answer or is unable to answer 08/10/18 1638    Quality Of Family Relationships  supportive;involved 08/13/18 1049   QUESTION TO PATIENT: Do you feel safe going back to the place where you are living?  patient declined to answer or is unable to answer 08/10/18 1638    Able to Return to Prior Living Arrangements  no 08/13/18 1049   OBSERVATION: Is there reason to believe there has been maltreatment of a vulnerable adult (ie. Physical/Sexual/Emotional abuse, self neglect, lack of adequate food, shelter, medical care, or financial exploitation)?  no 08/10/18 1638    ASSESSMENT OF FAMILY/SOCIAL SUPPORT     OTHER      Marital Status   08/13/18 1049   Are you depressed or being treated for depression?  No 08/12/18 0757    Who is your support system?  Wife;Children 08/13/18 1049   HOMICIDE RISK      Description of Support System  Supportive;Involved 08/13/18 1049   Feels Like Hurting Others  no (per family ) 08/10/18 1638    Support Assessment  Adequate family and caregiver support 08/13/18 1049

## 2018-08-10 NOTE — IP AVS SNAPSHOT
MRN:[id number]                      After Visit Summary   8/10/2018    Bean Croft    MRN: [id number]           Thank you!     Thank you for choosing [facility] for your care. Our goal is always to provide you with excellent care. Hearing back from our patients is one way we can continue to improve our services. Please take a few minutes to complete the written survey that you may receive in the mail after you visit with us. Thank you!        Patient Information     Date Of Birth          1940        Designated Caregiver       Most Recent Value    Caregiver    Will someone help with your care after discharge? yes    Name of designated caregiver Spouse and children    Phone number of caregiver See contact information    Caregiver address See contact information      About your hospital stay     You were admitted on:  August 10, 2018 You last received care in the:  [address]    You were discharged on:  August 14, 2018        Reason for your hospital stay       Admitted for NSTEMI, acute respiratory failure secondary to fluid overload in setting of acute systolic CHF exacerbation and ESRD on dialysis, acute GI bleed with acute blood loss anemia and chronic anemia associated with renal failure, and community acquired pneumonia with unidentified organism.                  Who to Call     For medical emergencies, please call 911.  For non-urgent questions about your medical care, please call your primary care provider or clinic, [phone]          Attending Provider     Provider Specialty    Tom Barone DO Emergency Medicine    Tom Hill MD Internal Medicine       Primary Care Provider Office Phone # Fax #    Shaila Rema Silverman [phone]      After Care Instructions     Advance Diet as Tolerated       Follow this diet upon discharge: Orders Placed This Encounter      Regular Diet Adult              General info for SNF        Length of Stay Estimate: Short Term Care: Estimated # of Days <30  Condition at Discharge: Terminal  Level of care: skilled   Rehabilitation Potential: N/A  Admission H&P remains valid and up-to-date: Yes  Recent Chemotherapy: N/A  Use Nursing Home Standing Orders: Yes            Mantoux instructions       Give two-step Mantoux (PPD) Per Facility Policy Yes                  Follow-up Appointments     Follow Up and recommended labs and tests       Follow up with Nursing home physician as needed for end of life care.                  Further instructions from your care team       Discharge to [facility], phone [phone]    Pending Results     Date and Time Order Name Status Description    8/10/2018 1633 Blood culture Preliminary     8/10/2018 1606 Blood culture Preliminary             Statement of Approval     Ordered          08/14/18 1040  I have reviewed and agree with all the recommendations and orders detailed in this document.  EFFECTIVE NOW     Approved and electronically signed by:  Ellis Esquivel MD             Admission Information     Date & Time Provider Department Dept. Phone    8/10/2018 Tom Hill MD [place] [id number] Oncology [phone]      Your Vitals Were     Blood Pressure Pulse Temperature Respirations Weight Pulse Oximetry    104/52 97 100  F (37.8  C) 18 63.1 kg (139 lb 1.8 oz) 78%    BMI (Body Mass Index)                   23.15 kg/m2           MyChart Information     [facility] gives you secure access to your electronic health record. If you see a primary care provider, you can also send messages to your care team and make appointments. If you have questions, please call your primary care clinic.  If you do not have a primary care provider, please call [phone] and they will assist you.        Care EveryWhere ID     This is your Care EveryWhere ID. This could be used by other organizations to access your [facility] medical records  [id number]        Equal  Access to Services     [facility]: Hadii aad ku hadallysonelizabeth Carmengoran, waaxda luqadaha, qaybta kaalmadaryl vaughn, madison richards. [facility] [phone].    ATENCIÓN: Si habla español, tiene a tidwell disposición servicios gratuitos de asistencia lingüística. Llame al [phone].    We comply with applicable federal civil rights laws and [place] laws. We do not discriminate on the basis of race, color, national origin, age, disability, sex, sexual orientation, or gender identity.               Review of your medicines      START taking        Dose / Directions    acetaminophen 650 MG Suppository   Commonly known as:  TYLENOL   Used for:  Hospice care patient        Dose:  650 mg   Place 1 suppository (650 mg) rectally every 6 hours as needed for mild pain or fever (temperature over 100? F )   Quantity:  60 suppository   Refills:  0       atropine 1 % ophthalmic solution   Used for:  Hospice care patient        Dose:  1-2 drop   Place 1-2 drops under the tongue every hour as needed for other (secretions)   Quantity:  1 Bottle   Refills:  0       bisacodyl 10 MG Suppository   Commonly known as:  DULCOLAX   Used for:  Hospice care patient        Dose:  10 mg   Start taking on:  8/16/2018   Place 1 suppository (10 mg) rectally once as needed for other (for no bowel movement for 72 hours)   Quantity:  12 suppository   Refills:  0       haloperidol 2 MG/ML (HIGH CONC) solution   Commonly known as:  HALDOL   Used for:  Hospice care patient        Dose:  1 mg   Take 0.5 mLs (1 mg) by mouth every 6 hours as needed for agitation (delirium, hallucinations, restlessness)   Quantity:  60 mL   Refills:  0       HYDROmorphone 10 mg/mL (HIGH CONC) oral solution   Commonly known as:  DILAUDID   Used for:  Hospice care patient        Dose:  4 mg   Place 0.4 mLs (4 mg) under the tongue every 2 hours as needed for moderate to severe pain (SOB, dyspnea, distress)   Quantity:  30 mL   Refills:  0       LORazepam 2  MG/ML (HIGH CONC) solution   Commonly known as:  LORazepam INTENSOL   Used for:  Hospice care patient        Dose:  2 mg   Take 1 mL (2 mg) by mouth every 4 hours as needed for anxiety   Quantity:  15 mL   Refills:  0         STOP taking     allopurinol 100 MG tablet   Commonly known as:  ZYLOPRIM           amLODIPine 10 MG tablet   Commonly known as:  NORVASC           aspirin 81 MG EC tablet           DIALYVITE 800/ULTRA D Tabs           docusate sodium 100 MG capsule   Commonly known as:  COLACE           donepezil 5 MG tablet   Commonly known as:  ARIcept           flurbiprofen 0.03 % ophthalmic solution   Commonly known as:  OCUFEN           furosemide 80 MG tablet   Commonly known as:  LASIX           hydrALAZINE 100 MG Tabs tablet   Commonly known as:  APRESOLINE           metoprolol succinate 50 MG 24 hr tablet   Commonly known as:  TOPROL-XL           omeprazole 20 MG CR capsule   Commonly known as:  priLOSEC           simvastatin 40 MG tablet   Commonly known as:  ZOCOR                Where to get your medicines      Some of these will need a paper prescription and others can be bought over the counter. Ask your nurse if you have questions.     Bring a paper prescription for each of these medications     acetaminophen 650 MG Suppository    atropine 1 % ophthalmic solution    bisacodyl 10 MG Suppository    haloperidol 2 MG/ML (HIGH CONC) solution    HYDROmorphone 10 mg/mL (HIGH CONC) oral solution    LORazepam 2 MG/ML (HIGH CONC) solution                Protect others around you: Learn how to safely use, store and throw away your medicines at www.disposemymeds.org.        Information about OPIOIDS     PRESCRIPTION OPIOIDS: WHAT YOU NEED TO KNOW   We gave you an opioid (narcotic) pain medicine. It is important to manage your pain, but opioids are not always the best choice. You should first try all the other options your care team gave you. Take this medicine for as short a time (and as few doses) as  possible.    Some activities can increase your pain, such as bandage changes or therapy sessions. It may help to take your pain medicine 30 to 60 minutes before these activities. Reduce your stress by getting enough sleep, working on hobbies you enjoy and practicing relaxation or meditation. Talk to your care team about ways to manage your pain beyond prescription opioids.    These medicines have risks:    DO NOT drive when on new or higher doses of pain medicine. These medicines can affect your alertness and reaction times, and you could be arrested for driving under the influence (DUI). If you need to use opioids long-term, talk to your care team about driving.    DO NOT operate heavy machinery    DO NOT do any other dangerous activities while taking these medicines.    DO NOT drink any alcohol while taking these medicines.     If the opioid prescribed includes acetaminophen, DO NOT take with any other medicines that contain acetaminophen. Read all labels carefully. Look for the word  acetaminophen  or  Tylenol.  Ask your pharmacist if you have questions or are unsure.    You can get addicted to pain medicines, especially if you have a history of addiction (chemical, alcohol or substance dependence). Talk to your care team about ways to reduce this risk.    All opioids tend to cause constipation. Drink plenty of water and eat foods that have a lot of fiber, such as fruits, vegetables, prune juice, apple juice and high-fiber cereal. Take a laxative (Miralax, milk of magnesia, Colace, Senna) if you don t move your bowels at least every other day. Other side effects include upset stomach, sleepiness, dizziness, throwing up, tolerance (needing more of the medicine to have the same effect), physical dependence and slowed breathing.    Store your pills in a secure place, locked if possible. We will not replace any lost or stolen medicine. If you don t finish your medicine, please throw away (dispose) as directed by your  pharmacist. The [place] [employer] has more information about safe disposal: https://www.pca.[facility].us/living-green/managing-unwanted-medications             Medication List: This is a list of all your medications and when to take them. Check marks below indicate your daily home schedule. Keep this list as a reference.      Medications           Morning Afternoon Evening Bedtime As Needed    acetaminophen 650 MG Suppository   Commonly known as:  TYLENOL   Place 1 suppository (650 mg) rectally every 6 hours as needed for mild pain or fever (temperature over 100? F )                                   atropine 1 % ophthalmic solution   Place 1-2 drops under the tongue every hour as needed for other (secretions)   Last time this was given:  1 drop on 8/14/2018 12:10 PM                                   bisacodyl 10 MG Suppository   Commonly known as:  DULCOLAX   Place 1 suppository (10 mg) rectally once as needed for other (for no bowel movement for 72 hours)   Start taking on:  8/16/2018                                   haloperidol 2 MG/ML (HIGH CONC) solution   Commonly known as:  HALDOL   Take 0.5 mLs (1 mg) by mouth every 6 hours as needed for agitation (delirium, hallucinations, restlessness)                                   HYDROmorphone 10 mg/mL (HIGH CONC) oral solution   Commonly known as:  DILAUDID   Place 0.4 mLs (4 mg) under the tongue every 2 hours as needed for moderate to severe pain (SOB, dyspnea, distress)   Last time this was given:  4 mg on 8/14/2018 12:46 PM                                   LORazepam 2 MG/ML (HIGH CONC) solution   Commonly known as:  LORazepam INTENSOL   Take 1 mL (2 mg) by mouth every 4 hours as needed for anxiety   Last time this was given:  2 mg on 8/14/2018  5:26 AM

## 2018-08-10 NOTE — IP AVS SNAPSHOT
[place] [address] ONCOLOGY: [phone]                                              INTERAGENCY TRANSFER FORM - LAB / IMAGING / EKG / EMG RESULTS   8/10/2018                    Hospital Admission Date: 8/10/2018  EDMOND KLEIN   : 1940  Sex: Male        Attending Provider: Tom Hill MD     Allergies:  Ace Inhibitors, Penicillins    Infection:  None   Service:  HOSPITALIST    Ht:  --   Wt:  63.1 kg (139 lb 1.8 oz)   Admission Wt:  64 kg (141 lb 1.5 oz)    BMI:  --   BSA:  --            Patient PCP Information     Provider PCP Type    Shaila Silverman MD General         Lab Results - 3 Days      Blood culture [[id number]]  Resulted: 18 0247, Result status: Preliminary result    Ordering provider: Tom Barone,   08/10/18 1606 Resulting lab: [facility]    Specimen Information    Type Source Collected On   Blood Arm, Right 08/10/18 1605   Comment:  Right Arm          Components       Value Reference Range Flag Lab   Specimen Description Blood Right Arm      Special Requests Aerobic and anaerobic bottles received   FrStHsLb   Culture Micro No growth after 4 days   226            Blood culture [[id number]]  Resulted: 18 0247, Result status: Preliminary result    Ordering provider: Tom Barone DO  08/10/18 1633 Resulting lab: [facility]CRO RAPID TESTING LAB    Specimen Information    Type Source Collected On   Blood Hand, Right 08/10/18 1633   Comment:  Right Hand          Components       Value Reference Range Flag Lab   Specimen Description Blood Right Hand      Special Requests Aerobic and anaerobic bottles received   FrStHsLb   Culture Micro No growth after 4 days   226            Urine Culture [[id number]]  Resulted: 184, Result status: Final result    Ordering provider: Tom Barone DO  08/10/18 1606 Resulting lab: INFECTIOUS DISEASE DIAGNOSTIC LAB[facility]AT[facility]    Specimen Information    Type Source Collected  On   Catheterized Urine  08/10/18 1553          Components       Value Reference Range Flag Lab   Specimen Description Catheterized Urine      Special Requests Specimen received in preservative   75   Culture Micro No growth   225            Troponin I [[id number]] (Abnormal)  Resulted: 08/11/18 1743, Result status: Final result    Ordering provider: Bandar Gibbs MD  08/11/18 1200 Resulting lab: [facility]    Specimen Information    Type Source Collected On   Blood  08/11/18 1700          Components       Value Reference Range Flag Lab   Troponin I ES 27.921 0.000 - 0.045 ug/L  FrStHsLb   Comment:         The 99th percentile for upper reference range is 0.045 ug/L.  Troponin values   in the range of 0.045 - 0.120 ug/L may be associated with risks of adverse   clinical events.  Previous critical documented              Hemoglobin (Every 6 Hrs x3) [[id number]] (Abnormal)  Resulted: 08/11/18 1719, Result status: Final result    Ordering provider: Tom Hill MD  08/11/18 1200 Resulting lab: [facility]    Specimen Information    Type Source Collected On   Blood  08/11/18 1700          Components       Value Reference Range Flag Lab   Hemoglobin 8.3 13.3 - 17.7 g/dL L FrStHsLb            Glucose by meter [[id number]] (Abnormal)  Resulted: 08/11/18 1714, Result status: Final result    Ordering provider: Tom Hill MD  08/11/18 1702 Resulting lab: POINT OF CARE TEST, GLUCOSE    Specimen Information    Type Source Collected On     08/11/18 1702          Components       Value Reference Range Flag Lab   Glucose 146 70 - 99 mg/dL H 170            Hemoglobin (Every 6 Hrs x3) [[id number]] (Abnormal)  Resulted: 08/11/18 1038, Result status: Final result    Ordering provider: Tom Hill MD  08/11/18 1005 Resulting lab: [facility]    Specimen Information    Type Source Collected On   Blood  08/11/18 1006          Components       Value Reference Range Flag  Lab   Hemoglobin 8.9 13.3 - 17.7 g/dL L FrStHsLb            Troponin I [[id number]] (Abnormal)  Resulted: 08/11/18 0947, Result status: Final result    Ordering provider: Bandar Gibbs MD  08/11/18 0428 Resulting lab: [facility]    Specimen Information    Type Source Collected On   Blood  08/11/18 0900          Components       Value Reference Range Flag Lab   Troponin I ES 32.861 0.000 - 0.045 ug/L HH FrStHsLb   Comment:         The 99th percentile for upper reference range is 0.045 ug/L.  Troponin values   in the range of 0.045 - 0.120 ug/L may be associated with risks of adverse   clinical events.  Previous critical documented              Glucose by meter [[id number]] (Abnormal)  Resulted: 08/11/18 0915, Result status: Final result    Ordering provider: Tom Hill MD  08/11/18 0904 Resulting lab: POINT OF CARE TEST, GLUCOSE    Specimen Information    Type Source Collected On     08/11/18 0904          Components       Value Reference Range Flag Lab   Glucose 158 70 - 99 mg/dL H 170            Vancomycin level [[id number]]  Resulted: 08/11/18 0545, Result status: Final result    Ordering provider: Bandar Gibbs MD  08/11/18 0503 Resulting lab: [facility]    Specimen Information    Type Source Collected On   Blood  08/11/18 0500          Components       Value Reference Range Flag Lab   Vancomycin Level 11.1 mg/L  FrStHsLb   Comment:         Traditional Dosing therapeutic Range:         Trough 8-20 mg/L         Peak 20-50 mg/L              Blood gas arterial with oxyhemoglobin [[id number]] (Abnormal)  Resulted: 08/11/18 0535, Result status: Final result    Ordering provider: Bandar Gibbs MD  08/11/18 0459 Resulting lab: [facility]    Specimen Information    Type Source Collected On   Blood  08/11/18 0500          Components       Value Reference Range Flag Lab   pH Arterial 7.45 7.35 - 7.45 pH  FrStHsLb   pCO2 Arterial 41 35 - 45 mm Hg   FrStHsLb   pO2 Arterial 118 80 - 105 mm Hg H FrStHsLb   Bicarbonate Arterial 28 21 - 28 mmol/L  FrStHsLb   Oxyhemoglobin Arterial 98 92 - 100 %  FrStHsLb   Base Excess Art 3.8 mmol/L  FrStHsLb   Comment:  Abnormal Result, Ref range: -9.0 to 1.8            Hemoglobin [[id number]] (Abnormal)  Resulted: 08/11/18 0528, Result status: Final result    Ordering provider: Bandar Gibbs MD  08/11/18 0503 Resulting lab: [facility]    Specimen Information    Type Source Collected On   Blood  08/11/18 0500          Components       Value Reference Range Flag Lab   Hemoglobin 8.6 13.3 - 17.7 g/dL L FrStHsLb            CBC (AM Draw) [[id number]] (Abnormal)  Resulted: 08/11/18 0341, Result status: Final result    Ordering provider: Tom Hill MD  08/11/18 0304 Resulting lab: [facility]    Specimen Information    Type Source Collected On   Blood  08/11/18 0300          Components       Value Reference Range Flag Lab   WBC 15.1 4.0 - 11.0 10e9/L H FrStHsLb   RBC Count 2.78 4.4 - 5.9 10e12/L L FrStHsLb   Hemoglobin 8.8 13.3 - 17.7 g/dL L FrStHsLb   Hematocrit 26.8 40.0 - 53.0 % L FrStHsLb   MCV 96 78 - 100 fl  FrStHsLb   MCH 31.7 26.5 - 33.0 pg  FrStHsLb   MCHC 32.8 31.5 - 36.5 g/dL  FrStHsLb   RDW 18.6 10.0 - 15.0 % H FrStHsLb   Platelet Count 232 150 - 450 10e9/L  FrStHsLb            Troponin I [[id number]] (Abnormal)  Resulted: 08/11/18 0341, Result status: Final result    Ordering provider: Tom Hill MD  08/10/18 1902 Resulting lab: [facility]    Specimen Information    Type Source Collected On   Blood  08/11/18 0300          Components       Value Reference Range Flag Lab   Troponin I ES 28.173 0.000 - 0.045 ug/L HH FrStHsLb   Comment:         The 99th percentile for upper reference range is 0.045 ug/L.  Troponin values   in the range of 0.045 - 0.120 ug/L may be associated with risks of adverse   clinical events.  Previous critical documented              Basic  metabolic panel (AM Draw) [[id number]] (Abnormal)  Resulted: 08/11/18 0335, Result status: Final result    Ordering provider: Tom Hill MD  08/11/18 0308 Resulting lab: [facility]    Specimen Information    Type Source Collected On   Blood  08/11/18 0300          Components       Value Reference Range Flag Lab   Sodium 138 133 - 144 mmol/L  FrStHsLb   Potassium 4.2 3.4 - 5.3 mmol/L  FrStHsLb   Chloride 99 94 - 109 mmol/L  FrStHsLb   Carbon Dioxide 28 20 - 32 mmol/L  FrStHsLb   Anion Gap 11 3 - 14 mmol/L  FrStHsLb   Glucose 144 70 - 99 mg/dL H FrStHsLb   Urea Nitrogen 42 7 - 30 mg/dL H FrStHsLb   Creatinine 3.70 0.66 - 1.25 mg/dL H FrStHsLb   GFR Estimate 16 >60 mL/min/1.7m2 L FrStHsLb   Comment:  Non  GFR Calc   GFR Estimate If Black 19 >60 mL/min/1.7m2 L FrStHsLb   Comment:  African American GFR Calc   Calcium 7.6 8.5 - 10.1 mg/dL L FrStHsLb            Magnesium (AM Draw) [[id number]]  Resulted: 08/11/18 0335, Result status: Final result    Ordering provider: Tom Hill MD  08/11/18 0308 Resulting lab: [facility]    Specimen Information    Type Source Collected On   Blood  08/11/18 0300          Components       Value Reference Range Flag Lab   Magnesium 1.7 1.6 - 2.3 mg/dL  FrStHsLb            Phosphorus (AM Draw) [[id number]]  Resulted: 08/11/18 0335, Result status: Final result    Ordering provider: Tom Hill MD  08/11/18 0308 Resulting lab: [facility]    Specimen Information    Type Source Collected On   Blood  08/11/18 0300          Components       Value Reference Range Flag Lab   Phosphorus 4.5 2.5 - 4.5 mg/dL  FrStHsLb            Testing Performed By     Lab - Abbreviation Name Director Address Valid Date Range    14 - FrStHsLb [facility] Unknown [address] [place] [address] 05/08/15 1057 - Present    75 - Unknown [facility] Unknown [address] [facility] [address]  01/15/15 1019 - Present    170 - Unknown POINT OF CARE TEST, GLUCOSE Unknown Unknown 10/31/11 1114 - Present    225 - Unknown INFECTIOUS DISEASE DIAGNOSTIC LABORATORY Unknown [address] [facility] [address] 14 0954 - Present    226 - Unknown MICRO RAPID TESTING LAB Unknown [address] [facility] [address] 14 0955 - Present            Unresulted Labs     None         ECG/EMG Results      ECHO COMPLETE WITH OPTISON [[id number]]  Resulted: 18, Result status: Edited Result - FINAL    Ordering provider: Rosemary Gibbs MD  18 Resulted by: Omi Boyd MD    Performed: 1842 - 1844 Resulting lab: RADIOLOGY RESULTS    Narrative:       [id number]  [id number]  [id number]  [id number]^TITI^ROSEMARY^JENNY           [facility]  Echocardiography Laboratory  [address] [place] [address]        Name: EDMOND KLEIN  MRN: [id number]  : 1940  Study Date: 2018 05:13 AM  Age: 78 yrs  Gender: Male  Patient Location: [facility]  Reason For Study: Cardiac Arrest  Ordering Physician: ROSEMARY GIBBS  Referring Physician: DAVID ALONSO  Performed By: Kina Madsen RDCS     BSA: 1.7 m2  Height: 65 in  Weight: 139 lb  HR: 76  BP: 98/58 mmHg  _____________________________________________________________________________  __        Procedure  Complete Echo Adult. Contrast Optison.  _____________________________________________________________________________  __        Interpretation Summary     The left ventricle is normal in size.  The visual ejection fraction is estimated at 25-30%.  There is severe anterior, septal, and apical wall hypokinesis.  There is no thrombus seen in the left ventricle.  No significant valvular heart disease.     The appearance of the wall motion abnormalities is consistent with either LAD  occlusion or stress cardiomyopathy.  _____________________________________________________________________________  __         Left Ventricle  The left ventricle is normal in size. There is normal left ventricular wall  thickness. The visual ejection fraction is estimated at 25-30%. Diastolic  Doppler findings (E/E' ratio and/or other parameters) suggest left ventricular  filling pressures are increased. There is severe anterior, septal, and apical  wall hypokinesis. There is no thrombus seen in the left ventricle.     Right Ventricle  The right ventricle is normal in size and function.     Atria  Normal left atrial size. Right atrial size is normal. There is no color  Doppler evidence of an atrial shunt.     Mitral Valve  The mitral valve leaflets are mildly thickened. There is mild (1+) mitral  regurgitation.        Tricuspid Valve  There is trace tricuspid regurgitation. Normal IVC (1.5-2.5cm) with >50%  respiratory collapse; right atrial pressure is estimated at 5-10mmHg.     Aortic Valve  There is trivial trileaflet aortic sclerosis. No aortic regurgitation is  present.     Pulmonic Valve  There is no pulmonic valvular regurgitation.     Vessels  Normal size aorta. The aortic root is normal size.     Pericardium  There is no pericardial effusion.        Rhythm  Sinus rhythm was noted.  _____________________________________________________________________________  __  MMode/2D Measurements & Calculations  IVSd: 1.0 cm     LVIDd: 5.0 cm  LVIDs: 4.1 cm  LVPWd: 1.4 cm  FS: 17.4 %  LV mass(C)d: 230.7 grams  LV mass(C)dI: 136.2 grams/m2  Ao root diam: 3.3 cm  LA dimension: 4.2 cm  asc Aorta Diam: 2.6 cm  LA/Ao: 1.2  LVOT diam: 2.4 cm  LVOT area: 4.5 cm2  LA Volume (BP): 56.9 ml  LA Volume Index (BP): 33.7 ml/m2  RWT: 0.56           Doppler Measurements & Calculations  MV E max kat[place]ka: 80.0 cm/sec  MV A max kat[place]ka: 64.5 cm/sec  MV E/A: 1.2  MV dec time: 0.21 sec  E/E' av.7  Lateral E/e': 19.7  Medial E/e': 19.7           _____________________________________________________________________________  __           Report approved by: Omi  Forest Boyd 08/11/2018 10:39 AM       1    Type Source Collected On     08/11/18 0513          View Image (below)        Echocardiogram Complete [[id number]]  Resulted: 08/11/18 0543, Result status: In process    Ordering provider: Bandar Gibbs MD  08/11/18 0422 Performed: 08/11/18 0542 - 08/11/18 0542    Resulting lab: [facility]                   Encounter-Level Documents:     There are no encounter-level documents.      Order-Level Documents:     There are no order-level documents.

## 2018-08-10 NOTE — IP AVS SNAPSHOT
` [place] [address] ONCOLOGY: [phone]            Medication Administration Report for Bean Croft as of 08/14/18 1251   Legend:    Given Hold Not Given Due Canceled Entry Other Actions    Time Time (Time) Time  Time-Action       Inactive    Active    Linked        Medications 08/08/18 08/09/18 08/10/18 08/11/18 08/12/18 08/13/18 08/14/18    acetaminophen (TYLENOL) Suppository 650 mg  Dose: 650 mg  Freq: EVERY 6 HOURS PRN Route: RE  PRN Reasons: mild pain,fever  PRN Comment: temperature over 100  F   Start: 08/13/18 1110   Admin Instructions: Maximum acetaminophen dose from all sources = 75 mg/kg/day not to exceed 4 grams/day.    Admin. Amount: 1 suppository (1 × 650 mg suppository)  Dispense Loc:  ADS 88B               acetaminophen (TYLENOL) tablet 650 mg  Dose: 650 mg  Freq: EVERY 6 HOURS PRN Route: PO  PRN Reasons: mild pain,fever  PRN Comment: temperature over 100  F   Start: 08/13/18 1110   Admin Instructions: Maximum acetaminophen dose from all sources = 75 mg/kg/day not to exceed 4 grams/day.    Admin. Amount: 2 tablet (2 × 325 mg tablet)  Dispense Loc:  ADS 88B               atropine 1 % ophthalmic solution 1-2 drop  Dose: 1-2 drop  Freq: EVERY 1 HOUR PRN Route: SL  PRN Reason: other  PRN Comment: secretions  Start: 08/13/18 1110   Admin. Amount: 1-2 drop  Last Admin: 08/14/18 1210  Dispense Loc:  ADS 88B  Volume: 2 mL           1210 (1 drop)-Given           glycopyrrolate (ROBINUL) injection 0.2-0.4 mg  Dose: 0.2-0.4 mg  Freq: EVERY 4 HOURS PRN Route: IV  PRN Reason: other  PRN Comment: secretions  Start: 08/13/18 1110   Admin Instructions: For ordered doses up to 0.2 mg, give IV Push undiluted over 1-2 minutes.    Admin. Amount: 0.2-0.4 mg = 1-2 mL Conc: 0.2 mg/mL  Last Admin: 08/14/18 1210  Dispense Loc:  ADS 88B  Infused Over: 1-2 Minutes  Volume: 2 mL   Current Line: Peripheral IV 08/10/18 Right Lower forearm         1243 (0.2 mg)-Given       1658 (0.4 mg)-Given         1210 (0.2 mg)-Given           haloperidol (HALDOL) 2 MG/ML (HIGH CONC) solution 1-2 mg  Dose: 1-2 mg  Freq: EVERY 6 HOURS PRN Route: PO  PRN Reason: agitation  PRN Comment: delirium, hallucinations, restlessness  Start: 08/13/18 1112   Admin. Amount: 1-2 mg = 0.5-1 mL Conc: 2 mg/mL  Dispense Loc:  Main Pharmacy  Volume: 1 mL               HYDROmorphone (DILAUDID) (HIGH CONC) oral solution 4 mg  Dose: 4 mg  Freq: EVERY 2 HOURS PRN Route: SL  PRN Reason: moderate to severe pain  PRN Comment: SOB, dyspnea, distress  Start: 08/13/18 1111   Admin Instructions: Injectable use ORALLY. CAUTION: Special high concentration formulation. Verify dose and volume before administration    Admin. Amount: 4 mg = 0.4 mL Conc: 10 mg/mL  Last Admin: 08/14/18 1246  Dispense Loc: [facility] 88B  Volume: 1 mL          1314 (4 mg)-Given       1538 (4 mg)-Given        0433 (4 mg)-Given       0853 (4 mg)-Given       1246 (4 mg)-Given           LORazepam (ATIVAN) 1 mg/0.5 mL (HIGH CONC) solution 2 mg  Dose: 2 mg  Freq: EVERY 3 HOURS PRN Route: PO  PRN Reason: anxiety  Start: 08/13/18 1419   Admin. Amount: 2 mg = 1 mL Conc: 2 mg/mL  Last Admin: 08/14/18 0526  Dispense Loc: [facility] 88 TOWER  Volume: 1 mL          1653 (2 mg)-Given        0526 (2 mg)-Given           LUBRIDERM lotion LOTN  Freq: 3 TIMES DAILY PRN Route: Top  PRN Reason: dry skin  Start: 08/13/18 1115   Dispense Loc:  Main Pharmacy  Volume: 177 mL               Reason ACE/ARB/ARNI order not selected  Freq: DOES NOT GO TO MAR Route: OTHER  PRN Reason: other  Start: 08/14/18 1039   Admin Instructions: Comfort care / hospice    Order specific questions:  Reason not prescribed: documented in admin instructions     Dispense Loc:  Main Pharmacy  POC: Discharge-NON Med               reason aspirin not prescribed (intentional)  Freq: CONTINUOUS PRN Route: OTHER  PRN Reason: other  Start: 08/14/18 1039   Admin Instructions: Comfort care / hospice    Order specific questions:  Reason not  prescribed: documented in admin instructions     Dispense Loc: [facility]  POC: Discharge-NON Med               Reason beta blocker order not selected  Freq: DOES NOT GO TO MAR Route: XX  PRN Reason: other  Start: 18 1039   Admin Instructions: Comfort care / hospice    Order specific questions:  Reason not prescribed: documented in admin instructions     Dispense Loc:  Main Pharmacy  POC: Discharge-NON Med               Reason statin medication order not selected  Freq: DOES NOT GO TO MAR Route: OTHER  PRN Reason: other  Start: 18 1039   Admin Instructions: Comfort care / hospice    Order specific questions:  Reason not prescribed: documented in admin instructions     Dispense Loc: [facility]  POC: Discharge-NON Med               senna-docusate (SENOKOT-S;PERICOLACE) 8.6-50 MG per tablet 1 tablet  Dose: 1 tablet  Freq: 2 TIMES DAILY PRN Route: PO  PRN Reason: constipation  Start: 18 1113   Admin. Amount: 1 tablet  Dispense Loc:  ADS 88B              Future Medications  Medications 08/08/18 08/09/18 08/10/18 08/11/18 08/12/18 08/13/18 08/14/18       bisacodyl (DULCOLAX) Suppository 10 mg  Dose: 10 mg  Freq: ONCE PRN Route: RE  PRN Reason: other  PRN Comment: for no bowel movement for 72 hours  Start: 18 0000   Admin Instructions: Give only after rectal check for impacted stool.    Admin. Amount: 1 suppository (1 × 10 mg suppository)  Dispense Loc:  ADS 88B  Administrations Remainin              Discontinued Medications  Medications 08/08/18 08/09/18 08/10/18 08/11/18 08/12/18 08/13/18 08/14/18         Freq: SEE ADMIN INSTRUCTIONS Route: XX  Start: 08/10/18 1951   End: 18   Admin Instructions: Do not give any medication that may affect blood pressure or volume before dialysis.   The following medications may be removed by dialysis and should be given after dialysis: cefepime, vancomycin    Dispense Loc: [facility]          1110-Med Discontinued           Dose: 10 mg  Freq: DAILY PRN Route: RE  PRN Reason: constipation  Start: 08/10/18 1846   End: 08/13/18 1114   Admin Instructions: Hold for loose stools.  This is the third step of a three step constipation treatment.    Admin. Amount: 1 suppository (1 × 10 mg suppository)  Dispense Loc:  ADS 88B          1114-Med Discontinued          Dose: 1 g  Freq: EVERY 24 HOURS Route: IV  Indications of Use: SEPSIS  Start: 08/11/18 1700   End: 08/12/18 0751   Admin Instructions: Dose adjusted per renal dosing policy.  Estimated CrCl = Hemodialysis.      Admin. Amount: 1 g  Last Admin: 08/11/18 1653  Dispense Loc:  [facility]        1653 (1 g)-New Bag        0751-Med Discontinued           Dose: 15 mL  Freq: 2 TIMES DAILY Route: MT  Start: 08/10/18 2100   End: 08/12/18 0745   Admin. Amount: 15 mL  Last Admin: 08/11/18 2011  Dispense Loc:  [facility]  Volume: 118 mL        (0516)-Not Given       0838 (15 mL)-Given       2011 (15 mL)-Given        0745-Med Discontinued           Dose: 50 mcg  Freq: EVERY 1 HOUR PRN Route: IV  PRN Reason: moderate to severe pain  Start: 08/10/18 1847   End: 08/13/18 1110   Admin Instructions: For ordered doses up to 100 mcg give IV Push undiluted over a minimum of 3-5 minutes.    Admin. Amount: 50 mcg = 1 mL Conc: 50 mcg/mL  Last Admin: 08/13/18 0107  Dispense Loc:  ADS 88B  Volume: 2 mL   Current Line: Peripheral IV 08/10/18 Right Lower forearm       1214 (50 mcg)-Given       1848 (50 mcg)-Given       2023 (50 mcg)-Given       2238 (50 mcg)-Given        0043 (50 mcg)-Given       0518 (50 mcg)-Given       0618 (50 mcg)-Given       0813 (50 mcg)-Given       0918 (50 mcg)-Given       1107 (50 mcg)-Given       1250 (50 mcg)-Given       1354 (50 mcg)-Given       1529 (50 mcg)-Given       1659 (50 mcg)-Given       1824 (50 mcg)-Given       1934 (50 mcg)-Given       2031 (50 mcg)-Given       2313 (50 mcg)-Given        0107 (50 mcg)-Given       1110-Med Discontinued          Dose: 0.2 mg  Freq: ONCE  Route: IV  Start: 18 0345   End: 18   Admin Instructions: Premed-Give 60 minutes prior to removal of endotracheal tube.  For ordered doses up to 0.2 mg, give IV Push undiluted over 1-2 minutes.    Admin. Amount: 0.2 mg = 1 mL Conc: 0.2 mg/mL  Dispense Loc:  ADS 88B  Infused Over: 1-2 Minutes  Administrations Remainin  Volume: 2 mL         (619)-Not Given [C]        1110-Med Discontinued          Dose: 2 mg  Freq: EVERY 3 HOURS PRN Route: PO  PRN Reason: anxiety  Start: 18 111   End: 18   Admin. Amount: 2 mg = 1 mL Conc: 2 mg/mL  Dispense Loc:  Main Pharmacy  Volume: 1 mL          1420-Med Discontinued          Dose: 2 mg  Freq: EVERY 5 MIN PRN Route: IV  PRN Reasons: anxiety,muscle spasms,nausea,agitation  Start: 18 0753   End: 18   Admin Instructions: For IV PUSH: Dilute with equal volume of NS. For ordered doses up to 4 mg give IV Push. Administer each 2mg over 1-5 minutes.    Admin. Amount: 2 mg = 1 mL Conc: 2 mg/mL  Last Admin: 18 07  Dispense Loc:  ADS 88B  Volume: 1 mL         0933 (2 mg)-Given       203 (2 mg)-Given        0738 (2 mg)-Given       1110-Med Discontinued          Freq: 3 TIMES DAILY Route: Top  Start: 18 1600   End: 18   Last Admin: 18 152  Dispense Loc:  Main Pharmacy  Volume: 177 mL         1529 (1 drop)-Given [C]       ()-Not Given        (917)-Not Given       1110-Med Discontinued          Dose: 0.1-0.4 mg  Freq: EVERY 2 MIN PRN Route: IV  PRN Reason: opioid reversal  Start: 08/10/18 1843   End: 18   Admin Instructions: For respiratory rate LESS than or EQUAL to 8.  Partial reversal dose:  0.1 mg titrated q 2 minutes for Analgesia Side Effects Monitoring Sedation Level of 3 (frequently drowsy, arousable, drifts to sleep during conversation).Full reversal dose:  0.4 mg bolus for Analgesia Side Effects Monitoring Sedation Level of 4 (somnolent, minimal or no response to  stimulation).  For ordered doses up to 2mg give IVP. Give each 0.4mg over 15 seconds in emergency situations. For non-emergent situations further dilute in 9mL of NS to facilitate titration of response.    Admin. Amount: 0.1-0.4 mg = 0.25-1 mL Conc: 0.4 mg/mL  Dispense Loc:  ADS 88B  Volume: 1 mL          1110-Med Discontinued          Rate: 1.8-24 mL/hr Dose: 0.03-0.4 mcg/kg/min  Weight Dosing Info: 64 kg (Dosing Weight)  Freq: CONTINUOUS Route: IV  Start: 08/10/18 1945   End: 08/12/18 0751   Admin Instructions: For range orders: start at lowest dose ordered. Titrate by 0.03 to 0.1 mcg/kg/min every 5 minutes to keep MAP greater than 65 mmHg.  Notify prescriber if higher doses are required to achieve blood pressure goals.  Protect from light. Vesicant.    Admin. Amount: 1.92-25.6 mcg/min  Dispense Loc:  Main Pharmacy  Volume: 250 mL        (0100)-Not Given        0751-Med Discontinued           Dose: 40 mg  Freq: DAILY Route: IV  Start: 08/10/18 1900   End: 08/12/18 0751   Admin Instructions: Irritant. For ordered doses up to 40 mg, give IV Push over 2 minutes when reconstituted with 10mL NS.    Admin. Amount: 40 mg  Last Admin: 08/11/18 0822  Dispense Loc: [facility]  Volume: 10 mL       2236 (40 mg)-Given        0822 (40 mg)-Given        0751-Med Discontinued           Rate: 1.9-28.8 mL/hr Dose: 5-75 mcg/kg/min  Weight Dosing Info: 64 kg (Dosing Weight)  Freq: CONTINUOUS Route: IV  Last Dose: Stopped (08/11/18 2157)  Start: 08/10/18 1900   End: 08/12/18 0751   Admin Instructions: For sedation of mechanically ventilated patients.  For range orders: start at lowest dose ordered. Titrate by 5-10 mcg/kg/min every 5 minutes to achieve the defined goal sedation score. If ordered, consider using bolus doses of propofol for acute agitation before titrating infusion.    Order specific questions:  Population for use? Adult Sedation  Goal Yin Agitation Sedation Scale (RASS) Score Goal Range -4 Deep Sedation to -5  Unarousable     Admin. Amount: 320-4,800 mcg/min  Last Admin: 08/11/18 1216  Dispense Loc: [facility]  Volume: 100 mL       1903 (15 mcg/kg/min)-Rate/Dose Change        0810 (25 mcg/kg/min)-Rate/Dose Verify       1115 (20 mcg/kg/min)-Rate/Dose Change       1120 (20 mcg/kg/min)-New Bag       1216 (25 mcg/kg/min)-Rate/Dose Change       2157-Stopped        0751-Med Discontinued           Dose: 1 tablet  Freq: 2 TIMES DAILY Route: PO  Start: 08/13/18 1115   End: 08/13/18 1114   Admin. Amount: 1 tablet  Dispense Loc:  ADS 88B          1114-Med Discontinued          Rate: 10 mL/hr   Freq: CONTINUOUS Route: IV  Last Dose: Stopped (08/11/18 2351)  Start: 08/11/18 0000   End: 08/13/18 1110   Last Admin: 08/11/18 1655  Dispense Loc: [facility] Floor Stock  Volume: 1,000 mL        0500 ( )-New Bag       0600 ( )-New Bag       0811 ( )-Rate/Dose Verify       1655 ( )-New Bag       2351-Stopped                1110-Med Discontinued          Rate: 10 mL/hr   Freq: CONTINUOUS Route: IV  Last Dose: Stopped (08/11/18 2351)  Start: 08/11/18 0000   End: 08/12/18 0751   Last Admin: 08/11/18 0812  Dispense Loc: [facility] Floor Stock  Volume: 1,000 mL        0812 ( )-Rate/Dose Verify       2351-Stopped        0751-Med Discontinued           Dose: 1 each  Freq: SEE ADMIN INSTRUCTIONS Route: XX  Indications of Use: SEPSIS  Start: 08/10/18 1959   End: 08/12/18 0751   Admin Instructions: This order is meant to notify providers that this patient is receiving intermittent doses of vancomycin. Do NOT chart on this order.    Admin. Amount: 1 each  Dispense Loc:  Main Pharmacy         0751-Med Discontinued           Rate: 1.2 mL/hr Dose: 1.2 Units/hr  Freq: CONTINUOUS Route: IV  Last Dose: Stopped (08/11/18 2251)  Start: 08/11/18 0630   End: 08/12/18 0751   Admin Instructions: For range orders: start at lowest dose ordered. IF the prescribed dosage allows titration, titrate by 0.1 to 0.5 units/hour every 20 minutes to keep MAP greater than 65  mmHg.  Vesicant.    Last Admin: 08/11/18 0812  Dispense Loc:  Main Pharmacy  Volume: 40 mL   Mixture Administration Information:   Medication Type Amount   vasopressin 20 UNIT/ML SOLN Medications 40 Units   D5W 5 % SOLN QS Base 38 mL                0630 (1.2 Units/hr)-New Bag       0812 (1.2 Units/hr)-Rate/Dose Verify       2251-Stopped        0751-Med Discontinued      Medications 08/08/18 08/09/18 08/10/18 08/11/18 08/12/18 08/13/18 08/14/18

## 2018-08-10 NOTE — PROGRESS NOTES
Potassium   Date Value Ref Range Status   08/10/2018 4.2 3.4 - 5.3 mmol/L Final   ]  Lab Results   Component Value Date    HGB 6.1 08/10/2018     Weight: 64 kg (141 lb 1.5 oz)  POST WT: -0 kg  DIALYSIS PROCEDURE NOTE  Hepatitis status of previous patient on machine log was checked and ok to use with this patient hepatitis status.  Patient dialyzed for 3 hrs on a K3 bath with a  net fluid removal of 0 L.    A BFR of 400 ml/min was obtained via left upper-arm AV fisutla.    Patient was seen by Dr. Barrera during treatment.    Total heparin received during treatment:: 0 units.      Meds given: Epogen, 1 unit PRBC  Complications: Pt became hypotensive within 30 minutes of starting treatment, with SBP 70's- gave 100ml NS bolus, paged MD. Discussed UF goal with MD, agreed to net 0L off with giving 1 unit PRBC.  BP stable with 's for the rest of treatment.     Procedure and ESRD teaching done and questions answered.  See flowsheet in EPIC for further details and post assessment.  Machine water alarm in place and functioning.  HEPATITIS B SURFACE ANTIGEN: negative Date: 7/24/18   HEPATITIS B SURFACE ANTIBODY: unknown  CHLORINE AND CHLORAMINES CHECK on WATER SYSTEM EVERY 4 hours.   PT dialyzed at the bedside in ICU room 370.   Catheter Access: Aseptic prep done for both on/off.  Report received from: NEFTALI Hyatt RN  Report given to: NEFTALI Beebe RN  Outpatient Dialysis at [facility] & Brendan Cormier RN  [facility]

## 2018-08-10 NOTE — PROGRESS NOTES
Intubated patient arrived with EMS. Re intubated at stabilization room one at 15:42 with size ET tube 7.0 mm and secured 22 Cm at lip. Tube placement confirmed with end tidal CO2 color change. BS equal, coarse and bilateral. Pt placed on mechanical ventilator with setting RR 14, Vt 500, FiO2 40% and PEEP 10 CmH2O. SpO2 high 90`s. Suctioned moderate amount of thick tan secretions. Will continue to follow.

## 2018-08-10 NOTE — PHARMACY-ADMISSION MEDICATION HISTORY
Admission medication history interview status for the 8/10/2018  admission is complete. See EPIC admission navigator for prior to admission medications     Medication history source reliability:Poor    Actions taken by pharmacist (provider contacted, etc):Unable to interview patient, unresponsive and no family. Was able to call pharmacy to validate chronic therapy     Additional medication history information not noted on PTA med list :Unable to validate aspirin 81mg o docusate capsule. [facility] pharmacist indicated that patient likely could have up titrated aricept to 10mg day, but unconfirmed on current dose.    Medication reconciliation/reorder completed by provider prior to medication history? No    Time spent in this activity: 15 min    Prior to Admission medications    Medication Sig Last Dose Taking? Auth Provider   allopurinol (ZYLOPRIM) 100 MG tablet Take 1 tablet (100 mg) by mouth daily  Yes Shaila Silverman MD   amLODIPine (NORVASC) 10 MG tablet Take 1 tablet (10 mg) by mouth daily  Yes Shaila Silverman MD   donepezil (ARICEPT) 5 MG tablet Take 1 tablet by mouth daily  Yes Stuart Preston MD   flurbiprofen (OCUFEN) 0.03 % ophthalmic solution Place 1 drop Into the left eye every 8 hours  Yes Unknown, Entered By History   furosemide (LASIX) 80 MG tablet Take 1 tablet (80 mg) by mouth daily On non-dialysis days  Yes Stuart Peter PA-C   hydrALAZINE (APRESOLINE) 100 MG TABS tablet TAKE 1 TABLET(100 MG) BY MOUTH THREE TIMES DAILY  Yes Shaila Silverman MD   metoprolol (TOPROL-XL) 50 MG 24 hr tablet Take 1 tablet (50 mg) by mouth daily  Yes Yaw Baker MD   Multiple Vitamins-Minerals (DIALYVITE 800/ULTRA D) TABS Take 1 tablet by mouth daily  Yes Reported, Patient   omeprazole (PRILOSEC) 20 MG CR capsule TAKE 1 CAPSULE(20 MG) BY MOUTH DAILY  Yes Shaila Silverman MD   simvastatin (ZOCOR) 40 MG tablet TAKE 1 TABLET(40 MG) BY MOUTH AT BEDTIME  Yes Shaila Silverman MD    aspirin 81 MG EC tablet Take 81 mg by mouth daily Unknown at Unknown time  Unknown, Entered By History   docusate sodium (COLACE) 100 MG capsule Take 100 mg by mouth 2 times daily Unknown at Unknown time  Reported, Patient

## 2018-08-10 NOTE — ED NOTES
DATE:  8/10/2018   TIME OF RECEIPT FROM LAB: 8574  LAB TEST:  hbg  LAB VALUE:  6.1  RESULTS GIVEN WITH READ-BACK TO (PROVIDER):   [place]  TIME LAB VALUE REPORTED TO PROVIDER:   8357

## 2018-08-11 NOTE — PROGRESS NOTES
Critical Care Progress Note     Mr. Moncada has had escalating troponin elevations since admission.  His initial troponin at 1540 on 8/10 was elevated to 5.9 and was trending up to 10.9 at 1900 on 8/10. This morning at 0300 it increased to 28.1.  A cardiac ECHO was completed, demonstrating apical hypokinesis. Arterial blood gas was consistent with metabolic alkalosis at 7.45/41/118/28. His initial Hgb was 6.1 gm/dL.  Hgb recheck following trasnfusion was 8.8 gm/dL at 0300 and Hgb was 8.6 gm/dL at 0500.    A/P:     1.) GI bleed Hgb now stable; will consult GI Medicine.    2) Apical hypokinesis and possible stress cardiomyopathy (Takasubu) vs. ACS.  Cardiology consultation was obtained and vasopressin drip recommended to increase outflow resistance and potentially decrease left ventricular mechanical outflow tract obstruction.     Bandar Gibbs M.D., Ph.D.

## 2018-08-11 NOTE — PROCEDURES
"Procedure/Surgery Information   [facility]    Bedside Procedure Note  Date of Service (when I performed the procedure): 08/10/2018    Bean Croft is a 78 year old male patient.  1. CHF exacerbation (H)      Past Medical History:   Diagnosis Date     Anemia     unspecified     Arthritis     generalized     Carotid artery stenosis      CHF (congestive heart failure) (H)      Chronic kidney disease     Stage IV     Coronary artery disease      Dementia      Diabetes mellitus (H)     Non-insulin Dependent     Diabetic retinopathy (H)      Diverticulitis 7/2016     DM (diabetes mellitus screen)      Enlarged prostate      Gastro-oesophageal reflux disease      Gout      Hearing loss      History of left-sided carotid endarterectomy 4/19/16     Hyperlipidaemia      Hypertension      Low back pain      Peripheral vascular disease (H)      Pulmonary nodule      Seborrheic keratosis      Sleep apnea     stated by patient - not official diagnosis     Temp: 96.6  F (35.9  C) Temp src: Temporal BP: (!) 76/51 Pulse: 97 Heart Rate: 96 Resp: 18 SpO2: 100 % O2 Device: Mechanical Ventilator      Central line  Date/Time: 8/10/2018 7:44 PM  Performed by: RAYSA WALLACE  Authorized by: DAVY LOBATO   Consent: The procedure was performed in an emergent situation. Verbal consent not obtained. Written consent not obtained.  Required items: required blood products, implants, devices, and special equipment available  Patient identity confirmed: anonymous protocol, patient vented/unresponsive  Time out: Immediately prior to procedure a \"time out\" was called to verify the correct patient, procedure, equipment, support staff and site/side marked as required.  Indications: vascular access  Anesthesia: local infiltration    Anesthesia:  Local Anesthetic: lidocaine 1% without epinephrine  Anesthetic total: 0.1 mL    Sedation:  Patient sedated: no  Preparation: skin prepped with betadine  Location details: right " internal jugular  Patient position: flat  Catheter type: triple lumen  Pre-procedure: landmarks identified  Ultrasound guidance: yes  Number of attempts: 1  Successful placement: yes  Post-procedure: line sutured  Assessment: blood return through all parts  Patient tolerance: Patient tolerated the procedure well with no immediate complications        Anat Sosa

## 2018-08-11 NOTE — PHARMACY-VANCOMYCIN DOSING SERVICE
Pharmacy Vancomycin Note  Date of Service 2018  Patient's  1940   78 year old, male    Indication: Sepsis  Goal Trough Level: 15-20 mg/L  Day of Therapy: 2  Current Vancomycin regimen:   1500mg x 1 then intermittent based on levels    Current estimated CrCl = Estimated Creatinine Clearance: 14.7 mL/min (based on Cr of 3.7).    Creatinine for last 3 days  8/10/2018:  3:39 PM Creatinine 6.88 mg/dL;  6:55 PM Creatinine 3.28 mg/dL  2018:  3:00 AM Creatinine 3.70 mg/dL    Recent Vancomycin Levels (past 3 days)  2018:  5:00 AM Vancomycin Level 11.1 mg/L    Vancomycin IV Administrations (past 72 hours)                   vancomycin (VANCOCIN) 1,500 mg in sodium chloride 0.9 % 250 mL intermittent infusion (mg) 1,500 mg New Bag 08/10/18 170                Nephrotoxins and other renal medications (Future)    Start     Dose/Rate Route Frequency Ordered Stop    18 0800  vancomycin (VANCOCIN) 1,500 mg in sodium chloride 0.9 % 250 mL intermittent infusion      1,500 mg  over 90 Minutes Intravenous ONCE 18 0745      18 0630  vasopressin (VASOSTRICT) 40 Units in D5W 40 mL infusion      1.2 Units/hr  1.2 mL/hr  Intravenous CONTINUOUS 18 0619      08/10/18 1959  vancomycin place tadeo - receiving intermittent dosing      1 each Does not apply SEE ADMIN INSTRUCTIONS 08/10/18 1959      08/10/18 1945  norepinephrine (LEVOPHED) 16 mg in D5W 250 mL infusion      0.03-0.4 mcg/kg/min × 64 kg (Dosing Weight)  1.8-24 mL/hr  Intravenous CONTINUOUS 08/10/18 194               Contrast Orders - past 72 hours (72h ago through future)    Start     Dose/Rate Route Frequency Ordered Stop    18 0545  perflutren diluted 1mL to 2mL with saline (OPTISON) diluted injection 3 mL      3 mL Intravenous ONCE 18 0544 18 0544          Interpretation of levels and current regimen:  Trough level is  Subtherapeutic    Has serum creatinine changed > 50% in last 72 hours: No    Urine output:   Some UO    Renal Function: ESRD on Dialysis    Plan:  1.  Give 1500mg x 1 dose now  2.  Pharmacy will check trough levels as appropriate in AM 8-12.    3. Serum creatinine levels will be ordered a minimum of twice weekly.      Monica Dalton        .

## 2018-08-11 NOTE — CONSULTS
Consult Date:  08/11/2018      GASTROENTEROLOGY CONSULTATION      CHIEF COMPLAINT:  Rectal bleeding.      IDENTIFICATION:  The patient is a 78-year-old man we were asked to see by his admitting physician, Dr. Yeison Hill, critical care consultant in the ICU for further evaluation of dark stools, OB positive, and a hemoglobin of 6.1.      HISTORY OF PRESENT ILLNESS:  The patient is a 78-year-old gentleman who has a significant history for renal disease on hemodialysis, has a history of coronary artery disease with an MI last year.  He has a history of CHF, type 2 diabetes mellitus, gastroesophageal reflux, hypertension and peripheral vascular disease who was admitted when he experienced acute shortness of breath.  The wife reports that he was at home when all of a sudden, they got back from the doctor's office and he went into the bathroom and developed acute shortness of breath and weakness and the ambulance had to be called.  He was brought to the hospital and intubated.  He was also hypotensive and was noticed to have a hemoglobin of 6.1 with guaiac positive stools.      At this time, the patient remains intubated, somewhat sedated on IV pressors and he has been transfused.  He is not having saumya melena nor significant rectal bleeding.      REVIEW OF SYSTEMS:  Really unobtainable from the patient.      PAST MEDICAL HISTORY:  Significant for:   1.  Anemia.  The patient is on dialysis and runs with a hemoglobin of about 10 normally.   2.  Arthritis.   3.  Coronary artery stenosis, status post carotid endarterectomy.   4.  CHF.   5.  Chronic kidney disease, on hemodialysis.   6.  Diabetes mellitus.   7.  Diabetic retinopathy.   8.  History of diverticulitis.   9.  Gastroesophageal reflux disease.   10.  Hypotension.    11.  Peripheral vascular disease.   12.  Pulmonary nodule.     13.  History of an MI.      PAST SURGICAL HISTORY:  Includes coronary angiogram in 1996, carotid endarterectomy, tonsillectomy,  appendectomy and creation of AV fistula.      SOCIAL HISTORY:  The patient smokes about half pack a day.  He quit in 2016.  Denies alcohol.      FAMILY HISTORY:  Significant for coronary artery disease in his father and his sister.      PHYSICAL EXAMINATION:   GENERAL:  He is a well-nourished white gentleman, intubated and appears in no apparent distress.   VITAL SIGNS:  His temperature is 99.7, heart rate 66.  His blood pressure is 109/61.   HEENT:  Head is atraumatic, normocephalic.   SKIN:  Pale without jaundice or rash.  Sclerae appear nonicteric.   HEART:  S1, S2.  He does have a systolic murmur.   LUNGS:  Clear to auscultation.  There was some upper airway noise.  It was difficult for me to auscultate his lungs in the supine position.   ABDOMEN:  Soft, normal bowel sounds, nontender, nondistended.   EXTREMITIES:  Right and left lower extremity had trace pedal edema.   NEUROLOGIC:  He is sedated and intubated.   PSYCHIATRIC:  Unable to obtain.      LABORATORY DATA:  His sodium is normal.  Electrolytes normal.  BUN 42, creatinine 3.7, his troponins are 28 and 32, glucose 144, white blood cell count 15.1, hemoglobin 8.8, platelets are normal.      TAG ASSESSMENT AND PLAN:  Gastrointestinal bleed.  Patient has a possible GI bleed, accounting for his hemoglobin drop from 10 to 6.1.  We are discussing with his wife the appropriateness of an upper endoscopy.  There is some discussion of whether the patient wanted to be DNR/DNI with no other interventions. At this time we will hold off on endoscopy until this can be further decided in the family.  In the meantime, he will be kept n.p.o. and on a PPI.  We will continue to follow.      If the upper endoscopy is negative, he would need a repeat colonoscopy. Last colonoscopy appears to be in 2006 from what I can find.      Thank you for asking us to participate in his care.         TATO BURKS MD             D: 08/11/2018   T: 08/11/2018   MT: [id number]      Name:      LATOYA EDMOND   MRN:      [id number]        Account:       [id number]   :      1940           Consult Date:  2018      Document: [id number]

## 2018-08-11 NOTE — CONSULTS
[facility]    Cardiology Consultation     Date of Admission:  8/10/2018    Assessment & Plan   Bean Croft is a 78 year old male who was admitted on 8/10/2018. I was asked to see the patient for evaluation of cardiomyopathy.    I reviewed the echocardiogram.  This shows the presence of significant apical and mid ventricular regional wall motion abnormalities with relative preservation at the base.  Although not completely classic in appearance overall, the echocardiogram could suggest a stress cardiomyopathy.  With his history of left anterior descending stent, one cannot rule out an acute coronary syndrome, though given the severe anemia and hypotension, stress cardiomyopathy is possible.    Acute cardiomyopathy  History of coronary disease s/p PCI to LAD in 1996  HTN  T2DM  Advanced renal disease, on hemodialysis  Peripheral artery disease, s/p L CEA 2016  Given the absence of inducible ischemia on most recent stress test in 2014, which was noted dobutamine stress echocardiogram, there is a possibility that he does not have any significant residual coronary disease.  Nevertheless, given the regional wall motion abnormalities, consideration for acute coronary syndrome is at least intermediate, and therefore warrants further investigation.  Reassuringly, he has not had a significant rise in his troponin although they are quite elevated.  This can be seen in the setting of hemodialysis.  Additionally, he does have  a shock state, which is not fully differentiated at this point, that could lead to a stress cardiomyopathy in itself.    The patient does warrant consideration for coronary angiogram though given the presence of an acute GI bleed, presumably, evaluating the upper GI tract for bleeding would be advisable prior to proceeding with coronary angiogram with eye towards intervention given the need for Plavix therapy.  Since her contraindications to any regulation at this time, we will  await upper endoscopy prior to treatment.    In the meantime, I would avoid excessive inotropic stimulation that would be associated with her myocardial oxygen demand.  If this is a stress-induced code, recommended pure vasoconstrictive therapy to prevent the development of a dynamic outflow obstruction.    Recommendations:  1.  Consider upper endoscopy to evaluate source of bleeding given possible need for intervention  2.  Continue to trend troponins.  3.  Hold antiplatelet therapy and anticoagulation pending upper endoscopy.  4.  Continue to treat shock state as you are    ADDENDUM: Decision made to move towards comfort cares.  Would not consider catheterization.  We will sign off.    Active Problems:    Acute respiratory failure (H)    Omer Holden MD  Text Page     Code Status    Full Code    Reason for Consult   Reason for consult: Evaluation of new onset cardiomyopathy    Primary Care Physician   Shaila Silverman    Chief Complaint   Shock    Unable to obtain a history from the patient due to critical condition, intubation and sedation    History of Present Illness   Bean Croft is a 78 year old male who presents with shortness of breath.  Unfortunately, history is obtained solely through the chart as the patient is intubated.  Reportedly has been experiencing weakness and fatigue and had a recent decrease in her his diuretic dose.  He was intubated approximately 3 PM yesterday.  He was hypotensive to the 70s-80s.  He had been started on norepinephrine overnight.  He is covered with broad-spectrum antibiotic therapy for shock.  Overnight, echocardiogram was obtained given elevated troponin level.  It was noted to be 28 yesterday from 10 day before.  Continues to rise to 33 today.  He was Hemoccult positive, and hemoglobin was as low as 6.1 from a baseline of 10.6.  He was transfused.  Gastroenterology was consulted.    Patient has a history of coronary disease.  He had an anterior myocardial  infarction [place] in 1996, which is treated with streptokinase.  He then underwent PCI to the LAD in 1996.  He has a history of peripheral vascular disease including both femoral and carotid disease status post left carotid endarterectomy in February 2016.  His most recent stress test was a dobutamine echocardiogram in 2014 which did not reveal any inducible ischemia.  Medical history is otherwise relevant for end-stage renal disease on hemodialysis.          Past Medical History   I have reviewed this patient's medical history and updated it with pertinent information if needed.   Past Medical History:   Diagnosis Date     Anemia     unspecified     Arthritis     generalized     Carotid artery stenosis      CHF (congestive heart failure) (H)      Chronic kidney disease     Stage IV     Coronary artery disease      Dementia      Diabetes mellitus (H)     Non-insulin Dependent     Diabetic retinopathy (H)      Diverticulitis 7/2016     DM (diabetes mellitus screen)      Enlarged prostate      Gastro-oesophageal reflux disease      Gout      Hearing loss      History of left-sided carotid endarterectomy 4/19/16     Hyperlipidaemia      Hypertension      Low back pain      Peripheral vascular disease (H)      Pulmonary nodule      Seborrheic keratosis      Sleep apnea     stated by patient - not official diagnosis       Past Surgical History   I have reviewed this patient's surgical history and updated it with pertinent information if needed.  Past Surgical History:   Procedure Laterality Date     CORONARY ANGIOGRAPHY ADULT ORDER  1996    ???CLAIRE to mid LAD, initial obstruction 95%     CREATE FISTULA ARTERIOVENOUS UPPER EXTREMITY Left 5/31/2016    Procedure: CREATE FISTULA ARTERIOVENOUS UPPER EXTREMITY;  Surgeon: Batsheva Wright MD;  Location:  OR     ENDARTERECTOMY CAROTID Left 4/19/2016    Procedure: ENDARTERECTOMY CAROTID;  Surgeon: Batsheva Wright MD;  Location:  OR     REPAIR FISTULA  ARTERIOVENOUS UPPER EXTREMITY Left 11/15/2016    Procedure: REPAIR FISTULA ARTERIOVENOUS UPPER EXTREMITY;  Surgeon: Tae Hendrix MD;  Location: [facility] OR     TONSILLECTOMY, ADENOIDECTOMY, COMBINED         Prior to Admission Medications   Prior to Admission Medications   Prescriptions Last Dose Informant Patient Reported? Taking?   Multiple Vitamins-Minerals (DIALYVITE 800/ULTRA D) TABS  Pharmacy Yes Yes   Sig: Take 1 tablet by mouth daily   allopurinol (ZYLOPRIM) 100 MG tablet  Pharmacy No Yes   Sig: Take 1 tablet (100 mg) by mouth daily   amLODIPine (NORVASC) 10 MG tablet  Pharmacy No Yes   Sig: Take 1 tablet (10 mg) by mouth daily   aspirin 81 MG EC tablet Unknown at Unknown time  Yes No   Sig: Take 81 mg by mouth daily   docusate sodium (COLACE) 100 MG capsule Unknown at Unknown time  Yes No   Sig: Take 100 mg by mouth 2 times daily   donepezil (ARICEPT) 5 MG tablet  Pharmacy Yes Yes   Sig: Take 1 tablet by mouth daily   flurbiprofen (OCUFEN) 0.03 % ophthalmic solution  Pharmacy Yes Yes   Sig: Place 1 drop Into the left eye every 8 hours   furosemide (LASIX) 80 MG tablet  Pharmacy No Yes   Sig: Take 1 tablet (80 mg) by mouth daily On non-dialysis days   hydrALAZINE (APRESOLINE) 100 MG TABS tablet  Pharmacy No Yes   Sig: TAKE 1 TABLET(100 MG) BY MOUTH THREE TIMES DAILY   metoprolol (TOPROL-XL) 50 MG 24 hr tablet  Pharmacy No Yes   Sig: Take 1 tablet (50 mg) by mouth daily   omeprazole (PRILOSEC) 20 MG CR capsule  Pharmacy No Yes   Sig: TAKE 1 CAPSULE(20 MG) BY MOUTH DAILY   simvastatin (ZOCOR) 40 MG tablet  Pharmacy No Yes   Sig: TAKE 1 TABLET(40 MG) BY MOUTH AT BEDTIME      Facility-Administered Medications: None     Allergies   Allergies   Allergen Reactions     Ace Inhibitors Cough     Penicillins      Other reaction(s): *Unknown  Pt was given it for rheumatic fever. MD told him to never take it again per pt statement. ? Allergy.       Social History   I have reviewed this patient's social history and  updated it with pertinent information if needed. Bean Croft  reports that he quit smoking about 2 years ago. His smoking use included Cigarettes. He started smoking about 62 years ago. He smoked 0.50 packs per day. He has never used smokeless tobacco. He reports that he does not drink alcohol or use illicit drugs.    Family History   I have reviewed this patient's family history and updated it with pertinent information if needed.   Family History   Problem Relation Age of Onset     C.A.D. Father 52     MI     C.A.D. Sister      Other - See Comments Mother 92       Review of Systems   Review of systems not obtained due to patient factors - critical condition, intubation and sedation    Physical Exam   Temp: 98.1  F (36.7  C) Temp src: Esophageal BP: 101/57 Pulse: 97 Heart Rate: 69 Resp: 17 SpO2: 100 % O2 Device: Mechanical Ventilator    Vital Signs with Ranges  Temp:  [94.1  F (34.5  C)-99.1  F (37.3  C)] 98.1  F (36.7  C)  Pulse:  [93-97] 97  Heart Rate:  [64-98] 69  Resp:  [13-48] 17  BP: ()/(46-67) 101/57  FiO2 (%):  [40 %-100 %] 40 %  SpO2:  [90 %-100 %] 100 %  139 lbs 1.76 oz    Constitutional: No apparent distress.   Eyes: No xanthelasma or conjunctivitis  HEENT: Moist oral mucosa  Respiratory: Intubated  Cardiovascular: Borderline tachycardia, without significant murmur second be appreciated.  Lymph/Hematologic: No purpura or petechiae.  Skin: No stasis dermatitis. No major rashes.  Extremities: Mild lower extremity edema.  Neurologic: Sedated and unable to evaluate.  Psychiatric: Intubated and sedated.      Data   Results for orders placed or performed during the hospital encounter of 08/10/18 (from the past 24 hour(s))   CBC with platelets + differential   Result Value Ref Range    WBC 16.8 (H) 4.0 - 11.0 10e9/L    RBC Count 1.82 (L) 4.4 - 5.9 10e12/L    Hemoglobin 6.1 (LL) 13.3 - 17.7 g/dL    Hematocrit 19.0 (L) 40.0 - 53.0 %     (H) 78 - 100 fl    MCH 33.5 (H) 26.5 - 33.0 pg    MCHC  32.1 31.5 - 36.5 g/dL    RDW 15.1 (H) 10.0 - 15.0 %    Platelet Count 314 150 - 450 10e9/L    Diff Method Automated Method     % Neutrophils 90.1 %    % Lymphocytes 7.2 %    % Monocytes 1.9 %    % Eosinophils 0.1 %    % Basophils 0.1 %    % Immature Granulocytes 0.6 %    Nucleated RBCs 0 0 /100    Absolute Neutrophil 15.1 (H) 1.6 - 8.3 10e9/L    Absolute Lymphocytes 1.2 0.8 - 5.3 10e9/L    Absolute Monocytes 0.3 0.0 - 1.3 10e9/L    Absolute Eosinophils 0.0 0.0 - 0.7 10e9/L    Absolute Basophils 0.0 0.0 - 0.2 10e9/L    Abs Immature Granulocytes 0.1 0 - 0.4 10e9/L    Absolute Nucleated RBC 0.0    Comprehensive metabolic panel   Result Value Ref Range    Sodium 138 133 - 144 mmol/L    Potassium 4.2 3.4 - 5.3 mmol/L    Chloride 100 94 - 109 mmol/L    Carbon Dioxide 20 20 - 32 mmol/L    Anion Gap 18 (H) 3 - 14 mmol/L    Glucose 230 (H) 70 - 99 mg/dL    Urea Nitrogen 104 (H) 7 - 30 mg/dL    Creatinine 6.88 (H) 0.66 - 1.25 mg/dL    GFR Estimate 8 (L) >60 mL/min/1.7m2    GFR Estimate If Black 9 (L) >60 mL/min/1.7m2    Calcium 8.0 (L) 8.5 - 10.1 mg/dL    Bilirubin Total 0.4 0.2 - 1.3 mg/dL    Albumin 3.0 (L) 3.4 - 5.0 g/dL    Protein Total 5.9 (L) 6.8 - 8.8 g/dL    Alkaline Phosphatase 58 40 - 150 U/L    ALT 16 0 - 70 U/L    AST 41 0 - 45 U/L   BNP   Result Value Ref Range    N-Terminal Pro BNP Inpatient 94389 (H) 0 - 1800 pg/mL   Troponin I   Result Value Ref Range    Troponin I ES 5.992 (HH) 0.000 - 0.045 ug/L   ABO/Rh type and screen   Result Value Ref Range    Units Ordered 2     ABO O     RH(D) Pos     Antibody Screen Neg     Test Valid Only At [facility]        Specimen Expires 08/13/2018     Crossmatch Red Blood Cells    Blood component   Result Value Ref Range    Unit Number [id number]     Blood Component Type Red Blood Cells Leukocyte Reduced     Division Number 00     Status of Unit Released to care unit     Blood Product Code [id number]     Unit Status ISS    Blood component   Result Value Ref  Range    Unit Number [id number]     Blood Component Type Red Blood Cells Leukocyte Reduced     Division Number 00     Status of Unit Released to care unit     Blood Product Code [id number]     Unit Status ISS    Cortisol   Result Value Ref Range    Cortisol Serum 39.6 (H) 4 - 22 ug/dL   TSH with free T4 reflex   Result Value Ref Range    TSH 0.91 0.40 - 4.00 mU/L   XR Chest Port 1 View    Narrative    XR CHEST PORT 1 VW 8/10/2018 3:48 PM    COMPARISON: 10/27/2016    HISTORY: Respiratory distress, endotracheal tube positioning.      Impression    IMPRESSION: Endotracheal tube tip approximately 4 cm above the amina.  Patchy opacities over both lungs, RIGHT worse than LEFT, edema versus  atypical infection. No pneumothorax seen on either side.    REY PRUITT MD   UA with Microscopic   Result Value Ref Range    Color Urine Yellow     Appearance Urine Slightly Cloudy     Glucose Urine 300 (A) NEG^Negative mg/dL    Bilirubin Urine Negative NEG^Negative    Ketones Urine Negative NEG^Negative mg/dL    Specific Gravity Urine 1.012 1.003 - 1.035    Blood Urine Negative NEG^Negative    pH Urine 5.0 5.0 - 7.0 pH    Protein Albumin Urine 30 (A) NEG^Negative mg/dL    Urobilinogen mg/dL Normal 0.0 - 2.0 mg/dL    Nitrite Urine Negative NEG^Negative    Leukocyte Esterase Urine Negative NEG^Negative    Source Catheterized Urine     WBC Urine <1 0 - 5 /HPF    RBC Urine 1 0 - 2 /HPF    Amorphous Crystals Few (A) NEG^Negative /HPF   Urine Culture   Result Value Ref Range    Specimen Description Catheterized Urine     Special Requests Specimen received in preservative     Culture Micro PENDING    EKG 12 lead   Result Value Ref Range    Interpretation ECG Click View Image link to view waveform and result    Blood culture   Result Value Ref Range    Specimen Description Blood Right Arm     Special Requests Aerobic and anaerobic bottles received     Culture Micro No growth after 7 hours    Blood culture   Result Value Ref Range     Specimen Description Blood Right Hand     Special Requests Aerobic and anaerobic bottles received     Culture Micro No growth after 7 hours    Occult blood stool (ED Lab POCT Only 3-11)   Result Value Ref Range    Occult Blood Positive (A) NEG^Negative   Lactic acid   Result Value Ref Range    Lactic Acid 1.8 0.7 - 2.0 mmol/L   Glucose by meter   Result Value Ref Range    Glucose 199 (H) 70 - 99 mg/dL   Lactic acid whole blood   Result Value Ref Range    Lactic Acid 1.1 0.7 - 2.0 mmol/L   Basic metabolic panel   Result Value Ref Range    Sodium 138 133 - 144 mmol/L    Potassium 3.7 3.4 - 5.3 mmol/L    Chloride 100 94 - 109 mmol/L    Carbon Dioxide 27 20 - 32 mmol/L    Anion Gap 11 3 - 14 mmol/L    Glucose 161 (H) 70 - 99 mg/dL    Urea Nitrogen 53 (H) 7 - 30 mg/dL    Creatinine 3.28 (H) 0.66 - 1.25 mg/dL    GFR Estimate 18 (L) >60 mL/min/1.7m2    GFR Estimate If Black 22 (L) >60 mL/min/1.7m2    Calcium 8.3 (L) 8.5 - 10.1 mg/dL   CBC with platelets   Result Value Ref Range    WBC 26.1 (H) 4.0 - 11.0 10e9/L    RBC Count 2.16 (L) 4.4 - 5.9 10e12/L    Hemoglobin 7.2 (L) 13.3 - 17.7 g/dL    Hematocrit 22.2 (L) 40.0 - 53.0 %     (H) 78 - 100 fl    MCH 33.3 (H) 26.5 - 33.0 pg    MCHC 32.4 31.5 - 36.5 g/dL    RDW 14.8 10.0 - 15.0 %    Platelet Count 380 150 - 450 10e9/L   Lipase   Result Value Ref Range    Lipase 301 73 - 393 U/L   Procalcitonin   Result Value Ref Range    Procalcitonin 0.50 ng/ml   Troponin I   Result Value Ref Range    Troponin I ES 10.963 (HH) 0.000 - 0.045 ug/L   Central line    Narrative    Anat Wallace MD     8/10/2018  7:50 PM  Central line  Date/Time: 8/10/2018 7:44 PM  Performed by: ANAT WALLACE  Authorized by: DAVY LOBATO   Consent: The procedure was performed in an emergent situation. Verbal   consent not obtained. Written consent not obtained.  Required items: required blood products, implants, devices, and special   equipment available  Patient identity confirmed:  "anonymous protocol, patient   vented/unresponsive  Time out: Immediately prior to procedure a \"time out\" was called to verify   the correct patient, procedure, equipment, support staff and site/side   marked as required.  Indications: vascular access  Anesthesia: local infiltration    Anesthesia:  Local Anesthetic: lidocaine 1% without epinephrine  Anesthetic total: 0.1 mL    Sedation:  Patient sedated: no  Preparation: skin prepped with betadine  Location details: right internal jugular  Patient position: flat  Catheter type: triple lumen  Pre-procedure: landmarks identified  Ultrasound guidance: yes  Number of attempts: 1  Successful placement: yes  Post-procedure: line sutured  Assessment: blood return through all parts  Patient tolerance: Patient tolerated the procedure well with no immediate   complications     Blood culture   Result Value Ref Range    Specimen Description Blood     Culture Micro Canceled, Test credited  Duplicate request      Blood culture   Result Value Ref Range    Specimen Description Blood     Culture Micro Canceled, Test credited  Duplicate request      XR Chest Port 1 View    Narrative    CHEST PORTABLE ONE VIEW 8/10/2018 8:07 PM     HISTORY: Check central line.    COMPARISON: Chest x-ray 8/10/2018.      Impression    IMPRESSION:   1. New right neck central line tip at the superior vena cava.  Nasogastric tube is identified. The ET tube tip is difficult to  visualize.  2. Bilateral vascular and interstitial prominence again noted. This  may be mildly improved on the right but is unchanged on the left.    SOBIA RICHARDSON MD   CT Chest Abdomen Pelvis w/o Contrast    Narrative    CT CHEST/ABDOMEN/PELVIS WITHOUT CONTRAST  8/10/2018 10:02 PM    HISTORY: Occult blood positive stool. Patient with congestive heart  failure, ESRD with questionable sepsis and acute respiratory failure.    TECHNIQUE: CT scan obtained of the chest, abdomen, and pelvis without  IV contrast. Radiation dose for this scan " was reduced using automated  exposure control, adjustment of the mA and/or kV according to patient  size, or iterative reconstruction technique.    COMPARISON:  CT abdomen and pelvis 7/23/2016.    FINDINGS:  Chest: The patient is intubated. Nasogastric tube in place. Thoracic  aortic and coronary artery calcifications. Small bilateral pleural  effusions. Bibasilar atelectasis. There are patchy areas of  consolidation suggested at the bilateral lower lobes and posterior  left upper lobe adjacent to the major fissure. Mild emphysematous  changes. Some respiratory motion limits detail assessment of the  lungs. There are a few scattered mildly prominent lymph nodes in the  mediastinum. One of the larger lymph nodes is 2.0 x 1.2 cm distal  right paratracheal location series 2 image 28. There are other  examples.    Abdomen/pelvis: Diffuse vascular calcifications. House catheter  decompresses the bladder. Small gas in the bladder. Prostate is  prominent measuring 4.7 cm. No bowel obstruction. Colonic  diverticulosis. No convincing diverticulitis or other bowel  inflammation. Normal appendix.    Within the limits of unenhanced scanning, the liver, gallbladder,  spleen, and pancreas shows no significance. Nodular thickening is  hypodense involving the left adrenal with internal density measurement  of 1.8 Hounsfield units. This nodule is 1.7 cm series 2 image 63 and  is consistent with an adenoma. It appears stable. Stable minimal  thickening of the right adrenal. Bilateral renal cysts again noted.  One of the largest is at the upper right kidney measuring 8.6 cm,  stable image 59. There are other cyst examples in the kidneys. No  enlarged lymph nodes in the abdomen or pelvis. Bone windows of the  chest, abdomen, and pelvis do not show any convincing destructive bony  lesion.      Impression    IMPRESSION:  1. Bilateral patchy areas of consolidation at the lung bases and  posterior left upper lobe. Correlate with evidence  of multifocal  pneumonia. Lung base opacities may also represent atelectasis.  2. Small bibasilar pleural effusions.  3. A few nonspecific mildly prominent mediastinal lymph nodes.  4. Colonic diverticulosis without diverticulitis. No acute  inflammatory change of the bowel. Please note that this examination  cannot exclude any underlying colonic lesions.  5. Enlarged prostate.    SOIBA RICHARDSON MD   Troponin I   Result Value Ref Range    Troponin I ES 28.173 (HH) 0.000 - 0.045 ug/L   CBC (AM Draw)   Result Value Ref Range    WBC 15.1 (H) 4.0 - 11.0 10e9/L    RBC Count 2.78 (L) 4.4 - 5.9 10e12/L    Hemoglobin 8.8 (L) 13.3 - 17.7 g/dL    Hematocrit 26.8 (L) 40.0 - 53.0 %    MCV 96 78 - 100 fl    MCH 31.7 26.5 - 33.0 pg    MCHC 32.8 31.5 - 36.5 g/dL    RDW 18.6 (H) 10.0 - 15.0 %    Platelet Count 232 150 - 450 10e9/L   Basic metabolic panel (AM Draw)   Result Value Ref Range    Sodium 138 133 - 144 mmol/L    Potassium 4.2 3.4 - 5.3 mmol/L    Chloride 99 94 - 109 mmol/L    Carbon Dioxide 28 20 - 32 mmol/L    Anion Gap 11 3 - 14 mmol/L    Glucose 144 (H) 70 - 99 mg/dL    Urea Nitrogen 42 (H) 7 - 30 mg/dL    Creatinine 3.70 (H) 0.66 - 1.25 mg/dL    GFR Estimate 16 (L) >60 mL/min/1.7m2    GFR Estimate If Black 19 (L) >60 mL/min/1.7m2    Calcium 7.6 (L) 8.5 - 10.1 mg/dL   Magnesium (AM Draw)   Result Value Ref Range    Magnesium 1.7 1.6 - 2.3 mg/dL   Phosphorus (AM Draw)   Result Value Ref Range    Phosphorus 4.5 2.5 - 4.5 mg/dL   EKG 12-lead, tracing only   Result Value Ref Range    Interpretation ECG Click View Image link to view waveform and result    Blood gas arterial with oxyhemoglobin   Result Value Ref Range    pH Arterial 7.45 7.35 - 7.45 pH    pCO2 Arterial 41 35 - 45 mm Hg    pO2 Arterial 118 (H) 80 - 105 mm Hg    Bicarbonate Arterial 28 21 - 28 mmol/L    Oxyhemoglobin Arterial 98 92 - 100 %    Base Excess Art 3.8 mmol/L   Hemoglobin   Result Value Ref Range    Hemoglobin 8.6 (L) 13.3 - 17.7 g/dL   Vancomycin level    Result Value Ref Range    Vancomycin Level 11.1 mg/L   Troponin I   Result Value Ref Range    Troponin I ES 32.861 (HH) 0.000 - 0.045 ug/L   Glucose by meter   Result Value Ref Range    Glucose 158 (H) 70 - 99 mg/dL

## 2018-08-11 NOTE — PROGRESS NOTES
[facility] Progress Note  [facility] ICU    [facility] was paged for  request for EOL. SH contacted Fr. Peres, who met with family and anointed pt.     Ochoa Moncdaa M.Div.  Chaplain Resident  [phone] Pager

## 2018-08-11 NOTE — PROGRESS NOTES
Advised by MD Gibbs to cut tip of ETT to secure tube and prevent kinks in tube. Cut approx 3cm off end and avoided kink in tube requiring continual support. No distress noted by patient

## 2018-08-11 NOTE — PROGRESS NOTES
[facility]   CRITICAL CARE NOTE     Bean Croft MRN: [id number]  1940  Date of Admission:8/10/2018          Problem List:   1. Acute respiratory failure   2. Acute blood loss presumably from GI source   3. Elevated troponin   4. ESRD on HD      24-Hour Goals   1. Goals of care discussion   2. Supportive care       Overnight Events   No acute issues overnight. Elevated troponins.        Lines/Tubes/Draines/Devices   CVC: RIJ    ETT: yes  House: yes      ICU Prophylaxis:   1. DVT: mechanical  2. VAP: HOB 30 degrees, chlorhexidine rinse  3. Stress Ulcer: PPI/H2 blocker  4. Restraints: Nonviolent soft two point restraints required and necessary for patient safety and continued cares and good effect as patient continues to pull at necessary lines, tubes despite education and distraction. Will readdress daily.   5. IV Access - central access required and necessary for continued patient cares  6. Feeding - npo      Medications:       - MEDICATION INSTRUCTIONS for Dialysis Patients -   Does not apply See Admin Instructions     ceFEPIme (MAXIPIME) IV  1 g Intravenous Q24H     chlorhexidine  15 mL Mouth/Throat BID     pantoprazole (PROTONIX) IV  40 mg Intravenous Daily     vancomycin place tadeo - receiving intermittent dosing  1 each Does not apply See Admin Instructions     bisacodyl, fentaNYL, naloxone      Review of Systems:   Unable to obtain due to critical illness          Physical Exam:   Temp:  [94.1  F (34.5  C)-99.3  F (37.4  C)] 99.3  F (37.4  C)  Pulse:  [93-97] 97  Heart Rate:  [64-98] 75  Resp:  [13-48] 21  BP: ()/(46-67) 109/59  FiO2 (%):  [40 %-100 %] 40 %  SpO2:  [90 %-100 %] 97 %    Intake/Output Summary (Last 24 hours) at 08/11/18 1147  Last data filed at 08/11/18 1000   Gross per 24 hour   Intake          1594.13 ml   Output              390 ml   Net          1204.13 ml     Wt Readings from Last 4 Encounters:   08/11/18 63.1 kg (139 lb 1.8 oz)   07/26/18 63.5 kg  (140 lb)   07/09/18 63.3 kg (139 lb 9.6 oz)   06/11/18 62.6 kg (138 lb 1.6 oz)     BP - Mean:  [58-88] 81  Ventilation Mode: CMV/AC  (Continuous Mandatory Ventilation/ Assist Control)  FiO2 (%): 40 %  Rate Set (breaths/minute): 14 breaths/min  Tidal Volume Set (mL): 500 mL  PEEP (cm H2O): 10 cmH2O  Oxygen Concentration (%): 50 %  Resp: 21    Recent Labs  Lab 08/11/18  0500   PH 7.45   PCO2 41   PO2 118*   HCO3 28       GEN: intubated, sedated.    HEENT: head ncat, sclera anicteric, OP patent, trachea midline   PULM: unlabored synchronous with vent, clear anteriorly    CV/COR: RRR S1S2 no gallop,  No rub, no murmur  ABD: soft nontender, hypoactive bowel sounds, no mass  EXT:  Edema   warm  NEURO: grossly intact  SKIN: no obvious rash      Assessment and plan :     Neurology/Psychiatry/Pain/Sedation:   1. Sedation for vent synchrony. Keep RASS -2 to -4 using propofol and prn fentanyl     Cardiovascular/Hemodynamics/Pulmonary/Ventilator Management/Renal/Fluids/Electrolytes/ID/Endo/Heme-Onc  1. Acute respiratory failure presumed 2/2 volume overload with acute vs chronic anemia in setting of CHF and ESRD on HD. Remains hemodynamically stable. Unable to tolerate fluid removal by HD. Plan to proceed with GI bleeding w/u in addition to possible coronary w/u; however, family has requested a conservative plan. Apparently, he had living will that demonstrate that he would not want any heroic measures with regard to mechanical ventilation and CPR. His wife and daughter, told me that they were unsure if they should have called the ambulance yesterday given his wishes.   2. ESRD. HD per renal  3. ICU glucose protocol  4. Mechanical DVT ppx.  5. Empiric broad spectrum abx pending cultures     GI/Nutrition:   1. NPO. PPI for ppx     Disposition/Code Status/Other  1. Per wife and eldest daughter, the patient's preference would be DNR and no escalation of care. They have requested to keep him supported until his other 2 daughter's  arrive. Ultimate plan would be terminal extubation to comfort care. Discussion was had with wife, daughter, RN, cardiology and GI who are in agreement.   2. Code: DNR     ICU Prophylaxis:   1. DVT: mechanical  2. VAP: HOB 30 degrees, chlorhexidine rinse  3. Stress Ulcer: PPI/H2 blocker  4. Restraints: Nonviolent soft two point restraints required and necessary for patient safety and continued cares and good effect as patient continues to pull at necessary lines, tubes despite education and distraction. Will readdress daily.   5. IV Access - central access required and necessary for continued patient cares  6. Feeding - npo     I have personally reviewed the daily labs, imaging studies, cultures and discussed the case with referring physician and consulting physicians.      This patient is critically ill and I have provided 30 minutes of critical care time (excluding procedures) on August 10, 2018.      Tom Hill MD   Critical Care Staff  [phone]      Data:   All data and imaging reviewed     ROUTINE ICU LABS (Last four results)  CMP  Recent Labs  Lab 08/11/18  0300 08/10/18  1855 08/10/18  1539    138 138   POTASSIUM 4.2 3.7 4.2   CHLORIDE 99 100 100   CO2 28 27 20   ANIONGAP 11 11 18*   * 161* 230*   BUN 42* 53* 104*   CR 3.70* 3.28* 6.88*   GFRESTIMATED 16* 18* 8*   GFRESTBLACK 19* 22* 9*   TAMMY 7.6* 8.3* 8.0*   MAG 1.7  --   --    PHOS 4.5  --   --    PROTTOTAL  --   --  5.9*   ALBUMIN  --   --  3.0*   BILITOTAL  --   --  0.4   ALKPHOS  --   --  58   AST  --   --  41   ALT  --   --  16     CBC  Recent Labs  Lab 08/11/18  1006 08/11/18  0500 08/11/18  0300 08/10/18  1855 08/10/18  1539   WBC  --   --  15.1* 26.1* 16.8*   RBC  --   --  2.78* 2.16* 1.82*   HGB 8.9* 8.6* 8.8* 7.2* 6.1*   HCT  --   --  26.8* 22.2* 19.0*   MCV  --   --  96 103* 104*   MCH  --   --  31.7 33.3* 33.5*   MCHC  --   --  32.8 32.4 32.1   RDW  --   --  18.6* 14.8 15.1*   PLT  --   --  232 380 314     INRNo lab results found in  last 7 days.  Arterial Blood Gas  Recent Labs  Lab 08/11/18  0500   PH 7.45   PCO2 41   PO2 118*   HCO3 28       All cultures:    Recent Labs  Lab 08/10/18  1959 08/10/18  1958 08/10/18  1633 08/10/18  1605 08/10/18  1553   CULT Canceled, Test creditedDuplicate request Canceled, Test creditedDuplicate request No growth after 7 hours No growth after 7 hours PENDING     Recent Results (from the past 24 hour(s))   XR Chest Port 1 View    Narrative    XR CHEST PORT 1 VW 8/10/2018 3:48 PM    COMPARISON: 10/27/2016    HISTORY: Respiratory distress, endotracheal tube positioning.      Impression    IMPRESSION: Endotracheal tube tip approximately 4 cm above the amina.  Patchy opacities over both lungs, RIGHT worse than LEFT, edema versus  atypical infection. No pneumothorax seen on either side.    REY PRUITT MD   XR Chest Port 1 View    Narrative    CHEST PORTABLE ONE VIEW 8/10/2018 8:07 PM     HISTORY: Check central line.    COMPARISON: Chest x-ray 8/10/2018.      Impression    IMPRESSION:   1. New right neck central line tip at the superior vena cava.  Nasogastric tube is identified. The ET tube tip is difficult to  visualize.  2. Bilateral vascular and interstitial prominence again noted. This  may be mildly improved on the right but is unchanged on the left.    SOBIA RICHARDSON MD   CT Chest Abdomen Pelvis w/o Contrast    Narrative    CT CHEST/ABDOMEN/PELVIS WITHOUT CONTRAST  8/10/2018 10:02 PM    HISTORY: Occult blood positive stool. Patient with congestive heart  failure, ESRD with questionable sepsis and acute respiratory failure.    TECHNIQUE: CT scan obtained of the chest, abdomen, and pelvis without  IV contrast. Radiation dose for this scan was reduced using automated  exposure control, adjustment of the mA and/or kV according to patient  size, or iterative reconstruction technique.    COMPARISON:  CT abdomen and pelvis 7/23/2016.    FINDINGS:  Chest: The patient is intubated. Nasogastric tube in place.  Thoracic  aortic and coronary artery calcifications. Small bilateral pleural  effusions. Bibasilar atelectasis. There are patchy areas of  consolidation suggested at the bilateral lower lobes and posterior  left upper lobe adjacent to the major fissure. Mild emphysematous  changes. Some respiratory motion limits detail assessment of the  lungs. There are a few scattered mildly prominent lymph nodes in the  mediastinum. One of the larger lymph nodes is 2.0 x 1.2 cm distal  right paratracheal location series 2 image 28. There are other  examples.    Abdomen/pelvis: Diffuse vascular calcifications. House catheter  decompresses the bladder. Small gas in the bladder. Prostate is  prominent measuring 4.7 cm. No bowel obstruction. Colonic  diverticulosis. No convincing diverticulitis or other bowel  inflammation. Normal appendix.    Within the limits of unenhanced scanning, the liver, gallbladder,  spleen, and pancreas shows no significance. Nodular thickening is  hypodense involving the left adrenal with internal density measurement  of 1.8 Hounsfield units. This nodule is 1.7 cm series 2 image 63 and  is consistent with an adenoma. It appears stable. Stable minimal  thickening of the right adrenal. Bilateral renal cysts again noted.  One of the largest is at the upper right kidney measuring 8.6 cm,  stable image 59. There are other cyst examples in the kidneys. No  enlarged lymph nodes in the abdomen or pelvis. Bone windows of the  chest, abdomen, and pelvis do not show any convincing destructive bony  lesion.      Impression    IMPRESSION:  1. Bilateral patchy areas of consolidation at the lung bases and  posterior left upper lobe. Correlate with evidence of multifocal  pneumonia. Lung base opacities may also represent atelectasis.  2. Small bibasilar pleural effusions.  3. A few nonspecific mildly prominent mediastinal lymph nodes.  4. Colonic diverticulosis without diverticulitis. No acute  inflammatory change of the  bowel. Please note that this examination  cannot exclude any underlying colonic lesions.  5. Enlarged prostate.    SOBIA RICHARDSON MD

## 2018-08-11 NOTE — PROGRESS NOTES
Renal Medicine Progress Note                                Bean Croft MRN# [id number]   Age: 78 year old YOB: 1940   Date of Admission: 8/10/2018 Hospital LOS: 1                  Assessment/Plan:     Presented via EMS with respiratory distress.  Intubated in route.  Evaluated for  apparent volume overload in setting of ESRD     1.  ESRD         -etiology presumed diabetic            -TS schedule [facility]            -target weight 63 kg              -off weight 08/07/18   61.4 kg             -AVF  2.  Anemia              -LA NENA plus iv Fe              -last Hgb week of 07/28   8.6 g/dl  3.  Mineral Bone Disease             - range              -intermittent elevated PO4  4.  Respiratory failure              -intubated             -volume v infectious process  5.  Hypotension             -typical dialysis run BPs 130 to 150 systolic   -currently on vasopressin      Dialyzed yesterday  No UF secondary to hypotension    No dialysis today  Evaluate in am      Interval History:     Intubated and sedated.  On vasopressin.  Escalating trop.        ROS:     Intubated and sedated: ROS unable    Medications and Allergies:     Reviewed    Physical Exam:     Vitals were reviewed  Patient Vitals for the past 8 hrs:   BP Temp Temp src Heart Rate Resp SpO2 Weight   08/11/18 0800 - - Esophageal - - - -   08/11/18 0600 91/55 98.8  F (37.1  C) - 76 16 99 % -   08/11/18 0515 99/57 99  F (37.2  C) - 79 15 100 % -   08/11/18 0500 98/58 - - 77 16 100 % -   08/11/18 0445 98/57 99  F (37.2  C) - 79 17 100 % -   08/11/18 0430 92/55 98.8  F (37.1  C) - 75 15 - -   08/11/18 0415 (!) 88/56 99  F (37.2  C) - 75 15 - -   08/11/18 0400 (!) 86/51 99  F (37.2  C) - 73 14 100 % -   08/11/18 0345 (!) 77/46 99.1  F (37.3  C) - 73 15 - -   08/11/18 0330 (!) 76/46 99.1  F (37.3  C) - 71 14 - -   08/11/18 0316 - - - - - 100 % 63.1 kg (139 lb 1.8 oz)   08/11/18 0315 (!) 71/52 - - 75 (!) 48 - -   08/11/18 0300 96/57  99.1  F (37.3  C) - 78 17 - -   08/11/18 0245 102/61 99.1  F (37.3  C) - 80 19 - -   08/11/18 0230 108/57 99  F (37.2  C) - - - - -   08/11/18 0215 102/59 99  F (37.2  C) - 78 18 - -   08/11/18 0200 92/56 99  F (37.2  C) - 77 17 - -   08/11/18 0145 93/58 99  F (37.2  C) - 77 17 - -   08/11/18 0130 106/63 98.8  F (37.1  C) - 84 20 - -   08/11/18 0115 95/59 98.8  F (37.1  C) - 77 17 - -   08/11/18 0100 93/57 98.6  F (37  C) - 76 17 - -     I/O last 3 completed shifts:  In: 1144.96 [I.V.:144.96]  Out: 243 [Urine:243]    Vitals:    08/10/18 1610 08/10/18 1745 08/11/18 0316   Weight: 64 kg (141 lb 1.5 oz) 61.9 kg (136 lb 7.4 oz) 63.1 kg (139 lb 1.8 oz)     GENERAL:  intubated and sedated  HEENT: ETT  RESP:  clear anteriorly  CV: RRR, normal S1 S2  ABDOMEN: soft, nontender, no HSM or masses and bowel sounds normal  MS: no clubbing, cyanosis   SKIN: clear without significant rashes or lesions  EXT: warm    Data:       Recent Labs  Lab 08/11/18  0300 08/10/18  1855 08/10/18  1539    138 138   POTASSIUM 4.2 3.7 4.2   CHLORIDE 99 100 100   CO2 28 27 20   ANIONGAP 11 11 18*   * 161* 230*   BUN 42* 53* 104*   CR 3.70* 3.28* 6.88*   GFRESTIMATED 16* 18* 8*   GFRESTBLACK 19* 22* 9*   TAMMY 7.6* 8.3* 8.0*           Recent Labs  Lab 08/10/18  1539   ALBUMIN 3.0*       Recent Labs  Lab 08/11/18  0500 08/11/18  0300 08/10/18  1855 08/10/18  1539   PHOS  --  4.5  --   --    HGB 8.6* 8.8* 7.2* 6.1*         G Rene Rodas    [facility] - Nephrology  [phone]

## 2018-08-11 NOTE — PHARMACY-VANCOMYCIN DOSING SERVICE
Pharmacy Vancomycin Initial Note  Date of Service August 10, 2018  Patient's  1940  78 year old, male    Indication: Sepsis    Current estimated CrCl = Estimated Creatinine Clearance: 16.3 mL/min (based on Cr of 3.28).    Creatinine for last 3 days  8/10/2018:  3:39 PM Creatinine 6.88 mg/dL;  6:55 PM Creatinine 3.28 mg/dL    Recent Vancomycin Level(s) for last 3 days  No results found for requested labs within last 72 hours.      Vancomycin IV Administrations (past 72 hours)                   vancomycin (VANCOCIN) 1,500 mg in sodium chloride 0.9 % 250 mL intermittent infusion (mg) 1,500 mg New Bag 08/10/18 170                Nephrotoxins and other renal medications (Future)    Start     Dose/Rate Route Frequency Ordered Stop    08/10/18 1959  vancomycin place tadeo - receiving intermittent dosing      1 each Does not apply SEE ADMIN INSTRUCTIONS 08/10/18 1959      08/10/18 1945  norepinephrine (LEVOPHED) 16 mg in D5W 250 mL infusion      0.03-0.4 mcg/kg/min × 64 kg (Dosing Weight)  1.8-24 mL/hr  Intravenous CONTINUOUS 08/1940            Contrast Orders - past 72 hours     None                Plan:  1.  Start vancomycin  1500 mg IV x1 then intermittent per levels.   2.  Goal Trough Level: 15-20 mg/L   3.  Pharmacy will check trough levels as appropriate in AM 8/11.   4. Renal function will be watched closely, followed by nephrology for dialysis  5. Wallace method utilized to dose vancomycin therapy: Method 1    Mayra Bryson

## 2018-08-11 NOTE — PROGRESS NOTES
[facility] ICU RESPIRATORY NOTE  Date of Admission: 8/10/18  Date of Intubation (most recent): 8/10/18  Reason for Mechanical Ventilation: Resp. Failure/Unresponsive  Number of Days on Mechanical Ventilation: 2  Met Criteria for Pressure Support Trial: No  Length of Pressure Support Trial:  Reason for Stopping Pressure Support Trial:  Reason for No Pressure Support Trial: Per MD    Significant Events Today: None overnight    ABG Results: No lab results found in last 7 days.    Ventilation Mode: CMV/AC  (Continuous Mandatory Ventilation/ Assist Control)  FiO2 (%): 40 %  Rate Set (breaths/minute): 14 breaths/min  Tidal Volume Set (mL): 500 mL  PEEP (cm H2O): 10 cmH2O  Oxygen Concentration (%): 60 %  Resp: 17      ETT appearance on chest x-ray: In good position     Plan:  Continue full support    Rene Conn RT

## 2018-08-12 NOTE — PROGRESS NOTES
Brief ICU Note    Saw Mr. Croft this morning with his son-in-law at the bedside.   - He is sitting in the chair comfortably and on room air. He is alert, oriented x3.   - He has no current complaints. Pain is controlled.   - His wishes remain as is; no escalation of care and no interventions.   - Plan is to transfer to hospitalist today.     Tom Hill MD   of Medicine  Interventional Pulmonary  Department of Pulmonary, Allergy, Critical Care and Sleep Medicine   [facility]  Pager: [phone]   Office: [phone]  Email: [email or web address]

## 2018-08-12 NOTE — PROVIDER NOTIFICATION
Pt and his wife tell nursing staff that they wish for comfort cares instead of further aggressive treatment or close monitoring.  Currently DNR/DNI, Dr. Sharp notified of pt and family request, arrives at bedside to assess and discuss plan of care with pt and his family, orders received for comfort cares.

## 2018-08-12 NOTE — PROGRESS NOTES
Pt transferred out of the ICU to station 88; comfort measures. Family at patient's side. No belongings.

## 2018-08-12 NOTE — PLAN OF CARE
Problem: Patient Care Overview  Goal: Plan of Care/Patient Progress Review  Outcome: Improving  Awaited arrival of out of town family. Once arrived, turned off Propofol (see MAR). Pt arouses easily and follows commands. Extubated at 2230 to comfort care protocol. See Provider note for pt/family discussion. Code level changed to DNR/DNI. A&O. Pt off all sched meds and lab draws.   Assessment and I&Os done per pt/family wishes. Monitoring per comfort care orders. Family at bedside and in agreement of plan of care.

## 2018-08-12 NOTE — PLAN OF CARE
Problem: Activity Intolerance (Adult)  Goal: Identify Related Risk Factors and Signs and Symptoms  Related risk factors and signs and symptoms are identified upon initiation of Human Response Clinical Practice Guideline (CPG).  Outcome: No Change  Oriented but lethargic secondary to fentanyl IV.  Restless at times but usually resting with eyes closed.  In chair, repositioning/boosting up in chair as needed with 2 assist.  Ate some food with assistance from family.  Internal jugular in right neck and 2 IV in right arm saline locked.  +4 edema in bilateral feet.  Lubriderm cream applied to feet for itchiness.  Refusing repositioning.  Family at bedside.  Nursing will continue to monitor.

## 2018-08-12 NOTE — PROGRESS NOTES
Renal Medicine       Decisions of past 24 hours reviewed    Patient reiterated desire to pursue comfort measures  No further dialysis planned          Recent Labs  Lab 08/11/18  0300      POTASSIUM 4.2   CHLORIDE 99   CO2 28   ANIONGAP 11   *   BUN 42*   CR 3.70*   GFRESTIMATED 16*   GFRESTBLACK 19*   TAMMY 7.6*           HAIM Rodas    [facility]  [phone]

## 2018-08-12 NOTE — PROGRESS NOTES
I was asked by the bedside nurse 2 come to the room and speak with the family about the goals of care now that the patient has been extubated at his request.  I spoke with multiple family members, the patient's wife, and the patient about his wishes.  He was clear that he wants no further medical interventions and is prepared to die.  Accordingly, I had his vasopressin turned off, and will ensure that he has ample medications available if he becomes at all uncomfortable.    Mando Sharp MD  [facility] Intensivist Service

## 2018-08-12 NOTE — PROGRESS NOTES
[facility]    Hospitalist Progress Note    Assessment & Plan   78-year-old gentleman who has a significant history for renal disease on hemodialysis, has a history of coronary artery disease with an MI last year.  He has a history of CHF, type 2 diabetes mellitus, gastroesophageal reflux, hypertension and peripheral vascular disease who was admitted when he experienced acute shortness of breath.  The wife reports that he was at home when all of a sudden, they got back from the doctor's office and he went into the bathroom and developed acute shortness of breath and weakness and the ambulance had to be called.  He was brought to the hospital and intubated.  He was also hypotensive and was noticed to have a hemoglobin of 6.1 with guaiac positive stools.     He has been extubated per his requests, made comfort care and DNR. Plan is to discharge him tomorrow on hospice to home. Palliative care/SW consult placed.    Neurology/Psychiatry/Pain/Sedation:   -comfort care orders in. Pain is controlled. On RA.       Cardiovascular/Hemodynamics/Pulmonary/Ventilator Management/Renal/Fluids/Electrolytes/ID/Endo/Heme-Onc  1. Acute respiratory failure presumed 2/2 volume overload with acute vs chronic anemia in setting of CHF and ESRD on HD. Remains hemodynamically stable. Unable to tolerate fluid removal by HD. Plan to proceed with GI bleeding w/u in addition to possible coronary w/u; however, family has requested a conservative plan. Apparently, he had living will that demonstrate that he would not want any heroic measures with regard to mechanical ventilation and CPR. Is now comfort care, no dialysis or further interventions.   2. ESRD. No dialysis.         Disposition/Code Status/Other  1. Per wife and eldest daughter, the patient's preference would be DNR and no escalation of care. They have requested to keep him supported until his other 2 daughter's arrive. Ultimate plan would be terminal extubation to  comfort care. Discussion was had with wife, daughter, RN, cardiology and GI who are in agreement. Was extubated 8/11, now comfort care.  2. Code: DNR/DNI    Rex Haley MD   Text Page (7am to 6pm)    Interval History   Extubated 8/11, on comfort care and DNR. Plan is discharge with hospice tomm to home.     -Data reviewed today: I reviewed all new labs and imaging results over the last 24 hours. I personally reviewed no images or EKG's today.    Physical Exam   Temp: 100  F (37.8  C) Temp src: Esophageal BP: 104/52   Heart Rate: 79 Resp: 11 SpO2: (!) 78 % O2 Device: None (Room air) Oxygen Delivery: 8 LPM  Vitals:    08/10/18 1610 08/10/18 1745 08/11/18 0316   Weight: 64 kg (141 lb 1.5 oz) 61.9 kg (136 lb 7.4 oz) 63.1 kg (139 lb 1.8 oz)     Vital Signs with Ranges  Temp:  [99  F (37.2  C)-100.8  F (38.2  C)] 100  F (37.8  C)  Heart Rate:  [65-80] 79  Resp:  [11-29] 11  BP: ()/(43-67) 104/52  FiO2 (%):  [40 %] 40 %  SpO2:  [73 %-98 %] 78 %  I/O last 3 completed shifts:  In: 1094.38 [P.O.:140; I.V.:954.38]  Out: 579 [Urine:279; Emesis/NG output:300]    HEART:  S1, S2.  He does have a systolic murmur.   LUNGS:  Clear to auscultation.  There was some upper airway noise.  It was difficult for me to auscultate his lungs in the supine position.   ABDOMEN:  Soft, normal bowel sounds, nontender, nondistended.   EXTREMITIES:  Right and left lower extremity had trace pedal edema.   NEUROLOGIC:  He is sedated and intubated.   PSYCHIATRIC:  Unable to obtain.     Medications     sodium chloride Stopped (08/11/18 1667)       - MEDICATION INSTRUCTIONS for Dialysis Patients -   Does not apply See Admin Instructions     glycopyrrolate  0.2 mg Intravenous Once       Data     Recent Labs  Lab 08/11/18  1700 08/11/18  1006 08/11/18  0900 08/11/18  0500 08/11/18  0300 08/10/18  1855 08/10/18  1539   WBC  --   --   --   --  15.1* 26.1* 16.8*   HGB 8.3* 8.9*  --  8.6* 8.8* 7.2* 6.1*   MCV  --   --   --   --  96 103* 104*   PLT  --    --   --   --  232 380 314   NA  --   --   --   --  138 138 138   POTASSIUM  --   --   --   --  4.2 3.7 4.2   CHLORIDE  --   --   --   --  99 100 100   CO2  --   --   --   --  28 27 20   BUN  --   --   --   --  42* 53* 104*   CR  --   --   --   --  3.70* 3.28* 6.88*   ANIONGAP  --   --   --   --  11 11 18*   TAMMY  --   --   --   --  7.6* 8.3* 8.0*   GLC  --   --   --   --  144* 161* 230*   ALBUMIN  --   --   --   --   --   --  3.0*   PROTTOTAL  --   --   --   --   --   --  5.9*   BILITOTAL  --   --   --   --   --   --  0.4   ALKPHOS  --   --   --   --   --   --  58   ALT  --   --   --   --   --   --  16   AST  --   --   --   --   --   --  41   LIPASE  --   --   --   --   --  301  --    TROPI 27.921*  --  32.861*  --  28.173* 10.963* 5.992*       Imaging:   No results found for this or any previous visit (from the past 24 hour(s)).

## 2018-08-13 NOTE — PROGRESS NOTES
"SPIRITUAL HEALTH SERVICES  Spiritual Assessment Progress Note  [facility] 88   met with pt's wife and daughter and had a lengthy conversation with me about pt's life - his work in construction, their family of 5 children (one with downs syndrome), having foreign exchange students live in their home and their multiple trips around the world in jail.      Pt is Episcopal and he has been anointed and communion has been offered.  No other Episcopal needs at this time.    Pt has made his wishes known - stating when he came into the hospital that \" he wanted to die - he was ready\".   Family is comforted in knowing his wishes have been followed.   Family was tearful at times but stated that they often cope with humor.     listened to their stories and provided emotional support as they prepare for pt's EOL. .     SH continues to be available should needs arise.                                                                                                                                            Shania Henley M.Div., [facility]  Staff    Pager [phone]  "

## 2018-08-13 NOTE — PLAN OF CARE
Problem: Patient Care Overview  Goal: Plan of Care/Patient Progress Review  Outcome: No Change  Pt is on comfort cares. JUNG orientation, not talking. Lethargic. Dilaudid given x1, Ativan given x1 and Robinol x1 per family request. +3 edema in BLE. Up in chair, refuses to lie in bed. Regular diet. House with oliguria. R internal jugular removed. 2 PIVs in R arm, SL. Fistula in place LUE. Discharge pending home with hospice vs hospice facility. Will continue to monitor.

## 2018-08-13 NOTE — CONSULTS
Care Transition Initial Assessment - SW  Reason For Consult: discharge planning, end of life/hospice  Met with: Patient    Active Problems:    Acute respiratory failure (H)       DATA  Lives With: spouse  Living Arrangements: house  Description of Support System: Supportive, Involved  Who is your support system?: Wife, Children  Support Assessment: Adequate family and caregiver support.   Identified issues/concerns regarding health management: SW consult noted for discharge planning/hospice planning. Patient is a 78 year old male now on comfort care. He was living at home with his spouse previously. Met with spouse, Carmina and daughter, Anamaria while patient slept soundly. We discussed the Medicare benefit for hospice care and the different settings where this can be received. Noted the limits of coverage. They are thinking home will not be a good option and are interested in a care facility. Reviewed SNF and residential hospice facilities and provided written information. Offered a meeting with [facility], which they agreed to. Made a referral; Umesh will see them this morning. Family will consider further before agreeing to any referrals.    Quality Of Family Relationships: supportive, involved     ASSESSMENT  Cognitive Status: Not assessed  Concerns to be addressed: SW is following for discharge coordination.   PLAN  Financial costs for the patient includes: Private pay charges for SNF, including down payment requirements and residential hospice costs discussed.  Patient given options and choices for discharge: Yes  Patient/family is agreeable to the plan? Yes  Patient/family Goals and Preferences: SNF vs. Residential Hospice       Update  A DOD referral was sent to [facility], per request of Umesh with  Hospice. Family reported they are familiar with this facility. They request a private room, which was noted in the referral.    Update  Jackie at [facility] can accept  patient on Tuesday in a shared room with no roommate. They will move patient to a private room when available. The deposit requested is $5000 and the private room charge is $40. Updated patient's spouse and daughter, Anamaria. Both agree to this plan. Will update  Hospice when the discharge time is set.

## 2018-08-13 NOTE — CONSULTS
Writer met with patient's wife Carmina and daughter Anamaria.  Discussed hospice benefit and philosophy.  Family goal is for patient to discharge to a SNF with hospice care.  [facility] preference [facility], as pt has been there in the past and is close to home for his wife.  Discussed that facility cost is private pay, and not covered by medicare.  Reviewed hospice medications with family.  KATELYNN Garcia is working on sending referrals for pt.  There is no discharge plan in place yet.  Discussed that MD will assess pt tomorrow to determine if he is stable to transport.  Please order the following meds at discharge and send with pt.  No ranges, and please bubble pack.  Please update hospice tomorrow so discharge plans can be coordinated.    Comfort meds per palliative care recommendations/orders today:  Acetaminophen 650mg supp, give 1 supp rectally every 4 hours PRN pain/fever  Atropine 1% drops, give 2 drops po/sl every 2 hours PRN to dry secretions  Lorazepam 2mg/ml, give 2mg (1ml) po/sl every 4 hours PRN anxiety/restlessness  Haloperidol 2mg/ml, give 1mg (0.5ml) po/sl every 6 hours PRN nausea/agitation  Hydromorphone 10mg/ml, give 4mg (0.4ml) po/sl every 2 hours PRN pain/dyspnea  Bisacodyl supp 10mg, give 1 supp daily PRN constipation.    Thank you for this consult.

## 2018-08-13 NOTE — PLAN OF CARE
Problem: Dying Patient, Actively (Adult)  Goal: Comfort/Pain Control  Patient will demonstrate the desired outcomes by discharge/transition of care.   Outcome: No Change  atient on comfort cares. JUNG orientation. Lethargic, moaning on shift @ times. Not talking; seems very sleepy/drowsy. Moaning @ times, fentanyl IV given per family request. VS deferred. Patient shook head no when asked if in pain later on shift. No restlessness seen. +3-4 edema noted to BLE. House catheter in place, has oliguria. Internal jugular in right neck, 3 lumens all SL. 2 PIV's in right arm, SL. Fistula in place on LUE. Patient resting in chair t/o night. Repositioning/boosting as requested by wife. Oral cares done on shift. Discharge pending deciding on going home with hospice or to a hospice facility. Continue to keep comfortable and monitor.

## 2018-08-13 NOTE — PLAN OF CARE
Problem: Patient Care Overview  Goal: Plan of Care/Patient Progress Review  Outcome: No Change  Patient is lethargic and sleeping frequently. Comfort cares. Will wake up at times and become restless. Family requested fentanyl IV and ativan IV be given tonight. Patient has been resting comfortably now. In chair and repositioning/boosting as needed and as requested by family with assist x 2. Family reported that patient ate small amount of food earlier today with assistance. Internal jugular in right neck and two peripheral IV's in right arm saline locked. Wife is staying overnight and currently at bedside. Plan: Family is deciding if patient should go home with hospice or go to a hospice facility.

## 2018-08-14 NOTE — PLAN OF CARE
Problem: Patient Care Overview  Goal: Plan of Care/Patient Progress Review  Outcome: Adequate for Discharge Date Met: 08/14/18    Patient discharged at 1:20 PM to [facility] for hospice care.  IVs were discontinued by NA. Pain at time of discharge was controlled with Sublingual Dilaudid. Belongings returned to patient. Pt sent with jessica for end of life care. Discharge instructions and medications were sent with [facility], prescriptions were sent with.  At time of discharge, patient's condition was stable and left the unit by stretcher from [facility].

## 2018-08-14 NOTE — PLAN OF CARE
Problem: Dying Patient, Actively (Adult)  Goal: Comfort/Pain Control  Patient will demonstrate the desired outcomes by discharge/transition of care.   Outcome: Improving  Pt on comfort cares. Lethargic and sleeping post of shift. Dilaudid given x 1 per family request. House catheter with minimal output. PIV SL. Pt. Comfortable in recliner, repo per wife's request.  Possible discharge to [facility] today. Continue to support.

## 2018-08-14 NOTE — PROGRESS NOTES
KATELYNN  D) Patient is stable for discharge today to [facility] with hospice enrollment.  PAS-RR  Per DHS regulation, KATELYNN completed and submitted PAS-RR to [place] [employer] Direct Connect via the [employer].  PAS-RR confirmation # is :[id number]   Questions may be directed to [facility] at #[phone], option #4 for PAS-RR staff.  [facility] [place] stretcher ride at 1300. Stretcher is needed due to patient's comfort care status and inability to tolerate a wheelchair ride. The PCS form was completed and faxed. Will fax orders and the PAS to [facility]. Updated patient's spouse, Carmina and daughter Anamaria.    A) Spouse and daughter agree to this plan. They are aware of the $5000 check required upon admission.    P) Discharge to [facility] today at 1300. [facility] enrollment set at 1530

## 2018-08-14 NOTE — PROGRESS NOTES
[facility]    Hospitalist Progress Note    Assessment & Plan   78-year-old gentleman who has a significant history for renal disease on hemodialysis, has a history of coronary artery disease with an MI last year.  He has a history of CHF, type 2 diabetes mellitus, gastroesophageal reflux, hypertension and peripheral vascular disease who was admitted when he experienced acute shortness of breath.  The wife reports that he was at home when all of a sudden, they got back from the doctor's office and he went into the bathroom and developed acute shortness of breath and weakness and the ambulance had to be called.  He was brought to the hospital and intubated.  He was also hypotensive and was noticed to have a hemoglobin of 6.1 with guaiac positive stools.     He has been extubated per his requests, made comfort care and DNR/DNI. Plan is to discharge him tomorrow on hospice to nursing home in [place] if not imminent death.    NSTEMI  Acute respiratory failure secondary to fluid overload in setting of acute systolic CHF exacerbation and ESRD on dialysis.   Acute GI bleed with acute blood loss anemia and chronic anemia associated with renal failure  Community acquired pneumonia with unidentified organism  Acute respiratory failure presumed 2/2 volume overload with acute vs chronic anemia in setting of CHF and ESRD on HD. Unable to tolerate fluid removal by HD. Plan to proceed with GI bleeding w/u in addition to possible coronary w/u; however, family requested a conservative plan. Changed to comfort care and extubated 8/11.  -dilaudid IV PRN for pain  -appreciate palliative care assistance with medications  -SW looking into possible discharge to nursing home in [place] with hospice on 8/14/18    # Pain Assessment:  Current Pain Score 8/13/2018   Patient currently in pain? sleeping: patient not able to self report   Pain score (0-10) -   Pain location -   Pain descriptors -   CPOT pain score -    -  Bean is unable to participate in a plan for pain management; however, the plan  was discussed in a collaborative fashion with his spouse and daughter.   - Please see the plan for pain management as documented above    DVT Prophylaxis: Not indicated, comfort care  Code Status: DNR/DNI    Disposition: Expected discharge tomorrow to hospice at nursing home if medically stable.    Ellis Esquivel MD  [phone] (C)  [phone] (P)  Text Page (7 am to 6 pm)    Interval History   Patient has been sleeping since 3 AM and has only interacted minimally with some dilaudid given for pain.  Family at bedside and says he is comfortable and they are okay with discharge if he is stable tomorrow.    -Data reviewed today: I reviewed all new labs and imaging results over the last 24 hours. I personally reviewed no images or EKG's today.    Physical Exam             Resp: 16       Vitals:    08/10/18 1610 08/10/18 1745 08/11/18 0316   Weight: 64 kg (141 lb 1.5 oz) 61.9 kg (136 lb 7.4 oz) 63.1 kg (139 lb 1.8 oz)     Vital Signs with Ranges  Resp:  [16] 16  I/O last 3 completed shifts:  In: -   Out: 50 [Urine:50]    Constitutional: Sleeping comfortably    Medications   Only PRN medications: acetaminophen, acetaminophen, atropine, [START ON 8/16/2018] bisacodyl, glycopyrrolate, haloperidol, HYDROmorphone, LORazepam, LUBRIDERM, senna-docusate    Data     Recent Labs  Lab 08/11/18  1700 08/11/18  1006 08/11/18  0900 08/11/18  0500 08/11/18  0300 08/10/18  1855 08/10/18  1539   WBC  --   --   --   --  15.1* 26.1* 16.8*   HGB 8.3* 8.9*  --  8.6* 8.8* 7.2* 6.1*   MCV  --   --   --   --  96 103* 104*   PLT  --   --   --   --  232 380 314   NA  --   --   --   --  138 138 138   POTASSIUM  --   --   --   --  4.2 3.7 4.2   CHLORIDE  --   --   --   --  99 100 100   CO2  --   --   --   --  28 27 20   BUN  --   --   --   --  42* 53* 104*   CR  --   --   --   --  3.70* 3.28* 6.88*   ANIONGAP  --   --   --   --  11 11 18*   TAMMY  --   --   --    --  7.6* 8.3* 8.0*   GLC  --   --   --   --  144* 161* 230*   ALBUMIN  --   --   --   --   --   --  3.0*   PROTTOTAL  --   --   --   --   --   --  5.9*   BILITOTAL  --   --   --   --   --   --  0.4   ALKPHOS  --   --   --   --   --   --  58   ALT  --   --   --   --   --   --  16   AST  --   --   --   --   --   --  41   LIPASE  --   --   --   --   --  301  --    TROPI 27.921*  --  32.861*  --  28.173* 10.963* 5.992*       No results found for this or any previous visit (from the past 24 hour(s)).

## 2018-08-14 NOTE — DISCHARGE SUMMARY
[facility]    Discharge Summary  Hospitalist    Date of Admission:  8/10/2018  Date of Discharge:  8/14/2018    Discharge Diagnoses    CHF exacerbation (H)  Anemia in chronic kidney disease, on chronic dialysis (H)  Acute respiratory failure with hypoxia (H)  Acute pulmonary edema (H)  Gastrointestinal hemorrhage, unspecified gastrointestinal hemorrhage type  NSTEMI  Hospice care patient    History of Present Illness   Bean Croft is an 78-year-old gentleman who has a significant history for renal disease on hemodialysis, has a history of coronary artery disease with an MI last year.  He has a history of CHF, type 2 diabetes mellitus, gastroesophageal reflux, hypertension and peripheral vascular disease who was admitted when he experienced acute shortness of breath.  The wife reports that he was at home when all of a sudden, they got back from the doctor's office and he went into the bathroom and developed acute shortness of breath and weakness and the ambulance had to be called.  He was brought to the hospital and intubated.  He was also hypotensive and was noticed to have a hemoglobin of 6.1 with guaiac positive stools.     Hospital Course   Bean Croft was admitted on 8/10/2018.  The following problems were addressed during his hospitalization:    NSTEMI  Acute respiratory failure secondary to fluid overload in setting of acute systolic CHF exacerbation and ESRD on dialysis.   Acute GI bleed with acute blood loss anemia and chronic anemia associated with renal failure  Community acquired pneumonia bilateral lower lobes with unidentified organism  Acute respiratory failure presumed 2/2 volume overload with acute vs chronic anemia in setting of CHF and ESRD on HD. Unable to tolerate fluid removal by HD. Plan to proceed with GI bleeding w/u in addition to possible coronary w/u; however, family requested a conservative plan. Changed to comfort care and extubated 8/11.  -dilaudid IV PRN  for pain  -appreciate palliative care assistance with medications  -SW looking into possible discharge to nursing home in [place] with hospice on 8/14/18    Probable cause of death:  Acute respiratory failure  Community acquired pneumonia  NSTEMI  Acute systolic CHF exacerbation    Additional Diagnoses: Acute GI bleed, chronic anemia from renal failure, ESRD on dialysis    # Discharge Pain Plan:   - During his hospitalization, Bean experienced pain due to heart attack and pneumonia.  He is unable to participate in a plan for discharge pain management; however, the plan was discussed in a collaborative fashion with his family.   - Opioids prescribed on discharge: dilaudid PRN  - Duration of opioids after discharge: terminal, until he dies  - Bowel regimen: bisacodyl suppository  - Pharmacologic adjuvant: acetaminophen PRN    Ellis Esquivel MD  [phone] (C)  [phone] (P)  Text Page (7 am to 6 pm)    Significant Results and Procedures   CT Chest/Abdomen/Pelvis showed bilateral pneumonia  Troponin peaked at 32.861    Pending Results   These results will be called out if positive but patient is now in hospice care and nothing will be done with these lab results.  Unresulted Labs Ordered in the Past 30 Days of this Admission     Date and Time Order Name Status Description    8/10/2018 1633 Blood culture Preliminary     8/10/2018 1606 Blood culture Preliminary         Code Status   Comfort Care       Primary Care Physician   Shaila Silverman    Physical Exam             Resp: 18       Vitals:    08/10/18 1610 08/10/18 1745 08/11/18 0316   Weight: 64 kg (141 lb 1.5 oz) 61.9 kg (136 lb 7.4 oz) 63.1 kg (139 lb 1.8 oz)     Vital Signs with Ranges  Resp:  [14-18] 18  I/O last 3 completed shifts:  In: -   Out: 100 [Urine:100]    Constitutional: Resting comfortably    Discharge Disposition   Discharged to short-term care facility  Condition at discharge: Terminal    Consultations This Hospital Stay   PHARMACY  TO DOSE VANCO  PHARMACY TO DOSE VANCO  CARDIOLOGY IP CONSULT  GASTROENTEROLOGY IP CONSULT  HOSPITALIST IP CONSULT  SOCIAL WORK IP CONSULT  PALLIATIVE CARE ADULT IP CONSULT  SPIRITUAL HEALTH SERVICES IP CONSULT  CARE COORDINATOR IP CONSULT    Time Spent on this Encounter   I, Ellis Esquivel, personally saw the patient today and spent greater than 30 minutes discharging this patient.    Discharge Orders     General info for SNF   Length of Stay Estimate: Short Term Care: Estimated # of Days <30  Condition at Discharge: Terminal  Level of care: skilled   Rehabilitation Potential: N/A  Admission H&P remains valid and up-to-date: Yes  Recent Chemotherapy: N/A  Use Nursing Home Standing Orders: Yes     Mantoux instructions   Give two-step Mantoux (PPD) Per Facility Policy Yes     Reason for your hospital stay   Admitted for NSTEMI, acute respiratory failure secondary to fluid overload in setting of acute systolic CHF exacerbation and ESRD on dialysis, acute GI bleed with acute blood loss anemia and chronic anemia associated with renal failure, and community acquired pneumonia with unidentified organism.     Follow Up and recommended labs and tests   Follow up with Nursing home physician as needed for end of life care.     DNR/DNI     Advance Diet as Tolerated   Follow this diet upon discharge: Orders Placed This Encounter     Regular Diet Adult         Discharge Medications   Current Discharge Medication List      START taking these medications    Details   acetaminophen (TYLENOL) 650 MG Suppository Place 1 suppository (650 mg) rectally every 6 hours as needed for mild pain or fever (temperature over 100  F )  Qty: 60 suppository, Refills: 0    Associated Diagnoses: Hospice care patient      atropine 1 % ophthalmic solution Place 1-2 drops under the tongue every hour as needed for other (secretions)  Qty: 1 Bottle, Refills: 0    Comments: Future refills by hospice doctor or PCP Dr. Shaila Silverman with phone number  [phone].  Associated Diagnoses: Hospice care patient      bisacodyl (DULCOLAX) 10 MG Suppository Place 1 suppository (10 mg) rectally once as needed for other (for no bowel movement for 72 hours)  Qty: 12 suppository, Refills: 0    Comments: Future refills by hospice doctor or PCP Dr. Shaila Silverman with phone number [phone].  Associated Diagnoses: Hospice care patient      haloperidol (HALDOL) 2 MG/ML (HIGH CONC) solution Take 0.5 mLs (1 mg) by mouth every 6 hours as needed for agitation (delirium, hallucinations, restlessness)  Qty: 60 mL, Refills: 0    Comments: Future refills by hospice physician or PCP Dr. Shaila Silverman with phone number [phone].  Associated Diagnoses: Hospice care patient      HYDROmorphone (DILAUDID) 10 mg/mL (HIGH CONC) oral solution Place 0.4 mLs (4 mg) under the tongue every 2 hours as needed for moderate to severe pain (SOB, dyspnea, distress)  Qty: 30 mL, Refills: 0    Comments: Future refills by hospice physician or PCP Dr. Shaila Silverman with phone number [phone].  Associated Diagnoses: Hospice care patient      LORazepam (LORAZEPAM INTENSOL) 2 MG/ML (HIGH CONC) solution Take 1 mL (2 mg) by mouth every 4 hours as needed for anxiety  Qty: 15 mL, Refills: 0    Comments: Future refills by Hospice physician or PCP Dr. Shaila Silverman with phone number [phone].  Associated Diagnoses: Hospice care patient         STOP taking these medications       allopurinol (ZYLOPRIM) 100 MG tablet Comments:   Reason for Stopping:         amLODIPine (NORVASC) 10 MG tablet Comments:   Reason for Stopping:         aspirin 81 MG EC tablet Comments:   Reason for Stopping:         docusate sodium (COLACE) 100 MG capsule Comments:   Reason for Stopping:         donepezil (ARICEPT) 5 MG tablet Comments:   Reason for Stopping:         flurbiprofen (OCUFEN) 0.03 % ophthalmic solution Comments:   Reason for Stopping:         furosemide (LASIX) 80 MG tablet Comments:    Reason for Stopping:         hydrALAZINE (APRESOLINE) 100 MG TABS tablet Comments:   Reason for Stopping:         metoprolol (TOPROL-XL) 50 MG 24 hr tablet Comments:   Reason for Stopping:         Multiple Vitamins-Minerals (DIALYVITE 800/ULTRA D) TABS Comments:   Reason for Stopping:         omeprazole (PRILOSEC) 20 MG CR capsule Comments:   Reason for Stopping:         simvastatin (ZOCOR) 40 MG tablet Comments:   Reason for Stopping:             Allergies   Allergies   Allergen Reactions     Ace Inhibitors Cough     Penicillins      Other reaction(s): *Unknown  Pt was given it for rheumatic fever. MD told him to never take it again per pt statement. ? Allergy.     Data   Most Recent 3 CBC's:  Recent Labs   Lab Test  08/11/18   1700  08/11/18   1006  08/11/18   0500  08/11/18   0300  08/10/18   1855  08/10/18   1539   WBC   --    --    --   15.1*  26.1*  16.8*   HGB  8.3*  8.9*  8.6*  8.8*  7.2*  6.1*   MCV   --    --    --   96  103*  104*   PLT   --    --    --   232  380  314      Most Recent 3 BMP's:  Recent Labs   Lab Test  08/11/18   0300  08/10/18   1855  08/10/18   1539   NA  138  138  138   POTASSIUM  4.2  3.7  4.2   CHLORIDE  99  100  100   CO2  28  27  20   BUN  42*  53*  104*   CR  3.70*  3.28*  6.88*   ANIONGAP  11  11  18*   TAMMY  7.6*  8.3*  8.0*   GLC  144*  161*  230*     Most Recent 2 LFT's:  Recent Labs   Lab Test  08/10/18   1539 03/27/18 03/01/18   1005   AST  41  11*  15   ALT  16  10  15   ALKPHOS  58   --   64   BILITOTAL  0.4   --   0.7     Most Recent INR's and Anticoagulation Dosing History:  Anticoagulation Dose History     Recent Dosing and Labs Latest Ref Rng & Units 5/11/2015 11/16/2016    INR 0.86 - 1.14 0.99 1.00        Most Recent 3 Troponin's:  Recent Labs   Lab Test  08/11/18   1700  08/11/18   0900  08/11/18   0300   TROPI  27.921*  32.861*  28.173*     Most Recent Cholesterol Panel:  Recent Labs   Lab Test  03/01/18   1005   CHOL  102   LDL  44   HDL  43   TRIG  75     Most  Recent 6 Bacteria Isolates From Any Culture (See EPIC Reports for Culture Details):  Recent Labs   Lab Test  08/10/18   1959  08/10/18   1958  08/10/18   1633  08/10/18   1605  08/10/18   1553  12/25/14   1825   CULT  Canceled, Test credited  Duplicate request    Canceled, Test credited  Duplicate request    No growth after 4 days  No growth after 4 days  No growth  No growth     Most Recent TSH, T4 and A1c Labs:  Recent Labs   Lab Test  08/10/18   1539 03/27/18   TSH  0.91   --    A1C   --   6.2*       Results for orders placed or performed during the hospital encounter of 08/10/18   XR Chest Port 1 View    Narrative    XR CHEST PORT 1 VW 8/10/2018 3:48 PM    COMPARISON: 10/27/2016    HISTORY: Respiratory distress, endotracheal tube positioning.      Impression    IMPRESSION: Endotracheal tube tip approximately 4 cm above the amina.  Patchy opacities over both lungs, RIGHT worse than LEFT, edema versus  atypical infection. No pneumothorax seen on either side.    REY PRUITT MD   XR Chest Port 1 View    Narrative    CHEST PORTABLE ONE VIEW 8/10/2018 8:07 PM     HISTORY: Check central line.    COMPARISON: Chest x-ray 8/10/2018.      Impression    IMPRESSION:   1. New right neck central line tip at the superior vena cava.  Nasogastric tube is identified. The ET tube tip is difficult to  visualize.  2. Bilateral vascular and interstitial prominence again noted. This  may be mildly improved on the right but is unchanged on the left.    SOBIA RICHARDSON MD   CT Chest Abdomen Pelvis w/o Contrast    Narrative    CT CHEST/ABDOMEN/PELVIS WITHOUT CONTRAST  8/10/2018 10:02 PM    HISTORY: Occult blood positive stool. Patient with congestive heart  failure, ESRD with questionable sepsis and acute respiratory failure.    TECHNIQUE: CT scan obtained of the chest, abdomen, and pelvis without  IV contrast. Radiation dose for this scan was reduced using automated  exposure control, adjustment of the mA and/or kV according to  patient  size, or iterative reconstruction technique.    COMPARISON:  CT abdomen and pelvis 7/23/2016.    FINDINGS:  Chest: The patient is intubated. Nasogastric tube in place. Thoracic  aortic and coronary artery calcifications. Small bilateral pleural  effusions. Bibasilar atelectasis. There are patchy areas of  consolidation suggested at the bilateral lower lobes and posterior  left upper lobe adjacent to the major fissure. Mild emphysematous  changes. Some respiratory motion limits detail assessment of the  lungs. There are a few scattered mildly prominent lymph nodes in the  mediastinum. One of the larger lymph nodes is 2.0 x 1.2 cm distal  right paratracheal location series 2 image 28. There are other  examples.    Abdomen/pelvis: Diffuse vascular calcifications. House catheter  decompresses the bladder. Small gas in the bladder. Prostate is  prominent measuring 4.7 cm. No bowel obstruction. Colonic  diverticulosis. No convincing diverticulitis or other bowel  inflammation. Normal appendix.    Within the limits of unenhanced scanning, the liver, gallbladder,  spleen, and pancreas shows no significance. Nodular thickening is  hypodense involving the left adrenal with internal density measurement  of 1.8 Hounsfield units. This nodule is 1.7 cm series 2 image 63 and  is consistent with an adenoma. It appears stable. Stable minimal  thickening of the right adrenal. Bilateral renal cysts again noted.  One of the largest is at the upper right kidney measuring 8.6 cm,  stable image 59. There are other cyst examples in the kidneys. No  enlarged lymph nodes in the abdomen or pelvis. Bone windows of the  chest, abdomen, and pelvis do not show any convincing destructive bony  lesion.      Impression    IMPRESSION:  1. Bilateral patchy areas of consolidation at the lung bases and  posterior left upper lobe. Correlate with evidence of multifocal  pneumonia. Lung base opacities may also represent atelectasis.  2. Small  bibasilar pleural effusions.  3. A few nonspecific mildly prominent mediastinal lymph nodes.  4. Colonic diverticulosis without diverticulitis. No acute  inflammatory change of the bowel. Please note that this examination  cannot exclude any underlying colonic lesions.  5. Enlarged prostate.    SOBIA RICHARDSON MD

## 2018-08-14 NOTE — PROGRESS NOTES
Hospice: Met with spouse Carmina to sign the hospice consent forms. Plan is for the pt to be discharged today to [facility] today at 1p. Med recommendations in previous note. POLST completed and placed on front of chart for signature. The  Hospice admission nurse will complete the admission today at 3:30p. Thank you. Diann Babb RN, BSN   Hospice Admission nurse  [phone]

## 2018-08-14 NOTE — PROGRESS NOTES
Clinic Care Coordination Contact  Care Coordination Transition Communication    Clinical Data:   Patient was hospitalized at [facility] from 08/10/2018 to 08/14/2018 with diagnosis of:  CHF exacerbation (H)  Anemia in chronic kidney disease, on chronic dialysis (H)  Acute respiratory failure with hypoxia (H)  Acute pulmonary edema (H)  Gastrointestinal hemorrhage, unspecified gastrointestinal hemorrhage type  NSTEMI  Hospice care patient .     Transition to Facility:              Facility Name: [facility]  Hospice enrollment set for 1530 hours today.              Plan:     Patient transitioned from IP Admission to SNF with Hospice services.      Will not open to care coordination services.     ENROLLMENT STATUS:  Not a Candidate    CARE COORDINATOR STATUS: Care team current    Francesca Zavaleta, RN  [facility]  Clinic Care Coordinator - Prior [place] and [place] Locations   Direct:  [phone] (voicemail available)   (Today's Date: 08/14/2018)

## 2018-08-14 NOTE — PLAN OF CARE
Problem: Patient Care Overview  Goal: Plan of Care/Patient Progress Review  Outcome: No Change  Patient is on comfort cares. Has been sleeping throughout the shift. JUNG orientation. Family will notify nurse when patient becomes restless and needs more pain medication and ativan. Family also requests when they would like patient to be boosted in recliner. Moaning at times. Dilaudid x 1 and Robinol x 1 per family request. Regular diet, not eating today. House catheter with oliguria due to ESRD. Internal jugular in right neck removed today, 2 peripheral IVs in right arm, SL. Patient appears to be resting comfortably in recliner throughout the shift. Plan: Referrals sent to various hospice facilities. Discharge to [facility] may be possible tomorrow, 8/14.

## 2018-08-16 LAB
BACTERIA SPEC CULT: NO GROWTH
BACTERIA SPEC CULT: NO GROWTH
Lab: NORMAL
Lab: NORMAL
SPECIMEN SOURCE: NORMAL
SPECIMEN SOURCE: NORMAL
